# Patient Record
Sex: MALE | Race: WHITE | Employment: FULL TIME | URBAN - METROPOLITAN AREA
[De-identification: names, ages, dates, MRNs, and addresses within clinical notes are randomized per-mention and may not be internally consistent; named-entity substitution may affect disease eponyms.]

---

## 2017-02-08 RX ORDER — HYDROCHLOROTHIAZIDE 25 MG/1
TABLET ORAL
Qty: 30 TAB | Refills: 6 | Status: SHIPPED | OUTPATIENT
Start: 2017-02-08 | End: 2017-08-28 | Stop reason: SDUPTHER

## 2017-03-21 RX ORDER — RAMIPRIL 10 MG/1
CAPSULE ORAL
Qty: 30 CAP | Refills: 3 | Status: SHIPPED | OUTPATIENT
Start: 2017-03-21 | End: 2017-07-21 | Stop reason: SDUPTHER

## 2017-04-07 RX ORDER — METOPROLOL TARTRATE 25 MG/1
TABLET, FILM COATED ORAL
Qty: 60 TAB | Refills: 6 | Status: SHIPPED | OUTPATIENT
Start: 2017-04-07 | End: 2017-04-10 | Stop reason: SDUPTHER

## 2017-04-10 RX ORDER — METOPROLOL TARTRATE 25 MG/1
TABLET, FILM COATED ORAL
Qty: 60 TAB | Refills: 6 | Status: SHIPPED | OUTPATIENT
Start: 2017-04-10 | End: 2018-04-23 | Stop reason: SDUPTHER

## 2017-04-17 ENCOUNTER — OFFICE VISIT (OUTPATIENT)
Dept: CARDIOLOGY CLINIC | Age: 52
End: 2017-04-17

## 2017-04-17 VITALS
DIASTOLIC BLOOD PRESSURE: 88 MMHG | HEART RATE: 91 BPM | HEIGHT: 71 IN | OXYGEN SATURATION: 96 % | SYSTOLIC BLOOD PRESSURE: 138 MMHG | BODY MASS INDEX: 32.62 KG/M2 | WEIGHT: 233 LBS

## 2017-04-17 DIAGNOSIS — I25.10 CORONARY ARTERY DISEASE INVOLVING NATIVE HEART WITHOUT ANGINA PECTORIS, UNSPECIFIED VESSEL OR LESION TYPE: Primary | ICD-10-CM

## 2017-04-17 DIAGNOSIS — I25.5 ISCHEMIC CARDIOMYOPATHY: ICD-10-CM

## 2017-04-17 DIAGNOSIS — E78.00 HYPERCHOLESTEROLEMIA: ICD-10-CM

## 2017-04-17 DIAGNOSIS — Z95.1 S/P CABG X 3: ICD-10-CM

## 2017-04-17 DIAGNOSIS — Z72.0 TOBACCO ABUSE: ICD-10-CM

## 2017-04-17 DIAGNOSIS — I10 ESSENTIAL HYPERTENSION: ICD-10-CM

## 2017-04-17 NOTE — PROGRESS NOTES
1. Have you been to the ER, urgent care clinic since your last visit? Hospitalized since your last visit? No     2. Have you seen or consulted any other health care providers outside of the [address] [place] [address] since your last visit? Include any pap smears or colon screening.  No

## 2017-04-17 NOTE — MR AVS SNAPSHOT
Visit Information Date & Time Provider Department Dept. Phone Encounter #  
 4/17/2017  3:20 PM Chato Gonzalez MD [facility] [address] [id number] Upcoming Health Maintenance Date Due Hepatitis C Screening 1965 Pneumococcal 19-64 Medium Risk (1 of 1 - PPSV23) 6/21/1982 DTaP/Tdap/Td series (1 - Tdap) 6/21/1984 INFLUENZA AGE 9 TO ADULT 8/1/2016 FOBT Q 1 YEAR AGE 50-75 6/3/2017 Allergies as of 4/17/2017  Review Complete On: 10/7/2016 By: Kulwinder Guerra NP No Known Allergies Current Immunizations  Reviewed on 5/31/2016 No immunizations on file. Not reviewed this visit You Were Diagnosed With   
  
 Codes Comments Coronary artery disease involving native heart without angina pectoris, unspecified vessel or lesion type    -  Primary ICD-10-CM: I25.10 ICD-9-CM: 414.01 Essential hypertension     ICD-10-CM: I10 
ICD-9-CM: 401.9 Vitals BP Pulse Height(growth percentile) Weight(growth percentile) SpO2 BMI  
 138/88 91 5' 11\" (1.803 m) 233 lb (105.7 kg) 96% 32.5 kg/m2 Smoking Status Current Every Day Smoker Vitals History BMI and BSA Data Body Mass Index Body Surface Area 32.5 kg/m 2 2.3 m 2 Preferred Pharmacy Pharmacy Name Phone 800 [address] [phone] Your Updated Medication List  
  
   
This list is accurate as of: 4/17/17  4:16 PM.  Always use your most recent med list.  
  
  
  
  
 citalopram 20 mg tablet Commonly known as:  Lindsey Costa Take 0.5 Tabs by mouth daily. hydroCHLOROthiazide 25 mg tablet Commonly known as:  HYDRODIURIL  
take 1 tablet by mouth once daily  
  
 metoprolol tartrate 25 mg tablet Commonly known as:  LOPRESSOR  
take 1 tablet by mouth twice a day  
  
 ramipril 10 mg capsule Commonly known as:  ALTACE  
take 1 capsule by mouth once daily rivaroxaban 20 mg Tab tablet Commonly known as:  Justice Band Take 1 Tab by mouth daily (with dinner). topiramate 25 mg tablet Commonly known as:  TOPAMAX Take 50 mg by mouth nightly. traZODone 150 mg tablet Commonly known as:  Steve Ramp Take 150 mg by mouth nightly. ZOCOR PO Take 40 mg by mouth daily (after dinner). We Performed the Following AMB POC EKG ROUTINE W/ 12 LEADS, INTER & REP [51788 CPT(R)] Introducing [facility]! [facility] introduces [facility] patient portal. Now you can access parts of your medical record, email your doctor's office, and request medication refills online. 1. In your internet browser, go to [email or web address] 2. Click on the First Time User? Click Here link in the Sign In box. You will see the New Member Sign Up page. 3. Enter your [facility] Access Code exactly as it appears below. You will not need to use this code after youve completed the sign-up process. If you do not sign up before the expiration date, you must request a new code. · [facility] Access Code: [email or web address] Expires: 7/16/2017  4:16 PM 
 
4. Enter the last four digits of your Social Security Number (xxxx) and Date of Birth (mm/dd/yyyy) as indicated and click Submit. You will be taken to the next sign-up page. 5. Create a [facility] ID. This will be your [facility] login ID and cannot be changed, so think of one that is secure and easy to remember. 6. Create a [facility] password. You can change your password at any time. 7. Enter your Password Reset Question and Answer. This can be used at a later time if you forget your password. 8. Enter your e-mail address. You will receive e-mail notification when new information is available in [address] 9. Click Sign Up. You can now view and download portions of your medical record. 10. Click the Download Summary menu link to download a portable copy of your medical information. If you have questions, please visit the Frequently Asked Questions section of the [facility] website. Remember, [employer] is NOT to be used for urgent needs. For medical emergencies, dial 911. Now available from your iPhone and Android! Please provide this summary of care documentation to your next provider. Your primary care clinician is listed as LELO RANDOLPH. If you have any questions after today's visit, please call [phone].

## 2017-04-17 NOTE — PROGRESS NOTES
HISTORY OF PRESENT ILLNESS  Augie Parker is a 46 y.o. male. Hypertension   Associated symptoms include shortness of breath. Pertinent negatives include no chest pain, no abdominal pain and no headaches. Patient presents for a new office visit. He has a past medical history significant for known coronary artery disease with a prior myocardial infarction in the past to his lateral wall with stenting of his obtuse marginal branch. He also had a three-vessel bypass CABG in 2009. His long-standing hypertension, dyslipidemia, COPD with active tobacco use. Lastly, he is a history of a GIST tumor which was resected 5 years ago. The patient was hospitalized in June 2016 at [facility] for an acute bilateral pulmonary embolus. He is found to be quite hypoxic at that time. He was started on anticoagulation. He underwent an echocardiogram during his hospital stay which showed a mildly depressed LV systolic function, EF 19-99% with a mildly dilated right ventricle with mildly reduced systolic function, but no obvious pulmonary hypertension. The patient remained on Xarelto for anticoagulation since that time. Patient underwent a pharmacologic nuclear stress test in August 2016 which showed a mild to moderately depressed LV function, EF 40% with a mostly fixed but partially reversible distal posterior lateral perfusion defect likely representing an area of prior infarct with yomi-infarct ischemia. This was not significantly changed compared to his known coronary anatomy, so he has been managed medically. The patient was last seen in the office approximately 8-9 months ago, since that time, he denies any new chest pain or worsening shortness of breath. No orthopnea, PND, or leg swelling. No palpitations, dizziness or syncope. He admits that he is not very active. He is still smoking at least a pack of cigarettes every day. He remains on his oral anticoagulation.   He continues to have chronic shortness of breath which has not changed. Past Medical History:   Diagnosis Date    CAD (coronary artery disease)     Carpal tunnel syndrome     COPD     Depression     H/O echocardiogram June 2016    EF 35-40%, mild RVE, normal PA pressures    Hemorrhoids     History of esophagitis     History of malignant gastrointestinal stromal tumor (GIST)     Hypercholesterolemia     Hypertension     [facility]     Pulmonary embolus ([address] June 2016    Bilateral    S/P CABG x 3 2009    LIMA to LAD, SVG to RPDA, SVG to diagonal    S/P cardiac catheterization November 2011    Patent GALLAGHER to LAD, patent SVG to diet, and occluded SVG to RPDA,  native LAD 85% proximal stenosis, left circumflex 20% diffuse disease, RCA distal 70% stenosis,, EF 45%      Current Outpatient Prescriptions   Medication Sig Dispense Refill    metoprolol tartrate (LOPRESSOR) 25 mg tablet take 1 tablet by mouth twice a day 60 Tab 6    ramipril (ALTACE) 10 mg capsule take 1 capsule by mouth once daily 30 Cap 3    hydroCHLOROthiazide (HYDRODIURIL) 25 mg tablet take 1 tablet by mouth once daily 30 Tab 6    rivaroxaban (XARELTO) 20 mg tab tablet Take 1 Tab by mouth daily (with dinner). 30 Tab 4    citalopram (CELEXA) 20 mg tablet Take 0.5 Tabs by mouth daily. 15 Tab 2    topiramate (TOPAMAX) 25 mg tablet Take 50 mg by mouth nightly.  traZODone (DESYREL) 150 mg tablet Take 150 mg by mouth nightly.  SIMVASTATIN (ZOCOR PO) Take 40 mg by mouth daily (after dinner). No Known Allergies     Social History   Substance Use Topics    Smoking status: Current Every Day Smoker     Packs/day: 2.00    Smokeless tobacco: None    Alcohol use Yes      Comment: \"I haven't drank in 2 months, but I was drinking about 12 pk beer per week\"        History reviewed. No pertinent family history. Review of Systems   Constitutional: Negative for chills, fever and weight loss. HENT: Negative for nosebleeds.     Eyes: Negative for blurred vision and double vision. Respiratory: Positive for shortness of breath. Negative for cough and wheezing. Cardiovascular: Negative for chest pain, palpitations, orthopnea, claudication, leg swelling and PND. Gastrointestinal: Negative for abdominal pain, heartburn, nausea and vomiting. Genitourinary: Negative for dysuria and hematuria. Musculoskeletal: Negative for falls and myalgias. Skin: Negative for rash. Neurological: Negative for dizziness, focal weakness and headaches. Endo/Heme/Allergies: Does not bruise/bleed easily. Psychiatric/Behavioral: Negative for substance abuse. Visit Vitals    /88    Pulse 91    Ht 5' 11\" (1.803 m)    Wt 105.7 kg (233 lb)    SpO2 96%    BMI 32.5 kg/m2      Physical Exam   Constitutional: He is oriented to person, place, and time. He appears well-developed and well-nourished. HENT:   Head: Normocephalic and atraumatic. Eyes: Conjunctivae are normal.   Neck: Neck supple. No JVD present. Carotid bruit is not present. Cardiovascular: Normal rate, regular rhythm, S1 normal, S2 normal and normal pulses. Exam reveals no gallop and no S3. No murmur heard. Pulmonary/Chest: He has decreased breath sounds. He has no wheezes. He has no rales. Abdominal: Soft. Bowel sounds are normal. There is no tenderness. Musculoskeletal: He exhibits no edema or tenderness. Neurological: He is alert and oriented to person, place, and time. Skin: Skin is warm and dry. Psychiatric: He has a normal mood and affect. His behavior is normal.     EKG: Normal sinus rhythm,  Normal axis, inferior Q waves, consistent with prior infarct, normal QTc interval, no ST segment changes concerning for ischemia. No change compared to his previous EKGs. ASSESSMENT and PLAN    Coronary artery disease. Status post three-vessel CABG in 2009. LIMA to LAD, SVG to diagonal SVG to RPDA.   The SVG to PDA is known to be occluded on repeat cardiac catheterization in 2011. His native RCA has a 70% distal stenosis which has been managed medically. Patient underwent a pharmacologic nuclear stress test in August 2016, which showed an ejection fraction of 40% and a partial reversible distal posterior lateral defect consistent with his known coronary anatomy. No new symptoms concerning for angina. I have recommended that he restart his aspirin as soon as he drops his oral anticoagulation. Ischemic cardiomyopathy. EF 35-40% on his most recent echocardiogram in June 2016. This was confirmed on the stress testing several months later his ejection fraction was 40%. He remains on appropriate medical therapy including a beta-blocker, ACE inhibitor, and statin. He appears euvolemic. No need for additional medications at this time. Hypertension. Patient blood pressure is reasonably well-controlled on his current medical regimen, which I would continue. He did notice that when he did not take his HCTZ for several days his blood pressure became elevated and his swelling worsened. I have encouraged him to remain compliant with all his medications. Dyslipidemia. Managed with simvastatin 20 mg daily. His LDL should be less than 70 if possible. Tobacco use disorder. Patient is currently smoking a pack of cigarettes a day which is down from his chronic 2 packs of cigarettes a day. He was encouraged to try to put smoking completely. Recent bilateral pulmonary embolus. Patient remains on Xarelto 20 mg daily. This is being managed by his hematologist/oncologist.  He is now been on anticoagulation for approximately 9 months. Followup in 6 months, sooner if needed.

## 2017-07-23 RX ORDER — RAMIPRIL 10 MG/1
CAPSULE ORAL
Qty: 90 CAP | Refills: 3 | Status: SHIPPED | OUTPATIENT
Start: 2017-07-23 | End: 2018-07-19 | Stop reason: SDUPTHER

## 2017-08-29 RX ORDER — HYDROCHLOROTHIAZIDE 25 MG/1
TABLET ORAL
Qty: 30 TAB | Refills: 11 | Status: SHIPPED | OUTPATIENT
Start: 2017-08-29 | End: 2018-08-06 | Stop reason: SDUPTHER

## 2017-10-02 RX ORDER — RIVAROXABAN 20 MG/1
TABLET, FILM COATED ORAL
Qty: 30 TAB | Refills: 4 | Status: SHIPPED | OUTPATIENT
Start: 2017-10-02 | End: 2017-11-07 | Stop reason: SDUPTHER

## 2017-11-07 ENCOUNTER — OFFICE VISIT (OUTPATIENT)
Dept: CARDIOLOGY CLINIC | Age: 52
End: 2017-11-07

## 2017-11-07 VITALS
SYSTOLIC BLOOD PRESSURE: 110 MMHG | HEIGHT: 71 IN | BODY MASS INDEX: 34.02 KG/M2 | HEART RATE: 84 BPM | OXYGEN SATURATION: 96 % | DIASTOLIC BLOOD PRESSURE: 78 MMHG | WEIGHT: 243 LBS

## 2017-11-07 DIAGNOSIS — I10 ESSENTIAL HYPERTENSION WITH GOAL BLOOD PRESSURE LESS THAN 140/90: ICD-10-CM

## 2017-11-07 DIAGNOSIS — Z95.1 S/P CABG X 3: ICD-10-CM

## 2017-11-07 DIAGNOSIS — I25.10 CORONARY ARTERY DISEASE INVOLVING NATIVE CORONARY ARTERY OF NATIVE HEART WITHOUT ANGINA PECTORIS: Primary | ICD-10-CM

## 2017-11-07 DIAGNOSIS — Z72.0 TOBACCO ABUSE: ICD-10-CM

## 2017-11-07 DIAGNOSIS — R06.02 SOB (SHORTNESS OF BREATH): ICD-10-CM

## 2017-11-07 DIAGNOSIS — I25.5 ISCHEMIC CARDIOMYOPATHY: ICD-10-CM

## 2017-11-07 DIAGNOSIS — E78.5 DYSLIPIDEMIA: ICD-10-CM

## 2017-11-07 RX ORDER — ATORVASTATIN CALCIUM 40 MG/1
40 TABLET, FILM COATED ORAL DAILY
Qty: 30 TAB | Refills: 6 | Status: SHIPPED | OUTPATIENT
Start: 2017-11-07 | End: 2019-07-16

## 2017-11-07 NOTE — PATIENT INSTRUCTIONS
Stop Zocor (simvastatin)    Start Lipitor (atoravastatin) 40 mg take 1 tablet by mouth daily (Prescription sent to [facility])    Have fasting labs done to recheck cholesterol in 3 months (Feb. 2018) lab order will be mailed to you    If you do not receive your testing results within 2 weeks of the test date, please call our office at [phone] (Option 4). Thank you.   Sveta Duenas MD

## 2017-11-07 NOTE — PROGRESS NOTES
1. Have you been to the ER, urgent care clinic since your last visit? Hospitalized since your last visit? no  2. Have you seen or consulted any other health care providers outside of the [address] [place] [address] since your last visit? Include any pap smears or colon screening.   no

## 2017-11-07 NOTE — PROGRESS NOTES
HISTORY OF PRESENT ILLNESS  Brandie Yates is a 45 y.o. male. Hypertension   Associated symptoms include shortness of breath. Pertinent negatives include no chest pain, no abdominal pain and no headaches. Patient presents for a follow-up office visit. He has a past medical history significant for known coronary artery disease with a prior myocardial infarction in the past to his lateral wall with stenting of his obtuse marginal branch. He also had a three-vessel bypass CABG in 2009. His long-standing hypertension, dyslipidemia, COPD with active tobacco use. Lastly, he is a history of a GIST tumor which was resected 5 years ago. The patient was hospitalized in June 2016 at [facility] for an acute bilateral pulmonary embolus. He is found to be quite hypoxic at that time. He was started on anticoagulation. He underwent an echocardiogram during his hospital stay which showed a mildly depressed LV systolic function, EF 31-92% with a mildly dilated right ventricle with mildly reduced systolic function, but no obvious pulmonary hypertension. The patient remained on Xarelto for anticoagulation since that time. Patient underwent a pharmacologic nuclear stress test in August 2016 which showed a mild to moderately depressed LV function, EF 40% with a mostly fixed but partially reversible distal posterior lateral perfusion defect likely representing an area of prior infarct with yomi-infarct ischemia. This was not significantly changed compared to his known coronary anatomy, so he has been managed medically. The patient was last seen in the office approximately 6-7 months ago, since that time, he denies any new chest pain. However, he does feel that his activity tolerance is diminished over the past 6 months. He does complain o dyspnea on exertion which is more of an acute on chronic issue.   He was diagnosed with an episode of acute bronchitis several weeks ago and required a dose of steroids. He states his shortness of breath has improved but has not yet returned to baseline. He denies any leg swelling, orthopnea, PND or claudication. Past Medical History:   Diagnosis Date    CAD (coronary artery disease)     Carpal tunnel syndrome     COPD     Depression     H/O echocardiogram June 2016    EF 35-40%, mild RVE, normal PA pressures    Hemorrhoids     History of esophagitis     History of malignant gastrointestinal stromal tumor (GIST)     Hypercholesterolemia     Hypertension     Myocardial infarction     Pulmonary embolus ([address] June 2016    Bilateral    S/P CABG x 3 2009    LIMA to LAD, SVG to RPDA, SVG to diagonal    S/P cardiac catheterization November 2011    Patent GALLAGHER to LAD, patent SVG to diet, and occluded SVG to RPDA,  native LAD 85% proximal stenosis, left circumflex 20% diffuse disease, RCA distal 70% stenosis,, EF 45%      Current Outpatient Prescriptions   Medication Sig Dispense Refill    atorvastatin (LIPITOR) 40 mg tablet Take 1 Tab by mouth daily. 30 Tab 6    hydroCHLOROthiazide (HYDRODIURIL) 25 mg tablet take 1 tablet by mouth once daily 30 Tab 11    ramipril (ALTACE) 10 mg capsule take 1 capsule by mouth once daily 90 Cap 3    metoprolol tartrate (LOPRESSOR) 25 mg tablet take 1 tablet by mouth twice a day 60 Tab 6    rivaroxaban (XARELTO) 20 mg tab tablet Take 1 Tab by mouth daily (with dinner). 30 Tab 4    citalopram (CELEXA) 20 mg tablet Take 0.5 Tabs by mouth daily. 15 Tab 2    topiramate (TOPAMAX) 25 mg tablet Take 50 mg by mouth nightly.  traZODone (DESYREL) 150 mg tablet Take 150 mg by mouth nightly. No Known Allergies     Social History   Substance Use Topics    Smoking status: Current Every Day Smoker     Packs/day: 2.00    Smokeless tobacco: Never Used    Alcohol use Yes      Comment: \"I haven't drank in 2 months, but I was drinking about 12 pk beer per week\"        History reviewed. No pertinent family history.      Review of Systems   Constitutional: Negative for chills, fever and weight loss. HENT: Negative for nosebleeds. Eyes: Negative for blurred vision and double vision. Respiratory: Positive for shortness of breath. Negative for cough and wheezing. Cardiovascular: Negative for chest pain, palpitations, orthopnea, claudication, leg swelling and PND. Gastrointestinal: Negative for abdominal pain, heartburn, nausea and vomiting. Genitourinary: Negative for dysuria and hematuria. Musculoskeletal: Negative for falls and myalgias. Skin: Negative for rash. Neurological: Negative for dizziness, focal weakness and headaches. Endo/Heme/Allergies: Does not bruise/bleed easily. Psychiatric/Behavioral: Negative for substance abuse. Visit Vitals    /78 (BP 1 Location: Right arm, BP Patient Position: Sitting)    Pulse 84    Ht 5' 11\" (1.803 m)    Wt 110.2 kg (243 lb)    SpO2 96%    BMI 33.89 kg/m2      Physical Exam   Constitutional: He is oriented to person, place, and time. He appears well-developed and well-nourished. HENT:   Head: Normocephalic and atraumatic. Eyes: Conjunctivae are normal.   Neck: Neck supple. No JVD present. Carotid bruit is not present. Cardiovascular: Normal rate, regular rhythm, S1 normal, S2 normal and normal pulses. Exam reveals no gallop and no S3. No murmur heard. Pulmonary/Chest: He has decreased breath sounds. He has no wheezes. He has no rales. Abdominal: Soft. Bowel sounds are normal. There is no tenderness. Musculoskeletal: He exhibits no edema or tenderness. Neurological: He is alert and oriented to person, place, and time. Skin: Skin is warm and dry. Psychiatric: He has a normal mood and affect. His behavior is normal.     EKG: Normal sinus rhythm,  Normal axis, inferior Q waves, consistent with prior infarct, normal QTc interval, no ST segment changes concerning for ischemia. No change compared to his previous EKGs.     ASSESSMENT and PLAN    Coronary artery disease. Status post three-vessel CABG in 2009. LIMA to LAD, SVG to diagonal SVG to RPDA. The SVG to PDA is known to be occluded on repeat cardiac catheterization in 2011. His native RCA has a 70% distal stenosis which has been managed medically. Patient underwent a pharmacologic nuclear stress test in August 2016, which showed an ejection fraction of 40% and a partial reversible distal posterior lateral defect consistent with his known coronary anatomy. No new symptoms concerning for angina. He is no longer taking an aspirin because of his oral anticoagulation. However I would recommend that he restart this once his anticoagulation is stopped. Ischemic cardiomyopathy. EF 35-40% on his most recent echocardiogram in June 2016. This was confirmed on the stress testing several months later his ejection fraction was 40%. He remains on appropriate medical therapy including a beta-blocker, ACE inhibitor, and statin. He appears euvolemic. Since he does complain of decreased activity tolerance and some shortness of breath, I have recommended repeating an echocardiogram to reevaluate his overall LV function. Hypertension. Patient blood pressure is reasonably well-controlled on his current medical regimen. All of which I would continue. Dyslipidemia. Patient is no longer taking any lipid-lowering therapy for unknown reasons. I recommended that he start atorvastatin 40 mg daily. I would like to check a fasting lipid panel and LFTs in several months. Tobacco use disorder. Patient is currently smoking a pack of cigarettes a day which is down from his chronic 2 packs of cigarettes a day. He was encouraged to try to put smoking completely. History of bilateral pulmonary embolus. This occurred in  June 2016. Patient remains on Xarelto 20 mg daily. This is being managed by his hematologist/oncologist.      Followup in 6 months, sooner if needed.

## 2017-11-07 NOTE — MR AVS SNAPSHOT
Visit Information Date & Time Provider Department Dept. Phone Encounter #  
 11/7/2017  2:00 PM Checo Gonzales MD [facility] [id number] Follow-up Instructions Return in about 6 months (around 5/7/2018). Follow-up and Disposition History Upcoming Health Maintenance Date Due Hepatitis C Screening 1965 Pneumococcal 19-64 Medium Risk (1 of 1 - PPSV23) 6/21/1982 DTaP/Tdap/Td series (1 - Tdap) 6/21/1984 FOBT Q 1 YEAR AGE 50-75 6/3/2017 Influenza Age 5 to Adult 8/1/2017 Allergies as of 11/7/2017  Review Complete On: 11/7/2017 By: Checo Gonzales MD  
 No Known Allergies Current Immunizations  Reviewed on 5/31/2016 No immunizations on file. Not reviewed this visit You Were Diagnosed With   
  
 Codes Comments Coronary artery disease involving native coronary artery of native heart without angina pectoris    -  Primary ICD-10-CM: I25.10 ICD-9-CM: 414.01   
 SOB (shortness of breath)     ICD-10-CM: R06.02 
ICD-9-CM: 786.05 Dyslipidemia     ICD-10-CM: E78.5 ICD-9-CM: 272.4 S/P CABG x 3     ICD-10-CM: Z95.1 ICD-9-CM: V45.81 Tobacco abuse     ICD-10-CM: Z72.0 ICD-9-CM: 305.1 Essential hypertension with goal blood pressure less than 140/90     ICD-10-CM: I10 
ICD-9-CM: 401.9 Ischemic cardiomyopathy     ICD-10-CM: I25.5 ICD-9-CM: 414.8 Vitals BP Pulse Height(growth percentile) Weight(growth percentile) SpO2 BMI  
 110/78 (BP 1 Location: Right arm, BP Patient Position: Sitting) 84 5' 11\" (1.803 m) 243 lb (110.2 kg) 96% 33.89 kg/m2 Smoking Status Current Every Day Smoker Vitals History BMI and BSA Data Body Mass Index Body Surface Area  
 33.89 kg/m 2 2.35 m 2 Preferred Pharmacy Pharmacy Name Phone [address], [address] [place] [address] [phone] Your Updated Medication List  
  
   
 This list is accurate as of: 11/7/17  2:40 PM.  Always use your most recent med list.  
  
  
  
  
 atorvastatin 40 mg tablet Commonly known as:  LIPITOR Take 1 Tab by mouth daily. citalopram 20 mg tablet Commonly known as:  Linn Lindau Take 0.5 Tabs by mouth daily. hydroCHLOROthiazide 25 mg tablet Commonly known as:  HYDRODIURIL  
take 1 tablet by mouth once daily  
  
 metoprolol tartrate 25 mg tablet Commonly known as:  LOPRESSOR  
take 1 tablet by mouth twice a day  
  
 ramipril 10 mg capsule Commonly known as:  ALTACE  
take 1 capsule by mouth once daily  
  
 rivaroxaban 20 mg Tab tablet Commonly known as:  Rasheeda Jah Take 1 Tab by mouth daily (with dinner). topiramate 25 mg tablet Commonly known as:  TOPAMAX Take 50 mg by mouth nightly. traZODone 150 mg tablet Commonly known as:  Rogers Bump Take 150 mg by mouth nightly. Prescriptions Sent to Pharmacy Refills  
 atorvastatin (LIPITOR) 40 mg tablet 6 Sig: Take 1 Tab by mouth daily. Class: Normal  
 Pharmacy: [facility], [address] [place] [address] #: [phone] Route: Oral  
  
We Performed the Following AMB POC EKG ROUTINE W/ 12 LEADS, INTER & REP [15145 CPT(R)] Follow-up Instructions Return in about 6 months (around 5/7/2018). To-Do List   
 11/07/2017 ECHO:  2D ECHO COMPLETE ADULT (TTE) W OR WO CONTR   
  
 11/28/2017 11:00 AM  
  Appointment with HBV- [id number] MACHINE (WT ) at [facility] NON-INVASIVE CARD ([phone]) Age Limit for ALL Heart procedures @ all [facility] facilities: 18 yrs and older only. Under the age of 25, refer to [facility] ([phone]). Wt Limit: 350lbs. This study requires patient to bring a written physician's order or MD office may fax the order to Central Scheduling at [phone]. Patient needs to bring a current list of all medications.   No preparation is required for this study. Patients should report 15 minutes prior to their appointment time to the [facility], [address].     
  
 02/07/2018 Lab:  HEPATIC FUNCTION PANEL   
  
 02/07/2018 Lab:  LIPID PANEL Patient Instructions Stop Zocor (simvastatin) Start Lipitor (atoravastatin) 40 mg take 1 tablet by mouth daily (Prescription sent to [facility]) Have fasting labs done to recheck cholesterol in 3 months (Feb. 2018) lab order will be mailed to you If you do not receive your testing results within 2 weeks of the test date, please call our office at [phone] (Option 4). Thank you. Rosa Hyde MD 
  
 
 
 
 Patient Instructions History Introducing hospitals & HEALTH SERVICES! Taran Alcantara introduces [employer] patient portal. Now you can access parts of your medical record, email your doctor's office, and request medication refills online. 1. In your internet browser, go to [email or web address] 2. Click on the First Time User? Click Here link in the Sign In box. You will see the New Member Sign Up page. 3. Enter your [employer] Access Code exactly as it appears below. You will not need to use this code after youve completed the sign-up process. If you do not sign up before the expiration date, you must request a new code. · [employer] Access Code: [email or web address] Expires: 2/5/2018  1:50 PM 
 
4. Enter the last four digits of your Social Security Number (xxxx) and Date of Birth (mm/dd/yyyy) as indicated and click Submit. You will be taken to the next sign-up page. 5. Create a [employer] ID. This will be your [employer] login ID and cannot be changed, so think of one that is secure and easy to remember. 6. Create a [employer] password. You can change your password at any time. 7. Enter your Password Reset Question and Answer. This can be used at a later time if you forget your password. 8. Enter your e-mail address. You will receive e-mail notification when new information is available in [address] 9. Click Sign Up. You can now view and download portions of your medical record. 10. Click the Download Summary menu link to download a portable copy of your medical information. If you have questions, please visit the Frequently Asked Questions section of the "[employer]" website. Remember, "[employer]" is NOT to be used for urgent needs. For medical emergencies, dial 911. Now available from your iPhone and Android! Please provide this summary of care documentation to your next provider. Your primary care clinician is listed as Martín Garvin. If you have any questions after today's visit, please call [phone].

## 2017-11-28 ENCOUNTER — HOSPITAL ENCOUNTER (OUTPATIENT)
Dept: NON INVASIVE DIAGNOSTICS | Age: 52
Discharge: HOME | End: 2017-11-28
Attending: INTERNAL MEDICINE
Payer: COMMERCIAL

## 2017-11-28 DIAGNOSIS — R06.02 SOB (SHORTNESS OF BREATH): ICD-10-CM

## 2017-11-28 PROCEDURE — 74011250636 HC RX REV CODE- 250/636: Performed by: INTERNAL MEDICINE

## 2017-11-28 PROCEDURE — C8929 TTE W OR WO FOL WCON,DOPPLER: HCPCS

## 2017-11-28 RX ADMIN — PERFLUTREN 2 ML: 6.52 INJECTION, SUSPENSION INTRAVENOUS at 11:34

## 2017-11-28 NOTE — PROGRESS NOTES
Complete 2D echocardiogram study was performed on patient. Report to follow. Definity was used to acquire echocardiogram pictures. Report to follow. Patient armband was removed before discharging.

## 2017-11-30 ENCOUNTER — TELEPHONE (OUTPATIENT)
Dept: CARDIOLOGY CLINIC | Age: 52
End: 2017-11-30

## 2017-11-30 NOTE — PROGRESS NOTES
Ischemic cardiomyopathy. EF 35-40% on his most recent echocardiogram in June 2016. This was confirmed on the stress testing several months later his ejection fraction was 40%. He remains on appropriate medical therapy including a beta-blocker, ACE inhibitor, and statin. He appears euvolemic. Since he does complain of decreased activity tolerance and some shortness of breath, I have recommended repeating an echocardiogram to reevaluate his overall LV function.

## 2017-11-30 NOTE — TELEPHONE ENCOUNTER
----- Message from Enmanuel Sylvester MD sent at 11/30/2017  3:35 PM EST -----  Please let the patient know that his heart function remains moderately depressed and was essentially unchanged compared to last year.  ----- Message -----     From: Laurina Goldmann     Sent: 11/30/2017   3:29 PM       To: Enmanuel Sylvester MD    Ischemic cardiomyopathy. EF 35-40% on his most recent echocardiogram in June 2016. This was confirmed on the stress testing several months later his ejection fraction was 40%. He remains on appropriate medical therapy including a beta-blocker, ACE inhibitor, and statin. He appears euvolemic. Since he does complain of decreased activity tolerance and some shortness of breath, I have recommended repeating an echocardiogram to reevaluate his overall LV function.

## 2017-11-30 NOTE — LETTER
12/1/2017 8:02 AM 
 
Mr. Young Reece [address] [id number] Dear Quentin Denise Briseno, We have been unable to reach you by phone to notify you of your test results. Please call our office at [phone] and ask to speak with my nurse in order to explain these results to you and advise you of any recommendations. Sincerely, Antonio Luis MD

## 2017-12-30 ENCOUNTER — HOSPITAL ENCOUNTER (EMERGENCY)
Age: 52
Discharge: HOME | End: 2017-12-30
Attending: EMERGENCY MEDICINE
Payer: COMMERCIAL

## 2017-12-30 ENCOUNTER — APPOINTMENT (OUTPATIENT)
Dept: CT IMAGING | Age: 52
End: 2017-12-30
Attending: PHYSICIAN ASSISTANT
Payer: COMMERCIAL

## 2017-12-30 VITALS
BODY MASS INDEX: 32.9 KG/M2 | HEIGHT: 71 IN | TEMPERATURE: 98 F | RESPIRATION RATE: 18 BRPM | SYSTOLIC BLOOD PRESSURE: 110 MMHG | HEART RATE: 73 BPM | WEIGHT: 235 LBS | OXYGEN SATURATION: 99 % | DIASTOLIC BLOOD PRESSURE: 80 MMHG

## 2017-12-30 DIAGNOSIS — G44.029 CHRONIC CLUSTER HEADACHE, NOT INTRACTABLE: ICD-10-CM

## 2017-12-30 DIAGNOSIS — E87.6 HYPOKALEMIA: ICD-10-CM

## 2017-12-30 DIAGNOSIS — R19.7 DIARRHEA, UNSPECIFIED TYPE: ICD-10-CM

## 2017-12-30 DIAGNOSIS — E86.0 DEHYDRATION: ICD-10-CM

## 2017-12-30 DIAGNOSIS — R42 DIZZINESS: Primary | ICD-10-CM

## 2017-12-30 LAB
ALBUMIN SERPL-MCNC: 3.4 G/DL (ref 3.4–5)
ALBUMIN/GLOB SERPL: 0.8 {RATIO} (ref 0.8–1.7)
ALP SERPL-CCNC: 45 U/L (ref 45–117)
ALT SERPL-CCNC: 22 U/L (ref 16–61)
ANION GAP SERPL CALC-SCNC: 8 MMOL/L (ref 3–18)
AST SERPL-CCNC: 16 U/L (ref 15–37)
BASOPHILS # BLD: 0.1 K/UL (ref 0–0.06)
BASOPHILS NFR BLD: 2 % (ref 0–2)
BILIRUB DIRECT SERPL-MCNC: <0.1 MG/DL (ref 0–0.2)
BILIRUB SERPL-MCNC: 0.4 MG/DL (ref 0.2–1)
BUN SERPL-MCNC: 25 MG/DL (ref 7–18)
BUN/CREAT SERPL: 18 (ref 12–20)
CALCIUM SERPL-MCNC: 10.3 MG/DL (ref 8.5–10.1)
CHLORIDE SERPL-SCNC: 93 MMOL/L (ref 100–108)
CK MB CFR SERPL CALC: 1.5 % (ref 0–4)
CK MB SERPL-MCNC: 1.6 NG/ML (ref 5–25)
CK SERPL-CCNC: 106 U/L (ref 39–308)
CO2 SERPL-SCNC: 31 MMOL/L (ref 21–32)
CREAT SERPL-MCNC: 1.38 MG/DL (ref 0.6–1.3)
DIFFERENTIAL METHOD BLD: ABNORMAL
EOSINOPHIL # BLD: 0.5 K/UL (ref 0–0.4)
EOSINOPHIL NFR BLD: 6 % (ref 0–5)
ERYTHROCYTE [DISTWIDTH] IN BLOOD BY AUTOMATED COUNT: 15.6 % (ref 11.6–14.5)
GLOBULIN SER CALC-MCNC: 4.1 G/DL (ref 2–4)
GLUCOSE SERPL-MCNC: 91 MG/DL (ref 74–99)
HCT VFR BLD AUTO: 37.9 % (ref 36–48)
HGB BLD-MCNC: 12.5 G/DL (ref 13–16)
LIPASE SERPL-CCNC: 78 U/L (ref 73–393)
LYMPHOCYTES # BLD: 2 K/UL (ref 0.9–3.6)
LYMPHOCYTES NFR BLD: 27 % (ref 21–52)
MCH RBC QN AUTO: 27.5 PG (ref 24–34)
MCHC RBC AUTO-ENTMCNC: 33 G/DL (ref 31–37)
MCV RBC AUTO: 83.5 FL (ref 74–97)
MONOCYTES # BLD: 0.6 K/UL (ref 0.05–1.2)
MONOCYTES NFR BLD: 8 % (ref 3–10)
NEUTS SEG # BLD: 4.3 K/UL (ref 1.8–8)
NEUTS SEG NFR BLD: 57 % (ref 40–73)
PLATELET # BLD AUTO: 223 K/UL (ref 135–420)
PMV BLD AUTO: 10.4 FL (ref 9.2–11.8)
POTASSIUM SERPL-SCNC: 3 MMOL/L (ref 3.5–5.5)
PROT SERPL-MCNC: 7.5 G/DL (ref 6.4–8.2)
RBC # BLD AUTO: 4.54 M/UL (ref 4.7–5.5)
SODIUM SERPL-SCNC: 132 MMOL/L (ref 136–145)
TROPONIN I SERPL-MCNC: 0.02 NG/ML (ref 0–0.04)
WBC # BLD AUTO: 7.4 K/UL (ref 4.6–13.2)

## 2017-12-30 PROCEDURE — 74011250636 HC RX REV CODE- 250/636: Performed by: EMERGENCY MEDICINE

## 2017-12-30 PROCEDURE — 93005 ELECTROCARDIOGRAM TRACING: CPT

## 2017-12-30 PROCEDURE — 99283 EMERGENCY DEPT VISIT LOW MDM: CPT

## 2017-12-30 PROCEDURE — 83690 ASSAY OF LIPASE: CPT | Performed by: EMERGENCY MEDICINE

## 2017-12-30 PROCEDURE — 74011250636 HC RX REV CODE- 250/636: Performed by: PHYSICIAN ASSISTANT

## 2017-12-30 PROCEDURE — 82550 ASSAY OF CK (CPK): CPT | Performed by: EMERGENCY MEDICINE

## 2017-12-30 PROCEDURE — 80076 HEPATIC FUNCTION PANEL: CPT | Performed by: EMERGENCY MEDICINE

## 2017-12-30 PROCEDURE — 80048 BASIC METABOLIC PNL TOTAL CA: CPT | Performed by: EMERGENCY MEDICINE

## 2017-12-30 PROCEDURE — 96374 THER/PROPH/DIAG INJ IV PUSH: CPT

## 2017-12-30 PROCEDURE — 96361 HYDRATE IV INFUSION ADD-ON: CPT

## 2017-12-30 PROCEDURE — 85025 COMPLETE CBC W/AUTO DIFF WBC: CPT | Performed by: PHYSICIAN ASSISTANT

## 2017-12-30 PROCEDURE — 70450 CT HEAD/BRAIN W/O DYE: CPT

## 2017-12-30 PROCEDURE — 96375 TX/PRO/DX INJ NEW DRUG ADDON: CPT

## 2017-12-30 PROCEDURE — 74011250637 HC RX REV CODE- 250/637: Performed by: PHYSICIAN ASSISTANT

## 2017-12-30 RX ORDER — POTASSIUM CHLORIDE 20 MEQ/1
20 TABLET, EXTENDED RELEASE ORAL 3 TIMES DAILY
Qty: 9 TAB | Refills: 0 | Status: SHIPPED | OUTPATIENT
Start: 2017-12-30 | End: 2018-01-02

## 2017-12-30 RX ORDER — ONDANSETRON 4 MG/1
TABLET, ORALLY DISINTEGRATING ORAL
Qty: 10 TAB | Refills: 0 | Status: SHIPPED | OUTPATIENT
Start: 2017-12-30 | End: 2018-05-09 | Stop reason: SDUPTHER

## 2017-12-30 RX ORDER — DIPHENHYDRAMINE HYDROCHLORIDE 50 MG/ML
50 INJECTION, SOLUTION INTRAMUSCULAR; INTRAVENOUS ONCE
Status: COMPLETED | OUTPATIENT
Start: 2017-12-30 | End: 2017-12-30

## 2017-12-30 RX ORDER — PREDNISONE 10 MG/1
TABLET ORAL
Qty: 21 TAB | Refills: 0 | Status: SHIPPED | OUTPATIENT
Start: 2017-12-30 | End: 2019-06-20

## 2017-12-30 RX ORDER — POTASSIUM CHLORIDE 20 MEQ/1
40 TABLET, EXTENDED RELEASE ORAL
Status: COMPLETED | OUTPATIENT
Start: 2017-12-30 | End: 2017-12-30

## 2017-12-30 RX ORDER — METOCLOPRAMIDE HYDROCHLORIDE 5 MG/ML
10 INJECTION INTRAMUSCULAR; INTRAVENOUS
Status: COMPLETED | OUTPATIENT
Start: 2017-12-30 | End: 2017-12-30

## 2017-12-30 RX ADMIN — DIPHENHYDRAMINE HYDROCHLORIDE 50 MG: 50 INJECTION INTRAMUSCULAR; INTRAVENOUS at 17:22

## 2017-12-30 RX ADMIN — SODIUM CHLORIDE 250 ML: 900 INJECTION, SOLUTION INTRAVENOUS at 19:01

## 2017-12-30 RX ADMIN — POTASSIUM CHLORIDE 40 MEQ: 20 TABLET, EXTENDED RELEASE ORAL at 19:01

## 2017-12-30 RX ADMIN — METOCLOPRAMIDE 10 MG: 5 INJECTION, SOLUTION INTRAMUSCULAR; INTRAVENOUS at 17:52

## 2017-12-30 RX ADMIN — SODIUM CHLORIDE 250 ML: 900 INJECTION, SOLUTION INTRAVENOUS at 17:54

## 2017-12-30 NOTE — ED NOTES
I performed a brief evaluation, including history and physical, of the patient here in triage and I have determined that pt will need further treatment and evaluation from the main side ER physician. I have placed initial orders to help in expediting patients care.      December 30, 2017 at 5:21 PM - RADHA Ferguson        Visit Vitals    /80 (BP 1 Location: Left arm, BP Patient Position: At rest)    Pulse 73    Temp 98 °F (36.7 °C)    Resp 18    Ht 5' 11\" (1.803 m)    Wt 106.6 kg (235 lb)    SpO2 99%    BMI 32.78 kg/m2

## 2017-12-30 NOTE — ED TRIAGE NOTES
Triage: pt seen at [facility] last week and was prescribed Topamax. Pt states that ever since he started taking the medication he has had dizziness, nausea, diarrhea. Pt went to follow-up appt today and was noted to have BP of 90/62. Triage /80. Pt sent to ED for further tx. Pt still complains of right sided headache and neck tension.

## 2017-12-30 NOTE — ED PROVIDER NOTES
EMERGENCY DEPARTMENT HISTORY AND PHYSICAL EXAM    6:27 PM      Date: 12/30/2017  Patient Name: Virginie Bautista    History of Presenting Illness     Chief Complaint   Patient presents with    Headache       History Provided By: Patient    Chief Complaint: headache, dizziness, nausea, diarrhea  Duration: 10 Days  Timing:  Progressive  Location: multiple body systems  Quality: Aching  Severity: Moderate  Modifying Factors: started after Topamax for chronic cluster headaches  Associated Symptoms: denies any other associated signs or symptoms      Additional History (Context): Virginie Bautista is a 45 y.o. male smoker with a pertinent history of COPD, CHF, CAD with MI s/p CABG x 3 and stent x 1, HTN, HLD, and PE (AC with xarelto) who presents to the emergency department for evaluation of intermittent dizziness, nausea without vomiting, and diarrhea x 10 days since starting Topamax was prescribed by urgent care for chronic cluster headaches. He reports the topamax works for his headaches, but is making him sick. He is having approximately 3 episodes of loose stools daily. He relates decreased oral intake 2/2 to nausea. Cluster headaches are accompanied by rhinorrhea and cough - this is normal for him. No change in vision/photophobia. No recent injury/trauma. He had a follow-up appointment at Urgent care today and was noted to have hypotensive BP at 90/66mmHg, so they sent him here for evaluation. Pt denies increased SOB from baseline. No CP. With prior MI, had \"cramping in left armpit,\" but denies anything similar today. Pt was also restarted on statin (Lipitor) over a month ago, but denies any issues with this medication. He reports compliance with all prescribed medications and does not miss any doses. Pt denies any recent fevers or chills, abd pain, leg swelling, back pain, melena/hematochezia, dysuria, hematuria, frequency, focal weakness/numbness/tingling, or rash.   Patient has no other complaints at this time. PCP:  RADHA Carmona      Current Outpatient Prescriptions   Medication Sig Dispense Refill    potassium chloride (K-DUR, KLOR-CON) 20 mEq tablet Take 1 Tab by mouth three (3) times daily for 3 days. 9 Tab 0    ondansetron (ZOFRAN ODT) 4 mg disintegrating tablet Take 1-2 tablets every 6-8 hours as needed for nausea and vomiting. 10 Tab 0    atorvastatin (LIPITOR) 40 mg tablet Take 1 Tab by mouth daily. 30 Tab 6    hydroCHLOROthiazide (HYDRODIURIL) 25 mg tablet take 1 tablet by mouth once daily 30 Tab 11    ramipril (ALTACE) 10 mg capsule take 1 capsule by mouth once daily 90 Cap 3    metoprolol tartrate (LOPRESSOR) 25 mg tablet take 1 tablet by mouth twice a day 60 Tab 6    rivaroxaban (XARELTO) 20 mg tab tablet Take 1 Tab by mouth daily (with dinner). 30 Tab 4    citalopram (CELEXA) 20 mg tablet Take 0.5 Tabs by mouth daily. 15 Tab 2    topiramate (TOPAMAX) 25 mg tablet Take 50 mg by mouth nightly.  traZODone (DESYREL) 150 mg tablet Take 150 mg by mouth nightly.          Past History     Past Medical History:  Past Medical History:   Diagnosis Date    CAD (coronary artery disease)     Carpal tunnel syndrome     COPD     Depression     H/O echocardiogram June 2016    EF 35-40%, mild RVE, normal PA pressures    Hemorrhoids     History of esophagitis     History of malignant gastrointestinal stromal tumor (GIST)     Hypercholesterolemia     Hypertension     Myocardial infarction     Pulmonary embolus ([address] June 2016    Bilateral    S/P CABG x 3 2009    LIMA to LAD, SVG to RPDA, SVG to diagonal    S/P cardiac catheterization November 2011    Patent GALLAGHER to LAD, patent SVG to diet, and occluded SVG to RPDA,  native LAD 85% proximal stenosis, left circumflex 20% diffuse disease, RCA distal 70% stenosis,, EF 45%       Past Surgical History:  Past Surgical History:   Procedure Laterality Date    HX CORONARY STENT PLACEMENT      HX CYST REMOVAL      HX HEMORRHOIDECTOMY      HX ORTHOPAEDIC      knee    HX OTHER SURGICAL      resection of mesenteric mass       Family History:  History reviewed. No pertinent family history. Social History:  Social History   Substance Use Topics    Smoking status: Current Every Day Smoker     Packs/day: 1.00    Smokeless tobacco: Never Used    Alcohol use Yes      Comment: \"I haven't drank in 2 months, but I was drinking about 12 pk beer per week\"        Allergies:  No Known Allergies      Review of Systems     Review of Systems   Constitutional: Positive for appetite change. Negative for chills and fever. HENT: Positive for congestion and rhinorrhea. Negative for sore throat. Respiratory: Positive for cough. Negative for shortness of breath. Cardiovascular: Negative for chest pain and leg swelling. Gastrointestinal: Positive for diarrhea and nausea. Negative for abdominal pain, blood in stool, constipation and vomiting. Genitourinary: Negative for dysuria, frequency and hematuria. Musculoskeletal: Negative for back pain and myalgias. Skin: Negative for rash and wound. Neurological: Positive for dizziness and headaches. Physical Exam     Visit Vitals    /80 (BP 1 Location: Left arm, BP Patient Position: At rest)    Pulse 73    Temp 98 °F (36.7 °C)    Resp 18    Ht 5' 11\" (1.803 m)    Wt 106.6 kg (235 lb)    SpO2 99%    BMI 32.78 kg/m2       Physical Exam   Constitutional: He is oriented to person, place, and time. He appears well-developed and well-nourished. No distress. Smells strongly of cigarettes   HENT:   Head: Normocephalic and atraumatic. Nose: Mucosal edema and rhinorrhea present. Eyes: Conjunctivae and EOM are normal. Pupils are equal, round, and reactive to light. Right eye exhibits no discharge. Left eye exhibits no discharge. Neck: Normal range of motion. Neck supple. No thyromegaly present. Cardiovascular: Normal rate and regular rhythm.     Pulmonary/Chest: Effort normal and breath sounds normal. No respiratory distress. He has no wheezes. He has no rales. He exhibits no tenderness. Lungs CTAB   Abdominal: Soft. Bowel sounds are normal. He exhibits no distension. There is no tenderness. There is no rebound and no guarding. Musculoskeletal: Normal range of motion. He exhibits no edema, tenderness or deformity. Lymphadenopathy:     He has no cervical adenopathy. Neurological: He is alert and oriented to person, place, and time. No cranial nerve deficit. Coordination normal.   Pt is awake, alert, oriented x 3.  CNs 2-12 intact and normal.  No facial droop. Normal speech. Pt is answering questions and following commands appropriately. Pt ambulatory with even, steady gait, moving BUE and BLE with equal strength and intact distal neurovascular status. Skin: Skin is warm and dry. No rash noted. He is not diaphoretic. Psychiatric: He has a normal mood and affect. Nursing note and vitals reviewed.         Diagnostic Study Results     Labs -  Recent Results (from the past 12 hour(s))   METABOLIC PANEL, BASIC    Collection Time: 12/30/17  5:42 PM   Result Value Ref Range    Sodium 132 (L) 136 - 145 mmol/L    Potassium 3.0 (L) 3.5 - 5.5 mmol/L    Chloride 93 (L) 100 - 108 mmol/L    CO2 31 21 - 32 mmol/L    Anion gap 8 3.0 - 18 mmol/L    Glucose 91 74 - 99 mg/dL    BUN 25 (H) 7.0 - 18 MG/DL    Creatinine 1.38 (H) 0.6 - 1.3 MG/DL    BUN/Creatinine ratio 18 12 - 20      GFR est AA >60 >60 ml/min/1.73m2    GFR est non-AA 54 (L) >60 ml/min/1.73m2    Calcium 10.3 (H) 8.5 - 10.1 MG/DL   CARDIAC PANEL,(CK, CKMB & TROPONIN)    Collection Time: 12/30/17  5:42 PM   Result Value Ref Range     39 - 308 U/L    CK - MB 1.6 <3.6 ng/ml    CK-MB Index 1.5 0.0 - 4.0 %    Troponin-I, Qt. 0.02 0.0 - 0.045 NG/ML   CBC WITH AUTOMATED DIFF    Collection Time: 12/30/17  5:42 PM   Result Value Ref Range    WBC 7.4 4.6 - 13.2 K/uL    RBC 4.54 (L) 4.70 - 5.50 M/uL    HGB 12.5 (L) 13.0 - 16.0 g/dL    HCT 37.9 36.0 - 48.0 %    MCV 83.5 74.0 - 97.0 FL    MCH 27.5 24.0 - 34.0 PG    MCHC 33.0 31.0 - 37.0 g/dL    RDW 15.6 (H) 11.6 - 14.5 %    PLATELET 545 457 - 919 K/uL    MPV 10.4 9.2 - 11.8 FL    NEUTROPHILS 57 40 - 73 %    LYMPHOCYTES 27 21 - 52 %    MONOCYTES 8 3 - 10 %    EOSINOPHILS 6 (H) 0 - 5 %    BASOPHILS 2 0 - 2 %    ABS. NEUTROPHILS 4.3 1.8 - 8.0 K/UL    ABS. LYMPHOCYTES 2.0 0.9 - 3.6 K/UL    ABS. MONOCYTES 0.6 0.05 - 1.2 K/UL    ABS. EOSINOPHILS 0.5 (H) 0.0 - 0.4 K/UL    ABS. BASOPHILS 0.1 (H) 0.0 - 0.06 K/UL    DF AUTOMATED     HEPATIC FUNCTION PANEL    Collection Time: 12/30/17  5:42 PM   Result Value Ref Range    Protein, total 7.5 6.4 - 8.2 g/dL    Albumin 3.4 3.4 - 5.0 g/dL    Globulin 4.1 (H) 2.0 - 4.0 g/dL    A-G Ratio 0.8 0.8 - 1.7      Bilirubin, total 0.4 0.2 - 1.0 MG/DL    Bilirubin, direct <0.1 0.0 - 0.2 MG/DL    Alk. phosphatase 45 45 - 117 U/L    AST (SGOT) 16 15 - 37 U/L    ALT (SGPT) 22 16 - 61 U/L   LIPASE    Collection Time: 12/30/17  5:42 PM   Result Value Ref Range    Lipase 78 73 - 393 U/L       Radiologic Studies -   Ct Head Wo Cont    Result Date: 12/30/2017  CT HEAD WO CONT History: Dizziness, nausea after starting new medication. Comparison: None. TECHNIQUE: 5 mm helical scan obtained of the head were obtained from the skull vertex through the base of the skull without intravenous contrast.  All CT scans at this facility are performed using dose optimization technique as appropriate to a performed exam, to include automated exposure control, adjustment of the mA and/or kV according to patient size (including appropriate matching first site-specific examinations), or use of iterative reconstruction technique. BRAIN RESULT:  Acute change:   No evidence of an acute infarct or other acute parenchymal process. Hemorrhage:    No evidence of acute intracranial hemorrhage. Mass Effect / Mass Lesion:    There is no evidence of an intracranial mass or extraaxial fluid collection.   No significant mass effect. Chronic change:    None apparent. Parenchyma:  Mild generalized volume loss. The brain parenchyma is otherwise within normal limits for age. Ventricles:     Proportionate to prominence of sulci. Other:  The visualized paranasal sinuses are grossly clear. The skull and visualized extracranial soft tissues are grossly normal.      IMPRESSION:  No acute intracranial process. Mild generalized volume loss. Medical Decision Making   I am the first provider for this patient. I reviewed the vital signs, available nursing notes, past medical history, past surgical history, family history and social history. Vital Signs-Reviewed the patient's vital signs. Pulse Oximetry Analysis -  99% on room air (Interpretation)    EKG: Interpreted by the EP. Time Interpreted: 80   Rate: 68 bpm   Rhythm: Normal Sinus Rhythm with prior inferior infarct   Interpretation:   Comparison:     Records Reviewed: Nursing Notes, Old Medical Records, Previous electrocardiograms, Previous Radiology Studies and Previous Laboratory Studies (Time of Review: 6:27 PM)    ED Course: Progress Notes, Reevaluation, and Consults:      Provider Notes (Medical Decision Making):  Differential Diagnosis:  Vertigo, dysequilibrium (parkinson's, peripheral neuropathy), OM, anxiety, panic, depression, presyncope (orthostatic hypotension), medication adverse effect/withdrawal, arrhythmia, AMI, CVA, acoustic neuroma, brain tumor, MS, motion sickness, hypoglycemia, hypotension, hypoxemia, anemia     Plan:  Pt presents ambulatory in NAD, vitals wnl. Negative orthostatics. Labs c/w dehydration, hypokalemia. Otherwise unremarkable. Treated here with 500cc bolus, reglan, and benadryl. Feeling better. CT head negative. EKG shows NSR with prior infarct. Troponin wnl. Normal neuro exam.  Dizziness likely secondary to dehydration and hypokalemia. Advised to discontinue Topamax and follow-up for re-evaluation.       At this time, patient is stable and appropriate for discharge home. Patient demonstrates understanding of current diagnoses and is in agreement with the treatment plan. They are advised that while the likelihood of serious underlying condition is low at this point given the evaluation performed today, we cannot fully rule it out. They are advised to immediately return with any new symptoms or worsening of current condition. All questions have been answered. Patient is given educational material regarding their diagnoses, including danger symptoms and when to return to the ED. Follow-up with PCP        Diagnosis     Clinical Impression:   1. Dizziness    2. Chronic cluster headache, not intractable    3. Diarrhea, unspecified type    4. Dehydration    5. Hypokalemia        Disposition: [place] [facility]    Follow-up Information     Follow up With Details Comments RADHA Mckenna Call in 2 days For follow-up [address] [facility]  [phone]      [facility] EMERGENCY DEPT Go to As needed, If symptoms worsen [address] [facility] [address]  [phone]           Patient's Medications   Start Taking    ONDANSETRON (ZOFRAN ODT) 4 MG DISINTEGRATING TABLET    Take 1-2 tablets every 6-8 hours as needed for nausea and vomiting. POTASSIUM CHLORIDE (K-DUR, KLOR-CON) 20 MEQ TABLET    Take 1 Tab by mouth three (3) times daily for 3 days. Continue Taking    ATORVASTATIN (LIPITOR) 40 MG TABLET    Take 1 Tab by mouth daily. CITALOPRAM (CELEXA) 20 MG TABLET    Take 0.5 Tabs by mouth daily. HYDROCHLOROTHIAZIDE (HYDRODIURIL) 25 MG TABLET    take 1 tablet by mouth once daily    METOPROLOL TARTRATE (LOPRESSOR) 25 MG TABLET    take 1 tablet by mouth twice a day    RAMIPRIL (ALTACE) 10 MG CAPSULE    take 1 capsule by mouth once daily    RIVAROXABAN (XARELTO) 20 MG TAB TABLET    Take 1 Tab by mouth daily (with dinner). TOPIRAMATE (TOPAMAX) 25 MG TABLET    Take 50 mg by mouth nightly.     TRAZODONE (DESYREL) 150 MG TABLET    Take 150 mg by mouth nightly.    These Medications have changed    No medications on file   Stop Taking    No medications on file     _______________________________

## 2017-12-31 LAB
ATRIAL RATE: 68 BPM
CALCULATED P AXIS, ECG09: 64 DEGREES
CALCULATED R AXIS, ECG10: 52 DEGREES
CALCULATED T AXIS, ECG11: 60 DEGREES
DIAGNOSIS, 93000: NORMAL
P-R INTERVAL, ECG05: 152 MS
Q-T INTERVAL, ECG07: 390 MS
QRS DURATION, ECG06: 100 MS
QTC CALCULATION (BEZET), ECG08: 414 MS
VENTRICULAR RATE, ECG03: 68 BPM

## 2017-12-31 NOTE — DISCHARGE INSTRUCTIONS
Cluster Headache: Care Instructions  Your Care Instructions  Cluster headaches are very painful. They happen on one side of the head and often start at night. They can last for 30 minutes to several hours. They usually occur in groups, or clusters, over weeks or months. You may have a stuffy nose and watery eyes during the headaches. The cause of cluster headaches is not known. Medicine may help prevent cluster headaches. You also can try to avoid things that trigger your headaches. Follow-up care is a key part of your treatment and safety. Be sure to make and go to all appointments, and call your doctor if you are having problems. It's also a good idea to know your test results and keep a list of the medicines you take. How can you care for yourself at home? · Watch for new symptoms with a headache. These include fever, weakness or numbness, vision changes, or confusion. They may be signs of a more serious problem. · Be safe with medicines. Take your medicines exactly as prescribed. Call your doctor if you think you are having a problem with your medicine. You will get more details on the specific medicines your doctor prescribes. · If your doctor recommends it, take an over-the-counter pain medicine, such as acetaminophen (Tylenol), ibuprofen (Advil, Motrin), or naproxen (Aleve). Read and follow all instructions on the label. · Do not take two or more pain medicines at the same time unless the doctor told you to. Many pain medicines have acetaminophen, which is Tylenol. Too much acetaminophen (Tylenol) can be harmful. · Carry medicine with you to quickly treat a headache. · Put ice or a cold pack on the area for 10 to 20 minutes at a time. Put a thin cloth between the ice and your skin. · If your doctor prescribed at-home oxygen therapy to stop a cluster headache, follow the directions for using it. To prevent cluster headaches  · Keep a headache diary.  Avoiding triggers may help you prevent headaches. Write down when a headache begins, how long it lasts, and what might have triggered it. This could include stress, alcohol, or certain foods. · Exercise daily to lower stress. · Limit caffeine by not drinking too much coffee, tea, or soda. But do not quit caffeine suddenly. This can also give you headaches. · Do not smoke or allow others to smoke around you. If you need help quitting, talk to your doctor about stop-smoking programs and medicines. These can increase your chances of quitting for good. · Tell your doctor if your headaches get worse and medicines do not help. You may need to try a different medicine. When should you call for help? Call 911 anytime you think you may need emergency care. For example, call if:  ? · You have symptoms of a stroke. These may include:  ¨ Sudden numbness, tingling, weakness, or loss of movement in your face, arm, or leg, especially on only one side of your body. ¨ Sudden vision changes. ¨ Sudden trouble speaking. ¨ Sudden confusion or trouble understanding simple statements. ¨ Sudden problems with walking or balance. ¨ A sudden, severe headache that is different from past headaches. ?Call your doctor now or seek immediate medical care if:  ? · You have a fever with a stiff neck or a severe headache. ? · You are sensitive to light or feel very sleepy or confused. ? · You have new nausea and vomiting and you cannot keep down food or liquids. ? Watch closely for changes in your health, and be sure to contact your doctor if:  ? · You have a headache that does not get better within 1 or 2 days. ? · Your headaches get worse or happen more often. Where can you learn more? Go to http://tahmina-guy.info/. Enter Q889 in the search box to learn more about \"Cluster Headache: Care Instructions. \"  Current as of: October 14, 2016  Content Version: 11.4  © 3467-8095 Health[employer].  Care instructions adapted under license by [employer] (which disclaims liability or warranty for this information). If you have questions about a medical condition or this instruction, always ask your healthcare professional. [place] [address] any warranty or liability for your use of this information. Dehydration: Care Instructions  Your Care Instructions  Dehydration happens when your body loses too much fluid. This might happen when you do not drink enough water or you lose large amounts of fluids from your body because of diarrhea, vomiting, or sweating. Severe dehydration can be life-threatening. Water and minerals called electrolytes help put your body fluids back in balance. Learn the early signs of fluid loss, and drink more fluids to prevent dehydration. Follow-up care is a key part of your treatment and safety. Be sure to make and go to all appointments, and call your doctor if you are having problems. It's also a good idea to know your test results and keep a list of the medicines you take. How can you care for yourself at home? · To prevent dehydration, drink plenty of fluids, enough so that your urine is light yellow or clear like water. Choose water and other caffeine-free clear liquids until you feel better. If you have kidney, heart, or liver disease and have to limit fluids, talk with your doctor before you increase the amount of fluids you drink. · If you do not feel like eating or drinking, try taking small sips of water, sports drinks, or other rehydration drinks. · Get plenty of rest.  To prevent dehydration  · Add more fluids to your diet and daily routine, unless your doctor has told you not to. · During hot weather, drink more fluids. Drink even more fluids if you exercise a lot. Stay away from drinks with alcohol or caffeine. · Watch for the symptoms of dehydration. These include:  ¨ A dry, sticky mouth. ¨ Dark yellow urine, and not much of it. ¨ Dry and sunken eyes.   ¨ Feeling very tired.  · Learn what problems can lead to dehydration. These include:  ¨ Diarrhea, fever, and vomiting. ¨ Any illness with a fever, such as pneumonia or the flu. ¨ Activities that cause heavy sweating, such as endurance races and heavy outdoor work in hot or humid weather. ¨ Alcohol or drug abuse or withdrawal.  ¨ Certain medicines, such as cold and allergy pills (antihistamines), diet pills (diuretics), and laxatives. ¨ Certain diseases, such as diabetes, cancer, and heart or kidney disease. When should you call for help? Call 911 anytime you think you may need emergency care. For example, call if:  ? · You passed out (lost consciousness). ?Call your doctor now or seek immediate medical care if:  ? · You are confused and cannot think clearly. ? · You are dizzy or lightheaded, or you feel like you may faint. ? · You have signs of needing more fluids. You have sunken eyes and a dry mouth, and you pass only a little dark urine. ? · You cannot keep fluids down. ? Watch closely for changes in your health, and be sure to contact your doctor if:  ? · You are not making tears. ? · Your skin is very dry and sags slowly back into place after you pinch it. ? · Your mouth and eyes are very dry. Where can you learn more? Go to http://tahmina-guy.info/. Enter J125 in the search box to learn more about \"Dehydration: Care Instructions. \"  Current as of: March 20, 2017  Content Version: 11.4  © 6159-6210 [employer]. Care instructions adapted under license by [employer] (which disclaims liability or warranty for this information). If you have questions about a medical condition or this instruction, always ask your healthcare professional. [place] [address] any warranty or liability for your use of this information. Dizziness: Care Instructions  Your Care Instructions  Dizziness is the feeling of unsteadiness or fuzziness in your head.  It is different than having vertigo, which is a feeling that the room is spinning or that you are moving or falling. It is also different from lightheadedness, which is the feeling that you are about to faint. It can be hard to know what causes dizziness. Some people feel dizzy when they have migraine headaches. Sometimes bouts of flu can make you feel dizzy. Some medical conditions, such as heart problems or high blood pressure, can make you feel dizzy. Many medicines can cause dizziness, including medicines for high blood pressure, pain, or anxiety. If a medicine causes your symptoms, your doctor may recommend that you stop or change the medicine. If it is a problem with your heart, you may need medicine to help your heart work better. If there is no clear reason for your symptoms, your doctor may suggest watching and waiting for a while to see if the dizziness goes away on its own. Follow-up care is a key part of your treatment and safety. Be sure to make and go to all appointments, and call your doctor if you are having problems. It's also a good idea to know your test results and keep a list of the medicines you take. How can you care for yourself at home? · If your doctor recommends or prescribes medicine, take it exactly as directed. Call your doctor if you think you are having a problem with your medicine. · Do not drive while you feel dizzy. · Try to prevent falls. Steps you can take include:  ¨ Using nonskid mats, adding grab bars near the tub, and using night-lights. ¨ Clearing your home so that walkways are free of anything you might trip on. ¨ Letting family and friends know that you have been feeling dizzy. This will help them know how to help you. When should you call for help? Call 911 anytime you think you may need emergency care. For example, call if:  ? · You passed out (lost consciousness). ? · You have dizziness along with symptoms of a heart attack.  These may include:  ¨ Chest pain or pressure, or a strange feeling in the chest.  ¨ Sweating. ¨ Shortness of breath. ¨ Nausea or vomiting. ¨ Pain, pressure, or a strange feeling in the back, neck, jaw, or upper belly or in one or both shoulders or arms. ¨ Lightheadedness or sudden weakness. ¨ A fast or irregular heartbeat. ? · You have symptoms of a stroke. These may include:  ¨ Sudden numbness, tingling, weakness, or loss of movement in your face, arm, or leg, especially on only one side of your body. ¨ Sudden vision changes. ¨ Sudden trouble speaking. ¨ Sudden confusion or trouble understanding simple statements. ¨ Sudden problems with walking or balance. ¨ A sudden, severe headache that is different from past headaches. ?Call your doctor now or seek immediate medical care if:  ? · You feel dizzy and have a fever, headache, or ringing in your ears. ? · You have new or increased nausea and vomiting. ? · Your dizziness does not go away or comes back. ? Watch closely for changes in your health, and be sure to contact your doctor if:  ? · You do not get better as expected. Where can you learn more? Go to http://tahminaOffertiguy.info/. Enter W450 in the search box to learn more about \"Dizziness: Care Instructions. \"  Current as of: March 20, 2017  Content Version: 11.4  © 2414-1130 [employer]. Care instructions adapted under license by [employer] (which disclaims liability or warranty for this information). If you have questions about a medical condition or this instruction, always ask your healthcare professional. [place] [address] any warranty or liability for your use of this information. Diarrhea: Care Instructions  Your Care Instructions    Diarrhea is loose, watery stools (bowel movements). The exact cause is often hard to find. Sometimes diarrhea is your body's way of getting rid of what caused an upset stomach.  Viruses, food poisoning, and many medicines can cause diarrhea. Some people get diarrhea in response to emotional stress, anxiety, or certain foods. Almost everyone has diarrhea now and then. It usually isn't serious, and your stools will return to normal soon. The important thing to do is replace the fluids you have lost, so you can prevent dehydration. The doctor has checked you carefully, but problems can develop later. If you notice any problems or new symptoms, get medical treatment right away. Follow-up care is a key part of your treatment and safety. Be sure to make and go to all appointments, and call your doctor if you are having problems. It's also a good idea to know your test results and keep a list of the medicines you take. How can you care for yourself at home? · Watch for signs of dehydration, which means your body has lost too much water. Dehydration is a serious condition and should be treated right away. Signs of dehydration are:  ¨ Increasing thirst and dry eyes and mouth. ¨ Feeling faint or lightheaded. ¨ Darker urine, and a smaller amount of urine than normal.  · To prevent dehydration, drink plenty of fluids, enough so that your urine is light yellow or clear like water. Choose water and other caffeine-free clear liquids until you feel better. If you have kidney, heart, or liver disease and have to limit fluids, talk with your doctor before you increase the amount of fluids you drink. · Begin eating small amounts of mild foods the next day, if you feel like it. ¨ Try yogurt that has live cultures of Lactobacillus. (Check the label.)  ¨ Avoid spicy foods, fruits, alcohol, and caffeine until 48 hours after all symptoms are gone. ¨ Avoid chewing gum that contains sorbitol. ¨ Avoid dairy products (except for yogurt with Lactobacillus) while you have diarrhea and for 3 days after symptoms are gone.   · The doctor may recommend that you take over-the-counter medicine, such as loperamide (Imodium), if you still have diarrhea after 6 hours. Read and follow all instructions on the label. Do not use this medicine if you have bloody diarrhea, a high fever, or other signs of serious illness. Call your doctor if you think you are having a problem with your medicine. When should you call for help? Call 911 anytime you think you may need emergency care. For example, call if:  ? · You passed out (lost consciousness). ? · Your stools are maroon or very bloody. ?Call your doctor now or seek immediate medical care if:  ? · You are dizzy or lightheaded, or you feel like you may faint. ? · Your stools are black and look like tar, or they have streaks of blood. ? · You have new or worse belly pain. ? · You have symptoms of dehydration, such as:  ¨ Dry eyes and a dry mouth. ¨ Passing only a little dark urine. ¨ Feeling thirstier than usual.   ? · You have a new or higher fever. ? Watch closely for changes in your health, and be sure to contact your doctor if:  ? · Your diarrhea is getting worse. ? · You see pus in the diarrhea. ? · You are not getting better after 2 days (48 hours). Where can you learn more? Go to http://tahmina-guy.info/. Enter Z656 in the search box to learn more about \"Diarrhea: Care Instructions. \"  Current as of: March 20, 2017  Content Version: 11.4  © 4149-5999 [employer]. Care instructions adapted under license by [employer] [employer] (which disclaims liability or warranty for this information). If you have questions about a medical condition or this instruction, always ask your healthcare professional. [place] [address] any warranty or liability for your use of this information. Hypokalemia: Care Instructions  Your Care Instructions    Hypokalemia (say \"el-gw-fqq-LALITA-narda-uh\") is a low level of potassium. The heart, muscles, kidneys, and nervous system all need potassium to work well. This problem has many different causes.  Kidney problems, diet, and medicines like diuretics and laxatives can cause it. So can vomiting or diarrhea. In some cases, cancer is the cause. Your doctor may do tests to find the cause of your low potassium levels. You may need medicines to bring your potassium levels back to normal. You may also need regular blood tests to check your potassium. If you have very low potassium, you may need intravenous (IV) medicines. You also may need tests to check the electrical activity of your heart. Heart problems caused by low potassium levels can be very serious. Follow-up care is a key part of your treatment and safety. Be sure to make and go to all appointments, and call your doctor if you are having problems. It's also a good idea to know your test results and keep a list of the medicines you take. How can you care for yourself at home? · If your doctor recommends it, eat foods that have a lot of potassium. These include fresh fruits, juices, and vegetables. They also include nuts, beans, and milk. · Be safe with medicines. If your doctor prescribes medicines or potassium supplements, take them exactly as directed. Call your doctor if you have any problems with your medicines. · Get your potassium levels tested as often as your doctor tells you. When should you call for help? Call 911 anytime you think you may need emergency care. For example, call if:  ? · You feel like your heart is missing beats. Heart problems caused by low potassium can cause death. ? · You passed out (lost consciousness). ? · You have a seizure. ?Call your doctor now or seek immediate medical care if:  ? · You feel weak or unusually tired. ? · You have severe arm or leg cramps. ? · You have tingling or numbness. ? · You feel sick to your stomach, or you vomit. ? · You have belly cramps. ? · You feel bloated or constipated. ? · You have to urinate a lot. ? · You feel very thirsty most of the time.    ? · You are dizzy or lightheaded, or you feel like you may faint. ? · You feel depressed, or you lose touch with reality. ? Watch closely for changes in your health, and be sure to contact your doctor if:  ? · You do not get better as expected. Where can you learn more? Go to http://tahmina-guy.info/. Enter G358 in the search box to learn more about \"Hypokalemia: Care Instructions. \"  Current as of: May 12, 2017  Content Version: 11.4  © 2867-1234 [employer]wise, [employer]. Care instructions adapted under license by [employer] (which disclaims liability or warranty for this information). If you have questions about a medical condition or this instruction, always ask your healthcare professional. [address] any warranty or liability for your use of this information.

## 2018-03-05 RX ORDER — RIVAROXABAN 20 MG/1
TABLET, FILM COATED ORAL
Qty: 30 TAB | Refills: 4 | Status: SHIPPED | OUTPATIENT
Start: 2018-03-05 | End: 2018-07-29 | Stop reason: SDUPTHER

## 2018-04-23 RX ORDER — METOPROLOL TARTRATE 25 MG/1
TABLET, FILM COATED ORAL
Qty: 60 TAB | Refills: 6 | Status: SHIPPED | OUTPATIENT
Start: 2018-04-23 | End: 2018-12-05 | Stop reason: SDUPTHER

## 2018-05-11 RX ORDER — ONDANSETRON 4 MG/1
TABLET, ORALLY DISINTEGRATING ORAL
Qty: 10 TAB | Refills: 0 | Status: SHIPPED | OUTPATIENT
Start: 2018-05-11 | End: 2020-01-27 | Stop reason: ALTCHOICE

## 2018-07-19 RX ORDER — RAMIPRIL 10 MG/1
CAPSULE ORAL
Qty: 90 CAP | Refills: 3 | Status: SHIPPED | OUTPATIENT
Start: 2018-07-19 | End: 2019-07-15 | Stop reason: SDUPTHER

## 2018-07-31 RX ORDER — RIVAROXABAN 20 MG/1
TABLET, FILM COATED ORAL
Qty: 30 TAB | Refills: 4 | Status: SHIPPED | OUTPATIENT
Start: 2018-07-31 | End: 2018-12-24 | Stop reason: SDUPTHER

## 2018-08-06 RX ORDER — HYDROCHLOROTHIAZIDE 25 MG/1
TABLET ORAL
Qty: 30 TAB | Refills: 6 | Status: SHIPPED | OUTPATIENT
Start: 2018-08-06 | End: 2019-02-22 | Stop reason: SDUPTHER

## 2018-12-06 RX ORDER — METOPROLOL TARTRATE 25 MG/1
TABLET, FILM COATED ORAL
Qty: 180 TAB | Refills: 3 | Status: SHIPPED | OUTPATIENT
Start: 2018-12-06 | End: 2020-02-18

## 2018-12-27 RX ORDER — RIVAROXABAN 20 MG/1
TABLET, FILM COATED ORAL
Qty: 30 TAB | Refills: 4 | Status: SHIPPED | OUTPATIENT
Start: 2018-12-27 | End: 2019-05-28 | Stop reason: SDUPTHER

## 2019-02-22 RX ORDER — HYDROCHLOROTHIAZIDE 25 MG/1
TABLET ORAL
Qty: 30 TAB | Refills: 6 | Status: SHIPPED | OUTPATIENT
Start: 2019-02-22 | End: 2019-09-06 | Stop reason: SDUPTHER

## 2019-05-28 ENCOUNTER — TELEPHONE (OUTPATIENT)
Dept: ONCOLOGY | Age: 54
End: 2019-05-28

## 2019-05-28 RX ORDER — RIVAROXABAN 20 MG/1
TABLET, FILM COATED ORAL
Qty: 30 TAB | Refills: 4 | Status: SHIPPED | OUTPATIENT
Start: 2019-05-28 | End: 2019-07-16

## 2019-05-28 NOTE — TELEPHONE ENCOUNTER
Patient left voice mail unable to have Xarelto refilled must have new prescription. Can contact [address] [place] [address] [phone]. Patient hasn't been seen since 12/23/2016 per Bustrista Mccauley patient needs to be seen first before refilled.  Patient is scheduled for 05/31/2019 and requested 10:00am.

## 2019-05-31 ENCOUNTER — HOSPITAL ENCOUNTER (OUTPATIENT)
Dept: ONCOLOGY | Age: 54
Discharge: HOME | End: 2019-05-31

## 2019-05-31 ENCOUNTER — OFFICE VISIT (OUTPATIENT)
Dept: ONCOLOGY | Age: 54
End: 2019-05-31

## 2019-05-31 VITALS
TEMPERATURE: 97.2 F | DIASTOLIC BLOOD PRESSURE: 72 MMHG | SYSTOLIC BLOOD PRESSURE: 101 MMHG | OXYGEN SATURATION: 100 % | RESPIRATION RATE: 16 BRPM | BODY MASS INDEX: 32.42 KG/M2 | HEIGHT: 71 IN | WEIGHT: 231.6 LBS | HEART RATE: 67 BPM

## 2019-05-31 DIAGNOSIS — C49.A0 GASTROINTESTINAL STROMAL TUMOR (HCC): ICD-10-CM

## 2019-05-31 DIAGNOSIS — I26.99 PULMONARY EMBOLISM WITHOUT ACUTE COR PULMONALE, UNSPECIFIED CHRONICITY, UNSPECIFIED PULMONARY EMBOLISM TYPE (HCC): Primary | ICD-10-CM

## 2019-05-31 DIAGNOSIS — D75.1 ERYTHROCYTOSIS: ICD-10-CM

## 2019-05-31 LAB
BASO+EOS+MONOS # BLD AUTO: 0.9 K/UL (ref 0–2.3)
BASO+EOS+MONOS NFR BLD AUTO: 11 % (ref 0.1–17)
DIFFERENTIAL METHOD BLD: NORMAL
ERYTHROCYTE [DISTWIDTH] IN BLOOD BY AUTOMATED COUNT: 14.1 % (ref 11.5–14.5)
HCT VFR BLD AUTO: 47.7 % (ref 36–48)
HGB BLD-MCNC: 15.8 G/DL (ref 12–16)
LYMPHOCYTES # BLD: 1.6 K/UL (ref 1.1–5.9)
LYMPHOCYTES NFR BLD: 21 % (ref 14–44)
MCH RBC QN AUTO: 31 PG (ref 25–35)
MCHC RBC AUTO-ENTMCNC: 33.1 G/DL (ref 31–37)
MCV RBC AUTO: 93.7 FL (ref 78–102)
NEUTS SEG # BLD: 5.2 K/UL (ref 1.8–9.5)
NEUTS SEG NFR BLD: 68 % (ref 40–70)
PLATELET # BLD AUTO: 255 K/UL (ref 140–440)
RBC # BLD AUTO: 5.09 M/UL (ref 4.1–5.1)
WBC # BLD AUTO: 7.7 K/UL (ref 4.5–13)

## 2019-05-31 NOTE — PROGRESS NOTES
Hematology/Oncology  Progress Note    Name: Anitha Silva  Date: 2019  : 1965    RADHA Hubbard     Mr. Cornelia Arriaza is a 52y.o. year old male who was seen for Pulmonary emboli in the setting of acquired thrombophilia    Current therapy: Xarelto 20mg PO daily. Subjective:     History of present illness    Mr. Cornelia Arriaza is a 57-year-old man who was diagnosed with a pulmonary embolus in May of 2016. He states that he is taking Xarelto 20mg PO daily. He admits to not showing up on his follow up appointment due to personal reasons. He also admits smoking 1 1/2 ppd and states he is willing to cut back. He denies any hospitalizations since he was seen in the clinic. He denies fatigue, shortness of breath, and dizziness. He also denies pain or any discomfort. He states he will be more compliant to his follow up appointments. He does not have any other concerns or complaints to report at this time. Past medical history, family history, and social history: these were reviewed and remains unchanged.     Past Medical History:   Diagnosis Date    CAD (coronary artery disease)     Carpal tunnel syndrome     COPD     Depression     H/O echocardiogram 2016    EF 35-40%, mild RVE, normal PA pressures    Hemorrhoids     History of esophagitis     History of malignant gastrointestinal stromal tumor (GIST)     Hypercholesterolemia     Hypertension     Myocardial infarction [facility])     Pulmonary embolus ([facility] [address] 2016    Bilateral    S/P CABG x 3     LIMA to LAD, SVG to RPDA, SVG to diagonal    S/P cardiac catheterization 2011    Patent GALLAGHER to LAD, patent SVG to diet, and occluded SVG to RPDA,  native LAD 85% proximal stenosis, left circumflex 20% diffuse disease, RCA distal 70% stenosis,, EF 45%     Past Surgical History:   Procedure Laterality Date    HX CORONARY STENT PLACEMENT      HX CYST REMOVAL      HX HEMORRHOIDECTOMY      HX ORTHOPAEDIC      knee    HX OTHER SURGICAL      resection of mesenteric mass     Social History     Socioeconomic History    Marital status: SINGLE     Spouse name: Not on file    Number of children: Not on file    Years of education: Not on file    Highest education level: Not on file   Occupational History    Not on file   Social Needs    Financial resource strain: Not on file    Food insecurity:     Worry: Not on file     Inability: Not on file    Transportation needs:     Medical: Not on file     Non-medical: Not on file   Tobacco Use    Smoking status: Current Every Day Smoker     Packs/day: 1.00    Smokeless tobacco: Never Used   Substance and Sexual Activity    Alcohol use: Yes     Comment: \"I haven't drank in 2 months, but I was drinking about 12 pk beer per week\"     Drug use: No    Sexual activity: Not on file   Lifestyle    Physical activity:     Days per week: Not on file     Minutes per session: Not on file    Stress: Not on file   Relationships    Social connections:     Talks on phone: Not on file     Gets together: Not on file     Attends [employer] service: Not on file     Active member of club or organization: Not on file     Attends meetings of clubs or organizations: Not on file     Relationship status: Not on file    Intimate partner violence:     Fear of current or ex partner: Not on file     Emotionally abused: Not on file     Physically abused: Not on file     Forced sexual activity: Not on file   Other Topics Concern    Not on file   Social History Narrative    Not on file     No family history on file.   Current Outpatient Medications   Medication Sig Dispense Refill    XARELTO 20 mg tab tablet take 1 tablet by mouth once daily WITH DINNER 30 Tab 4    hydroCHLOROthiazide (HYDRODIURIL) 25 mg tablet take 1 tablet by mouth once daily 30 Tab 6    metoprolol tartrate (LOPRESSOR) 25 mg tablet take 1 tablet by mouth twice a day 180 Tab 3    ramipril (ALTACE) 10 mg capsule take 1 capsule by mouth once daily 90 Cap 3    ondansetron (ZOFRAN ODT) 4 mg disintegrating tablet dissolve 1 to 2 tablets ON TONGUE every 6 to 8 hours if needed for nausea and vomiting 10 Tab 0    rivaroxaban (XARELTO) 20 mg tab tablet Take 1 Tab by mouth daily (with dinner). 30 Tab 4    predniSONE (DELTASONE) 10 mg tablet Take six pills on Day 1, five pills on Day 2, four pills on Day 3, three pills on Day 4, two pills on Day 5, and one pill on Day 6. 21 Tab 0    atorvastatin (LIPITOR) 40 mg tablet Take 1 Tab by mouth daily. 30 Tab 6    citalopram (CELEXA) 20 mg tablet Take 0.5 Tabs by mouth daily. 15 Tab 2    topiramate (TOPAMAX) 25 mg tablet Take 50 mg by mouth nightly.  traZODone (DESYREL) 150 mg tablet Take 150 mg by mouth nightly. Review of Systems  Constitutional: The patient has no acute distress or discomfort. HEENT: The patient denies recent head trauma, eye pain, blurred vision,  hearing deficit, oropharyngeal mucosal pain or lesions, and the patient denies throat pain or discomfort. Lymphatics: The patient denies palpable peripheral lymphadenopathy. Hematologic: The patient denies having bruising, bleeding, or progressive fatigue. Respiratory: Patient denies having shortness of breath, cough, sputum production, fever, or dyspnea on exertion. Cardiovascular: The patient denies having leg pain, leg swelling, heart palpitations, chest permit, chest pain, or lightheadedness. The patient denies having dyspnea on exertion. Gastrointestinal: The patient denies having nausea, emesis, or diarrhea. The patient denies having any hematemesis or blood in the stool. Genitourinary: Patient denies having urinary urgency, frequency, or dysuria. The patient denies having blood in the urine. Psychological: The patient denies having symptoms of nervousness, anxiety, depression, or thoughts of harming himself some of this.   Skin: Patient denies having skin rashes, skin, ulcerations, or unexplained itching or pruritus. Musculoskeletal: The patient denies having pain in the joints or bones. Objective:     Visit Vitals  /72   Pulse 67   Temp 97.2 °F (36.2 °C) (Oral)   Resp 16   Ht 5' 11\" (1.803 m)   Wt 105.1 kg (231 lb 9.6 oz)   SpO2 100%   BMI 32.30 kg/m²     ECOG PS0, pain 0/10  Physical Exam:   Gen. Appearance: The patient is in no acute distress. Skin: There is no bruise or rash. HEENT: The exam is unremarkable. Neck: Supple without lymphadenopathy or thyromegaly. Lungs: Clear to auscultation and percussion; there are no wheezes or rhonchi. Heart: Regular rate and rhythm; there are no murmurs, gallops, or rubs. Abdomen: Bowel sounds are present and normal.  There is no guarding, tenderness, or hepatosplenomegaly. Extremities: There is no clubbing, cyanosis, or edema. Neurologic: There are no focal neurologic deficits. Lymphatics: There is no palpable peripheral lymphadenopathy. Musculoskeletal: The patient has full range of motion at all joints. There is no evidence of joint deformity or effusions. There is no focal joint tenderness. Psychological/psychiatric: There is no clinical evidence of anxiety, depression, or melancholy. Lab data:      Results for orders placed or performed during the hospital encounter of 05/31/19   CBC WITH 3 PART DIFF     Status: None   Result Value Ref Range Status    WBC 7.7 4.5 - 13.0 K/uL Final    RBC 5.09 4. 10 - 5.10 M/uL Final    HGB 15.8 12.0 - 16.0 g/dL Final    HCT 47.7 36 - 48 % Final    MCV 93.7 78 - 102 FL Final    MCH 31.0 25.0 - 35.0 PG Final    MCHC 33.1 31 - 37 g/dL Final    RDW 14.1 11.5 - 14.5 % Final    PLATELET 281 597 - 516 K/uL Final    NEUTROPHILS 68 40 - 70 % Final    MIXED CELLS 11 0.1 - 17 % Final    LYMPHOCYTES 21 14 - 44 % Final    ABS. NEUTROPHILS 5.2 1.8 - 9.5 K/UL Final    ABS. MIXED CELLS 0.9 0.0 - 2.3 K/uL Final    ABS.  LYMPHOCYTES 1.6 1.1 - 5.9 K/UL Final     Comment: Test performed at [facility] or Outpatient Infusion Center Location. Reviewed by Medical Director. DF AUTOMATED   Final           Assessment:     1. Pulmonary embolism without acute cor pulmonale, unspecified chronicity, unspecified pulmonary embolism type ([address]    2. Erythrocytosis        Plan:   1. PE: Patient will continue to take the [facility] as ordered. Rx will be sent to his pharmacy at this time. Patient education was given on smoking cessation. CMP will be obtained today. 2. Erythrocytosis/Elevated H&H: I have informed the patient that his hemoglobin today is 15.8g/dl and hematocrit is 47.7%. He denies any blurred vision or headaches. He states he smokes 11/2 pack of cigarettes a day. I have counseled him on smoking cessation. I have encouraged him to drink plenty of water and start exercising. Therapeutic Phlebotomy will be started. This will be done everytime  his hematocrit is >45%. I will see him back in the office for reassessment in 4 weeks or sooner if indicated. Orders Placed This Encounter    COMPLETE CBC & AUTO DIFF WBC    InHouse CBC ([facility])     Standing Status:   Future     Number of Occurrences:   1     Standing Expiration Date:   6/7/2019    IRON PROFILE     Standing Status:   Future     Standing Expiration Date:   5/31/2020    FERRITIN     Standing Status:   Future     Standing Expiration Date:   4/78/0587    METABOLIC PANEL, COMPREHENSIVE     Standing Status:   Future     Standing Expiration Date:   5/31/2020       Nan Bun, NP  5/31/2019     I have assessed the patient independently and  agree with the full assessment as outlined. Wendi Chakraborty MD, FACP      Please note: This document has been produced using voice recognition software. Unrecognized errors in transcription may be present.

## 2019-06-01 LAB
A-G RATIO,AGRAT: 1.4 RATIO (ref 1.1–2.6)
ALBUMIN SERPL-MCNC: 4.3 G/DL (ref 3.5–5)
ALP SERPL-CCNC: 50 U/L (ref 25–115)
ALT SERPL-CCNC: 13 U/L (ref 5–40)
ANION GAP SERPL CALC-SCNC: 14 MMOL/L
AST SERPL W P-5'-P-CCNC: 17 U/L (ref 10–37)
BILIRUB SERPL-MCNC: 0.3 MG/DL (ref 0.2–1.2)
BUN SERPL-MCNC: 14 MG/DL (ref 6–22)
CALCIUM SERPL-MCNC: 9.7 MG/DL (ref 8.4–10.4)
CHLORIDE SERPL-SCNC: 99 MMOL/L (ref 98–110)
CO2 SERPL-SCNC: 24 MMOL/L (ref 20–32)
CREAT SERPL-MCNC: 0.9 MG/DL (ref 0.5–1.2)
FE % SATURATION,PSAT: 22 % (ref 20–50)
FERRITIN SERPL-MCNC: 35 NG/ML (ref 22–322)
GFRAA, 66117: >60
GFRNA, 66118: >60
GLOBULIN,GLOB: 3.1 G/DL (ref 2–4)
GLUCOSE SERPL-MCNC: 112 MG/DL (ref 70–99)
IRON,IRN: 64 MCG/DL (ref 45–160)
POTASSIUM SERPL-SCNC: 4.6 MMOL/L (ref 3.5–5.5)
PROT SERPL-MCNC: 7.4 G/DL (ref 6.4–8.3)
SODIUM SERPL-SCNC: 137 MMOL/L (ref 133–145)
TIBC,TIBC: 297 MCG/DL (ref 228–428)
UIBC SERPL-MCNC: 233 MCG/DL (ref 110–370)

## 2019-06-17 NOTE — PATIENT INSTRUCTIONS
Rivaroxaban (Xarelto, Xarelto Starter Pack) - (By mouth)   Why this medicine is used:   Treats and prevents blood clots. Contact a nurse or doctor right away if you have:  · Sudden or severe headache  · Back pain, numbness, tingling, weakness in your legs or feet  · Loss of bladder or bowel control  · Bloody vomit or vomit that looks like coffee grounds; bloody or black, tarry stools  · Bleeding that does not stop or bruises that do not heal     Common side effects:  · Minor bleeding or bruising  © 2017 [address] [employer] Information is for End User's use only and may not be sold, redistributed or otherwise used for commercial purposes.
Yes

## 2019-06-20 ENCOUNTER — HOSPITAL ENCOUNTER (OUTPATIENT)
Dept: INFUSION THERAPY | Age: 54
Discharge: HOME | End: 2019-06-20
Payer: COMMERCIAL

## 2019-06-20 ENCOUNTER — CLINICAL SUPPORT (OUTPATIENT)
Dept: ONCOLOGY | Age: 54
End: 2019-06-20

## 2019-06-20 ENCOUNTER — HOSPITAL ENCOUNTER (OUTPATIENT)
Dept: ONCOLOGY | Age: 54
Discharge: HOME | End: 2019-06-20

## 2019-06-20 VITALS
SYSTOLIC BLOOD PRESSURE: 120 MMHG | TEMPERATURE: 97.9 F | HEART RATE: 84 BPM | DIASTOLIC BLOOD PRESSURE: 83 MMHG | RESPIRATION RATE: 18 BRPM | OXYGEN SATURATION: 93 %

## 2019-06-20 DIAGNOSIS — D75.1 ERYTHROCYTOSIS: ICD-10-CM

## 2019-06-20 DIAGNOSIS — D75.1 ERYTHROCYTOSIS: Primary | ICD-10-CM

## 2019-06-20 LAB
BASO+EOS+MONOS # BLD AUTO: 0.8 K/UL (ref 0–2.3)
BASO+EOS+MONOS NFR BLD AUTO: 11 % (ref 0.1–17)
DIFFERENTIAL METHOD BLD: ABNORMAL
ERYTHROCYTE [DISTWIDTH] IN BLOOD BY AUTOMATED COUNT: 14.9 % (ref 11.5–14.5)
HCT VFR BLD AUTO: 44.2 % (ref 36–48)
HGB BLD-MCNC: 15.1 G/DL (ref 12–16)
LYMPHOCYTES # BLD: 1.7 K/UL (ref 1.1–5.9)
LYMPHOCYTES NFR BLD: 22 % (ref 14–44)
MCH RBC QN AUTO: 32.3 PG (ref 25–35)
MCHC RBC AUTO-ENTMCNC: 34.2 G/DL (ref 31–37)
MCV RBC AUTO: 94.4 FL (ref 78–102)
NEUTS SEG # BLD: 5.3 K/UL (ref 1.8–9.5)
NEUTS SEG NFR BLD: 67 % (ref 40–70)
PLATELET # BLD AUTO: 235 K/UL (ref 140–440)
RBC # BLD AUTO: 4.68 M/UL (ref 4.1–5.1)
WBC # BLD AUTO: 7.8 K/UL (ref 4.5–13)

## 2019-06-20 PROCEDURE — 36415 COLL VENOUS BLD VENIPUNCTURE: CPT

## 2019-06-20 NOTE — PROGRESS NOTES
[facility] Progress Note    Date: 2019    Name: Carl Lind    MRN: [id number]         : 1965      Mr. Deon Lyons arrived to [facility] at 200. Mr. Deon Lyons was assessed and education was provided. Mr. Cruz vitals were reviewed. Visit Vitals  /83 (BP 1 Location: Left arm, BP Patient Position: At rest;Sitting)   Pulse 84   Temp 97.9 °F (36.6 °C)   Resp 18   SpO2 93%       Blood drawn for labs via Right antecubital venipuncture x1 attempt. Gauze and bandaid applied. Lab results were obtained and reviewed. Recent Results (from the past 12 hour(s))   CBC WITH 3 PART DIFF    Collection Time: 19 11:29 AM   Result Value Ref Range    WBC 7.8 4.5 - 13.0 K/uL    RBC 4.68 4.10 - 5.10 M/uL    HGB 15.1 12.0 - 16.0 g/dL    HCT 44.2 36 - 48 %    MCV 94.4 78 - 102 FL    MCH 32.3 25.0 - 35.0 PG    MCHC 34.2 31 - 37 g/dL    RDW 14.9 (H) 11.5 - 14.5 %    PLATELET 356 710 - 309 K/uL    NEUTROPHILS 67 40 - 70 %    MIXED CELLS 11 0.1 - 17 %    LYMPHOCYTES 22 14 - 44 %    ABS. NEUTROPHILS 5.3 1.8 - 9.5 K/UL    ABS. MIXED CELLS 0.8 0.0 - 2.3 K/uL    ABS. LYMPHOCYTES 1.7 1.1 - 5.9 K/UL    DF AUTOMATED         Therapeutic Phlebotomy held per order parameter. Mr. Deon Lyons tolerated well without complaints. Mr. Deon Lyons was discharged from [place] [address] in stable condition at 1135. He is to return on 19 at 1300 for his next appointment.     Siri Ramos RN  2019  1152

## 2019-06-27 ENCOUNTER — HOSPITAL ENCOUNTER (OUTPATIENT)
Dept: INFUSION THERAPY | Age: 54
End: 2019-06-27
Payer: COMMERCIAL

## 2019-06-28 ENCOUNTER — OFFICE VISIT (OUTPATIENT)
Dept: ONCOLOGY | Age: 54
End: 2019-06-28

## 2019-06-28 ENCOUNTER — HOSPITAL ENCOUNTER (OUTPATIENT)
Dept: ONCOLOGY | Age: 54
Discharge: HOME | End: 2019-06-28

## 2019-06-28 VITALS
DIASTOLIC BLOOD PRESSURE: 78 MMHG | HEART RATE: 71 BPM | BODY MASS INDEX: 32.34 KG/M2 | OXYGEN SATURATION: 98 % | SYSTOLIC BLOOD PRESSURE: 110 MMHG | TEMPERATURE: 97.6 F | RESPIRATION RATE: 16 BRPM | WEIGHT: 231 LBS | HEIGHT: 71 IN

## 2019-06-28 DIAGNOSIS — I26.99 PULMONARY EMBOLISM WITHOUT ACUTE COR PULMONALE, UNSPECIFIED CHRONICITY, UNSPECIFIED PULMONARY EMBOLISM TYPE (HCC): ICD-10-CM

## 2019-06-28 DIAGNOSIS — D75.1 ERYTHROCYTOSIS: Primary | ICD-10-CM

## 2019-06-28 LAB
BASO+EOS+MONOS # BLD AUTO: 1 K/UL (ref 0–2.3)
BASO+EOS+MONOS NFR BLD AUTO: 12 % (ref 0.1–17)
DIFFERENTIAL METHOD BLD: ABNORMAL
ERYTHROCYTE [DISTWIDTH] IN BLOOD BY AUTOMATED COUNT: 14.9 % (ref 11.5–14.5)
HCT VFR BLD AUTO: 47.2 % (ref 36–48)
HGB BLD-MCNC: 15.8 G/DL (ref 12–16)
LYMPHOCYTES # BLD: 1.8 K/UL (ref 1.1–5.9)
LYMPHOCYTES NFR BLD: 23 % (ref 14–44)
MCH RBC QN AUTO: 32 PG (ref 25–35)
MCHC RBC AUTO-ENTMCNC: 33.5 G/DL (ref 31–37)
MCV RBC AUTO: 95.5 FL (ref 78–102)
NEUTS SEG # BLD: 5.3 K/UL (ref 1.8–9.5)
NEUTS SEG NFR BLD: 65 % (ref 40–70)
PLATELET # BLD AUTO: 278 K/UL (ref 140–440)
RBC # BLD AUTO: 4.94 M/UL (ref 4.1–5.1)
WBC # BLD AUTO: 8.1 K/UL (ref 4.5–13)

## 2019-06-28 NOTE — PATIENT INSTRUCTIONS
Rivaroxaban (Xarelto, Xarelto Starter Pack) - (By mouth) Why this medicine is used:  
Treats and prevents blood clots. Contact a nurse or doctor right away if you have: 
· Sudden or severe headache · Back pain, numbness, tingling, weakness in your legs or feet · Loss of bladder or bowel control · Bloody vomit or vomit that looks like coffee grounds; bloody or black, tarry stools · Bleeding that does not stop or bruises that do not heal  
 
Common side effects: · Minor bleeding or bruising © 2017 [facility] Information is for End User's use only and may not be sold, redistributed or otherwise used for commercial purposes.

## 2019-06-28 NOTE — PROGRESS NOTES
Hematology/Oncology  Progress Note    Name: Sandip Aguayo  Date: 2019  : 1965    RADHA Garcia     Mr. Sly Laura is a 62y.o. year old male who was seen for Pulmonary emboli in the setting of acquired thrombophilia    Current therapy: Xarelto 20mg PO daily. Subjective:     History of present illness    Mr. Syl Laura is a 57-year-old man who was diagnosed with a pulmonary embolus in May of 2016. He states that he is taking Xarelto 20mg PO daily. He admits smoking 1 1/2 ppd and states he is willing to cut back. He denies any hospitalizations since he was seen in the clinic. He denies fatigue, shortness of breath, and dizziness. He also denies pain or any discomfort. He states he will be more compliant to his follow up appointments. He does not have any other concerns or complaints to report at this time. Past medical history, family history, and social history: these were reviewed and remains unchanged.     Past Medical History:   Diagnosis Date    CAD (coronary artery disease)     Carpal tunnel syndrome     COPD     Depression     H/O echocardiogram 2016    EF 35-40%, mild RVE, normal PA pressures    Hemorrhoids     History of esophagitis     History of malignant gastrointestinal stromal tumor (GIST)     Hypercholesterolemia     Hypertension     [facility])     Pulmonary embolus ([facility] [address] 2016    Bilateral    S/P CABG x 3     LIMA to LAD, SVG to RPDA, SVG to diagonal    S/P cardiac catheterization 2011    Patent GALLAGHER to LAD, patent SVG to diet, and occluded SVG to RPDA,  native LAD 85% proximal stenosis, left circumflex 20% diffuse disease, RCA distal 70% stenosis,, EF 45%     Past Surgical History:   Procedure Laterality Date    HX CORONARY STENT PLACEMENT      HX CYST REMOVAL      HX HEMORRHOIDECTOMY      HX ORTHOPAEDIC      knee    HX OTHER SURGICAL      resection of mesenteric mass     Social History     Socioeconomic History  Marital status: SINGLE     Spouse name: Not on file    Number of children: Not on file    Years of education: Not on file    Highest education level: Not on file   Occupational History    Not on file   Social Needs    Financial resource strain: Not on file    Food insecurity:     Worry: Not on file     Inability: Not on file    Transportation needs:     Medical: Not on file     Non-medical: Not on file   Tobacco Use    Smoking status: Current Every Day Smoker     Packs/day: 1.00    Smokeless tobacco: Never Used   Substance and Sexual Activity    Alcohol use: Yes     Comment: \"I haven't drank in 2 months, but I was drinking about 12 pk beer per week\"     Drug use: No    Sexual activity: Not on file   Lifestyle    Physical activity:     Days per week: Not on file     Minutes per session: Not on file    Stress: Not on file   Relationships    Social connections:     Talks on phone: Not on file     Gets together: Not on file     Attends Worship service: Not on file     Active member of club or organization: Not on file     Attends meetings of clubs or organizations: Not on file     Relationship status: Not on file    Intimate partner violence:     Fear of current or ex partner: Not on file     Emotionally abused: Not on file     Physically abused: Not on file     Forced sexual activity: Not on file   Other Topics Concern    Not on file   Social History Narrative    Not on file     No family history on file. Current Outpatient Medications   Medication Sig Dispense Refill    rivaroxaban (XARELTO) 20 mg tab tablet Take 1 Tab by mouth daily (with dinner).  90 Tab 3    XARELTO 20 mg tab tablet take 1 tablet by mouth once daily WITH DINNER 30 Tab 4    hydroCHLOROthiazide (HYDRODIURIL) 25 mg tablet take 1 tablet by mouth once daily 30 Tab 6    metoprolol tartrate (LOPRESSOR) 25 mg tablet take 1 tablet by mouth twice a day 180 Tab 3    ramipril (ALTACE) 10 mg capsule take 1 capsule by mouth once daily 90 Cap 3    ondansetron (ZOFRAN ODT) 4 mg disintegrating tablet dissolve 1 to 2 tablets ON TONGUE every 6 to 8 hours if needed for nausea and vomiting 10 Tab 0    atorvastatin (LIPITOR) 40 mg tablet Take 1 Tab by mouth daily. 30 Tab 6    citalopram (CELEXA) 20 mg tablet Take 0.5 Tabs by mouth daily. 15 Tab 2    topiramate (TOPAMAX) 25 mg tablet Take 50 mg by mouth nightly.  traZODone (DESYREL) 150 mg tablet Take 150 mg by mouth nightly. Review of Systems  Constitutional: The patient has no acute distress or discomfort. HEENT: The patient denies recent head trauma, eye pain, blurred vision,  hearing deficit, oropharyngeal mucosal pain or lesions, and the patient denies throat pain or discomfort. Lymphatics: The patient denies palpable peripheral lymphadenopathy. Hematologic: The patient denies having bruising, bleeding, or progressive fatigue. Respiratory: Patient denies having shortness of breath, cough, sputum production, fever, or dyspnea on exertion. Cardiovascular: The patient denies having leg pain, leg swelling, heart palpitations, chest permit, chest pain, or lightheadedness. The patient denies having dyspnea on exertion. Gastrointestinal: The patient denies having nausea, emesis, or diarrhea. The patient denies having any hematemesis or blood in the stool. Genitourinary: Patient denies having urinary urgency, frequency, or dysuria. The patient denies having blood in the urine. Psychological: The patient denies having symptoms of nervousness, anxiety, depression, or thoughts of harming himself some of this. Skin: Patient denies having skin rashes, skin, ulcerations, or unexplained itching or pruritus. Musculoskeletal: The patient denies having pain in the joints or bones.       Objective:     Visit Vitals  /78   Pulse 71   Temp 97.6 °F (36.4 °C) (Oral)   Resp 16   Ht 5' 11\" (1.803 m)   Wt 104.8 kg (231 lb)   SpO2 98%   BMI 32.22 kg/m²     ECOG PS0, pain 0/10  Physical Exam:   Gen. Appearance: The patient is in no acute distress. Skin: There is no bruise or rash. HEENT: The exam is unremarkable. Neck: Supple without lymphadenopathy or thyromegaly. Lungs: Clear to auscultation and percussion; there are no wheezes or rhonchi. Heart: Regular rate and rhythm; there are no murmurs, gallops, or rubs. Abdomen: Bowel sounds are present and normal.  There is no guarding, tenderness, or hepatosplenomegaly. Extremities: There is no clubbing, cyanosis, or edema. Neurologic: There are no focal neurologic deficits. Lymphatics: There is no palpable peripheral lymphadenopathy. Musculoskeletal: The patient has full range of motion at all joints. There is no evidence of joint deformity or effusions. There is no focal joint tenderness. Psychological/psychiatric: There is no clinical evidence of anxiety, depression, or melancholy. Lab data:      Results for orders placed or performed during the hospital encounter of 06/28/19   CBC WITH 3 PART DIFF     Status: Abnormal   Result Value Ref Range Status    WBC 8.1 4.5 - 13.0 K/uL Final    RBC 4.94 4.10 - 5.10 M/uL Final    HGB 15.8 12.0 - 16.0 g/dL Final    HCT 47.2 36 - 48 % Final    MCV 95.5 78 - 102 FL Final    MCH 32.0 25.0 - 35.0 PG Final    MCHC 33.5 31 - 37 g/dL Final    RDW 14.9 (H) 11.5 - 14.5 % Final    PLATELET 034 499 - 030 K/uL Final    NEUTROPHILS 65 40 - 70 % Final    MIXED CELLS 12 0.1 - 17 % Final    LYMPHOCYTES 23 14 - 44 % Final    ABS. NEUTROPHILS 5.3 1.8 - 9.5 K/UL Final    ABS. MIXED CELLS 1.0 0.0 - 2.3 K/uL Final    ABS. LYMPHOCYTES 1.8 1.1 - 5.9 K/UL Final     Comment: Test performed at [address] [place] [address] or Outpatient Infusion Center Location. Reviewed by Medical Director. DF AUTOMATED   Final           Assessment:     1. Erythrocytosis    2. Pulmonary embolism without acute cor pulmonale, unspecified chronicity, unspecified pulmonary embolism type ([facility] [address]        Plan:   1.  PE: Patient will continue to take the Xarelto as ordered. CMP will be obtained today. 2. Erythrocytosis/Elevated H&H: I have informed the patient that his hemoglobin today is 15.8g/dl and hematocrit is 47.2%. He denies any blurred vision or headaches. He states he smokes 11/2 pack of cigarettes a day. I have counseled him on smoking cessation. I have encouraged him to drink plenty of water and start exercising. Therapeutic Phlebotomy will be initiated whenever his hematocrit is >45%. He was also reminded of his OPIC appointments and be compliant with it. He verbalized understanding. Iron Profile and Ferritin level will be obtained at this time. I will see him back in the office for reassessment in 3 months or sooner if indicated. Orders Placed This Encounter    COMPLETE CBC & AUTO DIFF WBC    InHouse CBC ([employer])     Standing Status:   Future     Number of Occurrences:   1     Standing Expiration Date:   7/5/2019    IRON PROFILE     Standing Status:   Future     Number of Occurrences:   1     Standing Expiration Date:   6/28/2020    FERRITIN     Standing Status:   Future     Number of Occurrences:   1     Standing Expiration Date:   9/11/8138    METABOLIC PANEL, COMPREHENSIVE     Standing Status:   Future     Number of Occurrences:   1     Standing Expiration Date:   6/28/2020         Ayah Kevin NP  6/28/2019     I have assessed the patient independently and  agree with the full assessment as outlined.   Dianne Villatoro MD, Kushal Rosenberg

## 2019-06-29 LAB
A-G RATIO,AGRAT: 1.3 RATIO (ref 1.1–2.6)
ALBUMIN SERPL-MCNC: 4 G/DL (ref 3.5–5)
ALP SERPL-CCNC: 47 U/L (ref 25–115)
ALT SERPL-CCNC: 13 U/L (ref 5–40)
ANION GAP SERPL CALC-SCNC: 16 MMOL/L
AST SERPL W P-5'-P-CCNC: 16 U/L (ref 10–37)
BILIRUB SERPL-MCNC: 0.2 MG/DL (ref 0.2–1.2)
BUN SERPL-MCNC: 12 MG/DL (ref 6–22)
CALCIUM SERPL-MCNC: 9.4 MG/DL (ref 8.4–10.4)
CHLORIDE SERPL-SCNC: 99 MMOL/L (ref 98–110)
CO2 SERPL-SCNC: 21 MMOL/L (ref 20–32)
CREAT SERPL-MCNC: 0.6 MG/DL (ref 0.5–1.2)
FE % SATURATION,PSAT: 58 % (ref 20–50)
FERRITIN SERPL-MCNC: 30 NG/ML (ref 22–322)
GFRAA, 66117: >60
GFRNA, 66118: >60
GLOBULIN,GLOB: 3 G/DL (ref 2–4)
GLUCOSE SERPL-MCNC: 97 MG/DL (ref 70–99)
IRON,IRN: 214 MCG/DL (ref 45–160)
POTASSIUM SERPL-SCNC: 4.6 MMOL/L (ref 3.5–5.5)
PROT SERPL-MCNC: 7 G/DL (ref 6.4–8.3)
SODIUM SERPL-SCNC: 136 MMOL/L (ref 133–145)
TIBC,TIBC: 368 MCG/DL (ref 228–428)
UIBC SERPL-MCNC: 154 MCG/DL (ref 110–370)

## 2019-07-15 RX ORDER — RAMIPRIL 10 MG/1
CAPSULE ORAL
Qty: 30 CAP | Refills: 0 | Status: SHIPPED | OUTPATIENT
Start: 2019-07-15 | End: 2019-07-26 | Stop reason: SDUPTHER

## 2019-07-16 ENCOUNTER — HOSPITAL ENCOUNTER (OUTPATIENT)
Dept: ONCOLOGY | Age: 54
Discharge: HOME | End: 2019-07-16

## 2019-07-16 ENCOUNTER — CLINICAL SUPPORT (OUTPATIENT)
Dept: ONCOLOGY | Age: 54
End: 2019-07-16

## 2019-07-16 ENCOUNTER — HOSPITAL ENCOUNTER (OUTPATIENT)
Dept: INFUSION THERAPY | Age: 54
Discharge: HOME | End: 2019-07-16
Payer: COMMERCIAL

## 2019-07-16 VITALS
DIASTOLIC BLOOD PRESSURE: 88 MMHG | HEART RATE: 77 BPM | SYSTOLIC BLOOD PRESSURE: 130 MMHG | RESPIRATION RATE: 18 BRPM | TEMPERATURE: 97.4 F | OXYGEN SATURATION: 95 %

## 2019-07-16 DIAGNOSIS — D75.1 ERYTHROCYTOSIS: ICD-10-CM

## 2019-07-16 DIAGNOSIS — D75.1 ERYTHROCYTOSIS: Primary | ICD-10-CM

## 2019-07-16 LAB
BASO+EOS+MONOS # BLD AUTO: 1.2 K/UL (ref 0–2.3)
BASO+EOS+MONOS NFR BLD AUTO: 14 % (ref 0.1–17)
DIFFERENTIAL METHOD BLD: ABNORMAL
ERYTHROCYTE [DISTWIDTH] IN BLOOD BY AUTOMATED COUNT: 14.6 % (ref 11.5–14.5)
HCT VFR BLD AUTO: 44 % (ref 36–48)
HGB BLD-MCNC: 14.8 G/DL (ref 12–16)
LYMPHOCYTES # BLD: 1.7 K/UL (ref 1.1–5.9)
LYMPHOCYTES NFR BLD: 21 % (ref 14–44)
MCH RBC QN AUTO: 32.5 PG (ref 25–35)
MCHC RBC AUTO-ENTMCNC: 33.6 G/DL (ref 31–37)
MCV RBC AUTO: 96.7 FL (ref 78–102)
NEUTS SEG # BLD: 5.4 K/UL (ref 1.8–9.5)
NEUTS SEG NFR BLD: 65 % (ref 40–70)
PLATELET # BLD AUTO: 257 K/UL (ref 140–440)
RBC # BLD AUTO: 4.55 M/UL (ref 4.1–5.1)
WBC # BLD AUTO: 8.3 K/UL (ref 4.5–13)

## 2019-07-16 PROCEDURE — 36415 COLL VENOUS BLD VENIPUNCTURE: CPT

## 2019-07-16 NOTE — PROGRESS NOTES
[facility] Progress Note    Date: 2019    Name: Carl Lind    MRN: [id number]         : 1965     Monthly CBC for possible Therapeutic Phlebotomy      Mr. Deon Lyons was assessed and education was provided. Mr. Rizo's vitals were reviewed and patient was observed for 5 minutes prior to treatment. Visit Vitals  /88 (BP 1 Location: Right arm, BP Patient Position: Sitting)   Pulse 77   Temp 97.4 °F (36.3 °C)   Resp 18   SpO2 95%     CBC drawn from RAC w/23 gauge butterfly needle w/o difficulty. No irritation or drainage noted at site, gauze and band aid applied. Lab results were obtained and reviewed. Recent Results (from the past 12 hour(s))   CBC WITH 3 PART DIFF    Collection Time: 19  1:30 PM   Result Value Ref Range    WBC 8.3 4.5 - 13.0 K/uL    RBC 4.55 4.10 - 5.10 M/uL    HGB 14.8 12.0 - 16.0 g/dL    HCT 44.0 36 - 48 %    MCV 96.7 78 - 102 FL    MCH 32.5 25.0 - 35.0 PG    MCHC 33.6 31 - 37 g/dL    RDW 14.6 (H) 11.5 - 14.5 %    PLATELET 314 109 - 563 K/uL    NEUTROPHILS 65 40 - 70 %    MIXED CELLS 14 0.1 - 17 %    LYMPHOCYTES 21 14 - 44 %    ABS. NEUTROPHILS 5.4 1.8 - 9.5 K/UL    ABS. MIXED CELLS 1.2 0.0 - 2.3 K/uL    ABS. LYMPHOCYTES 1.7 1.1 - 5.9 K/UL    DF AUTOMATED       Therapeutic Phlebotomy HELD today for Hct 44%. Mr. Deon Lyons tolerated the infusion, and had no complaints. Patient armband removed and shredded. Mr. Deon Lyons was discharged from [place] [address] in stable condition at [phone]. He is to return on 2019 at 1300 for his next appointment for CBC and possible Therapeutic Phlebotomy.     Rios Velásquez RN  2019  2:33 PM

## 2019-07-26 ENCOUNTER — OFFICE VISIT (OUTPATIENT)
Dept: CARDIOLOGY CLINIC | Age: 54
End: 2019-07-26

## 2019-07-26 VITALS
BODY MASS INDEX: 32.9 KG/M2 | DIASTOLIC BLOOD PRESSURE: 80 MMHG | HEART RATE: 77 BPM | SYSTOLIC BLOOD PRESSURE: 140 MMHG | HEIGHT: 71 IN | WEIGHT: 235 LBS | OXYGEN SATURATION: 98 %

## 2019-07-26 DIAGNOSIS — E78.5 DYSLIPIDEMIA: ICD-10-CM

## 2019-07-26 DIAGNOSIS — I25.5 ISCHEMIC CARDIOMYOPATHY: ICD-10-CM

## 2019-07-26 DIAGNOSIS — I10 ESSENTIAL HYPERTENSION WITH GOAL BLOOD PRESSURE LESS THAN 140/90: ICD-10-CM

## 2019-07-26 DIAGNOSIS — I25.10 CORONARY ARTERY DISEASE INVOLVING NATIVE CORONARY ARTERY OF NATIVE HEART WITHOUT ANGINA PECTORIS: Primary | ICD-10-CM

## 2019-07-26 RX ORDER — RAMIPRIL 10 MG/1
CAPSULE ORAL
Qty: 90 CAP | Refills: 3 | Status: SHIPPED | OUTPATIENT
Start: 2019-07-26 | End: 2020-07-16

## 2019-07-26 RX ORDER — ROSUVASTATIN CALCIUM 10 MG/1
10 TABLET, COATED ORAL
Qty: 30 TAB | Refills: 5 | Status: SHIPPED | OUTPATIENT
Start: 2019-07-26 | End: 2020-07-31

## 2019-07-26 NOTE — PROGRESS NOTES
Marcel Sin presents today for   Chief Complaint   Patient presents with    Coronary Artery Disease     follow up overdue - no cardiac complaints     Cardiomyopathy       Marcel Sin preferred language for health care discussion is english/other. Is someone accompanying this pt? No     Is the patient using any DME equipment during OV? no    Depression Screening:  3 most recent PHQ Screens 6/28/2019   Little interest or pleasure in doing things Not at all   Feeling down, depressed, irritable, or hopeless Not at all   Total Score PHQ 2 0       Learning Assessment:  Learning Assessment 7/19/2016   PRIMARY LEARNER Patient   PRIMARY LANGUAGE ENGLISH   LEARNER PREFERENCE PRIMARY DEMONSTRATION   ANSWERED BY Patient   RELATIONSHIP SELF       Abuse Screening:  No flowsheet data found. Fall Risk  No flowsheet data found. Pt currently taking Anticoagulant therapy? Xarelto     Coordination of Care:  1. Have you been to the ER, urgent care clinic since your last visit? Hospitalized since your last visit? no    2. Have you seen or consulted any other health care providers outside of the [address] [place] [address] since your last visit? Include any pap smears or colon screening.  no

## 2019-07-26 NOTE — PROGRESS NOTES
HISTORY OF PRESENT ILLNESS  Rafaela Crook is a 62 y.o. male. Hypertension   Associated symptoms include shortness of breath. Pertinent negatives include no chest pain, no abdominal pain and no headaches. Cardiomyopathy   Associated symptoms include shortness of breath. Pertinent negatives include no chest pain, no abdominal pain and no headaches. Patient presents for an overdue follow-up office visit. He has a past medical history significant for known coronary artery disease with a prior myocardial infarction in the past to his lateral wall with stenting of his obtuse marginal branch. He also had a three-vessel bypass CABG in 2009. His long-standing hypertension, dyslipidemia, COPD with active tobacco use. Lastly, he is a history of a GIST tumor which was resected 5 years ago. The patient was hospitalized in June 2016 at [facility] for an acute bilateral pulmonary embolus. He is found to be quite hypoxic at that time. He was started on anticoagulation. He underwent an echocardiogram during his hospital stay which showed a mildly depressed LV systolic function, EF 83-01% with a mildly dilated right ventricle with mildly reduced systolic function, but no obvious pulmonary hypertension. The patient remained on Xarelto for anticoagulation since that time. Patient underwent a pharmacologic nuclear stress test in August 2016 which showed a mild to moderately depressed LV function, EF 40% with a mostly fixed but partially reversible distal posterior lateral perfusion defect likely representing an area of prior infarct with yomi-infarct ischemia. This was not significantly changed compared to his known coronary anatomy, so he has been managed medically. Patient underwent a repeat echocardiogram in November 2017 which was unchanged compared to his prior study. His ejection fraction remained in 35 to 40% range. He was last seen in our office nearly 2 years ago.   Since last visit, he continues to smoke about a pack of cigarettes a day. He has not noted any major change in his shortness of breath. No new leg swelling or claudication. No chest pain at rest or with exertion. No orthopnea PND. No major weight gain. Past Medical History:   Diagnosis Date    CAD (coronary artery disease)     Carpal tunnel syndrome     COPD     Depression     H/O echocardiogram 11/2017    EF 35-40%, mild LVH, biatrial enlargement, mild TR, RVSP 41    Hemorrhoids     History of esophagitis     History of malignant gastrointestinal stromal tumor (GIST)     Hypercholesterolemia     Hypertension     [facility]ocardial infarction [facility])     Pulmonary embolus ([facility] [address] June 2016    Bilateral    S/P CABG x 3 2009    LIMA to LAD, SVG to RPDA, SVG to diagonal    S/P cardiac catheterization November 2011    Patent GALLAGHER to LAD, patent SVG to diet, and occluded SVG to RPDA,  native LAD 85% proximal stenosis, left circumflex 20% diffuse disease, RCA distal 70% stenosis,, EF 45%    Secondary polycythemia       Current Outpatient Medications   Medication Sig Dispense Refill    ramipril (ALTACE) 10 mg capsule take 1 capsule by mouth once daily 90 Cap 3    rosuvastatin (CRESTOR) 10 mg tablet Take 1 Tab by mouth nightly. 30 Tab 5    rivaroxaban (XARELTO) 20 mg tab tablet Take 1 Tab by mouth daily (with dinner). 90 Tab 3    hydroCHLOROthiazide (HYDRODIURIL) 25 mg tablet take 1 tablet by mouth once daily 30 Tab 6    metoprolol tartrate (LOPRESSOR) 25 mg tablet take 1 tablet by mouth twice a day 180 Tab 3    ondansetron (ZOFRAN ODT) 4 mg disintegrating tablet dissolve 1 to 2 tablets ON TONGUE every 6 to 8 hours if needed for nausea and vomiting 10 Tab 0    traZODone (DESYREL) 150 mg tablet Take 150 mg by mouth nightly.          No Known Allergies     Social History     Tobacco Use    Smoking status: Current Every Day Smoker     Packs/day: 1.00    Smokeless tobacco: Never Used   Substance Use Topics    Alcohol use: Yes     Comment: \"I haven't drank in 2 months, but I was drinking about 12 pk beer per week\"     Drug use: No       No family history on file. Review of Systems   Constitutional: Negative for chills, fever and weight loss. HENT: Negative for nosebleeds. Eyes: Negative for blurred vision and double vision. Respiratory: Positive for shortness of breath. Negative for cough and wheezing. Cardiovascular: Negative for chest pain, palpitations, orthopnea, claudication, leg swelling and PND. Gastrointestinal: Negative for abdominal pain, heartburn, nausea and vomiting. Genitourinary: Negative for dysuria and hematuria. Musculoskeletal: Negative for falls and myalgias. Skin: Negative for rash. Neurological: Negative for dizziness, focal weakness and headaches. Endo/Heme/Allergies: Does not bruise/bleed easily. Psychiatric/Behavioral: Negative for substance abuse. Visit Vitals  /80   Pulse 77   Ht 5' 11\" (1.803 m)   Wt 106.6 kg (235 lb)   SpO2 98%   BMI 32.78 kg/m²      Physical Exam   Constitutional: He is oriented to person, place, and time. He appears well-developed and well-nourished. HENT:   Head: Normocephalic and atraumatic. Eyes: Conjunctivae are normal.   Neck: Neck supple. No JVD present. Carotid bruit is not present. Cardiovascular: Normal rate, regular rhythm, S1 normal, S2 normal and normal pulses. Exam reveals no gallop and no S3. No murmur heard. Pulmonary/Chest: He has decreased breath sounds. He has no wheezes. He has no rales. Abdominal: Soft. Bowel sounds are normal. There is no tenderness. Musculoskeletal: He exhibits no edema or tenderness. Neurological: He is alert and oriented to person, place, and time. Skin: Skin is warm and dry. Psychiatric: He has a normal mood and affect.  His behavior is normal.     EKG: Normal sinus rhythm,  Normal axis, inferior Q waves, consistent with prior infarct, normal QTc interval, no ST segment changes concerning for ischemia. Frequent atrial premature contractions. Compared to the previous EKG, the APCs are new. ASSESSMENT and PLAN    Coronary artery disease. Status post three-vessel CABG in 2009. LIMA to LAD, SVG to diagonal SVG to RPDA. The SVG to PDA is known to be occluded on repeat cardiac catheterization in 2011. His native RCA has a 70% distal stenosis which has been managed medically. Patient underwent a pharmacologic nuclear stress test in August 2016, which showed an ejection fraction of 40% and a partial reversible distal posterior lateral defect consistent with his known coronary anatomy. No new symptoms concerning for angina. He is no longer taking an aspirin because of his oral anticoagulation. He also stopped taking his simvastatin sometime ago due to myalgias. I recommended trying Crestor instead. Ischemic cardiomyopathy. EF 35-40% on his most recent echocardiogram in November 2017. He remains on appropriate medical therapy including a beta-blocker and ACE inhibitor. He has never required scheduled loop diuretics. Hypertension. Patient blood pressure is reasonably well-controlled on his current medical regimen. All of which I would continue. Dyslipidemia. Patient reports that he did not tolerate low-dose simvastatin due to severe myalgias. I have recommended trying rosuvastatin 10 mg daily. If he is able to tolerate this, would like to repeat his lipids and 3 months. Tobacco use disorder. Patient is currently smoking a pack of cigarettes a day which is down from his chronic 2 packs of cigarettes a day. He was encouraged to try to put smoking completely. History of bilateral pulmonary embolus. This occurred in  June 2016. Patient remains on Xarelto 20 mg daily. This is being managed by his hematologist/oncologist.      Followup in 6 months, sooner if needed.

## 2019-08-20 ENCOUNTER — HOSPITAL ENCOUNTER (OUTPATIENT)
Dept: ONCOLOGY | Age: 54
Discharge: HOME | End: 2019-08-20

## 2019-08-20 ENCOUNTER — HOSPITAL ENCOUNTER (OUTPATIENT)
Dept: INFUSION THERAPY | Age: 54
Discharge: HOME | End: 2019-08-20
Payer: COMMERCIAL

## 2019-08-20 ENCOUNTER — CLINICAL SUPPORT (OUTPATIENT)
Dept: ONCOLOGY | Age: 54
End: 2019-08-20

## 2019-08-20 VITALS
RESPIRATION RATE: 18 BRPM | OXYGEN SATURATION: 95 % | DIASTOLIC BLOOD PRESSURE: 88 MMHG | HEART RATE: 75 BPM | TEMPERATURE: 97.6 F | SYSTOLIC BLOOD PRESSURE: 132 MMHG

## 2019-08-20 DIAGNOSIS — D75.1 ERYTHROCYTOSIS: Primary | ICD-10-CM

## 2019-08-20 DIAGNOSIS — D75.1 ERYTHROCYTOSIS: ICD-10-CM

## 2019-08-20 LAB
BASO+EOS+MONOS # BLD AUTO: 1 K/UL (ref 0–2.3)
BASO+EOS+MONOS NFR BLD AUTO: 12 % (ref 0.1–17)
DIFFERENTIAL METHOD BLD: NORMAL
ERYTHROCYTE [DISTWIDTH] IN BLOOD BY AUTOMATED COUNT: 13.7 % (ref 11.5–14.5)
HCT VFR BLD AUTO: 43.7 % (ref 36–48)
HGB BLD-MCNC: 14.7 G/DL (ref 12–16)
LYMPHOCYTES # BLD: 1.8 K/UL (ref 1.1–5.9)
LYMPHOCYTES NFR BLD: 21 % (ref 14–44)
MCH RBC QN AUTO: 32 PG (ref 25–35)
MCHC RBC AUTO-ENTMCNC: 33.6 G/DL (ref 31–37)
MCV RBC AUTO: 95 FL (ref 78–102)
NEUTS SEG # BLD: 5.9 K/UL (ref 1.8–9.5)
NEUTS SEG NFR BLD: 67 % (ref 40–70)
PLATELET # BLD AUTO: 242 K/UL (ref 140–440)
RBC # BLD AUTO: 4.6 M/UL (ref 4.1–5.1)
WBC # BLD AUTO: 8.7 K/UL (ref 4.5–13)

## 2019-08-20 PROCEDURE — 36415 COLL VENOUS BLD VENIPUNCTURE: CPT

## 2019-08-20 NOTE — PROGRESS NOTES
[facility] Progress Note    Date: 2019    Name: Ryanne Mcmillan    MRN: [id number]         : 1965     Monthly CBC for possible Therapeutic Phlebotomy      Arrived to [place] [address] ambulatory at 1320. No complaints or concerns voiced    Mr. Severiano Pour was assessed and education was provided. Mr. Rizo's vitals were reviewed and patient was observed for 5 minutes prior to treatment. Visit Vitals  /88 (BP 1 Location: Left arm, BP Patient Position: Sitting)   Pulse 75   Temp 97.6 °F (36.4 °C)   Resp 18   SpO2 95%     Blood for CBC drawn from RAC w/23 gauge butterfly needle w/o difficulty. No irritation or drainage noted at site, gauze and band aid applied. Lab results were obtained and reviewed. Recent Results (from the past 12 hour(s))   CBC WITH 3 PART DIFF    Collection Time: 19  1:25 PM   Result Value Ref Range    WBC 8.7 4.5 - 13.0 K/uL    RBC 4.60 4.10 - 5.10 M/uL    HGB 14.7 12.0 - 16.0 g/dL    HCT 43.7 36 - 48 %    MCV 95.0 78 - 102 FL    MCH 32.0 25.0 - 35.0 PG    MCHC 33.6 31 - 37 g/dL    RDW 13.7 11.5 - 14.5 %    PLATELET 755 266 - 457 K/uL    NEUTROPHILS 67 40 - 70 %    MIXED CELLS 12 0.1 - 17 %    LYMPHOCYTES 21 14 - 44 %    ABS. NEUTROPHILS 5.9 1.8 - 9.5 K/UL    ABS. MIXED CELLS 1.0 0.0 - 2.3 K/uL    ABS. LYMPHOCYTES 1.8 1.1 - 5.9 K/UL    DF AUTOMATED       Therapeutic Phlebotomy HELD today for Hct 43.7. Mr. Severiano Pour tolerated the mayra-puncture, and had no complaints. Patient armband removed and shredded. Mr. Severiano Pour was discharged from [place] [address] in stable condition at 1335. He is to return on 2019 at 1300 for his next appointment of CBC and possible Therapeutic Phlebotomy.     Jacki Knight RN  2019  2:33 PM

## 2019-09-06 RX ORDER — HYDROCHLOROTHIAZIDE 25 MG/1
TABLET ORAL
Qty: 30 TAB | Refills: 6 | Status: SHIPPED | OUTPATIENT
Start: 2019-09-06 | End: 2020-09-23

## 2019-09-09 RX ORDER — HYDROCHLOROTHIAZIDE 25 MG/1
TABLET ORAL
Qty: 30 TAB | Refills: 6 | Status: SHIPPED | OUTPATIENT
Start: 2019-09-09 | End: 2020-01-27 | Stop reason: ALTCHOICE

## 2019-09-17 ENCOUNTER — HOSPITAL ENCOUNTER (OUTPATIENT)
Dept: ONCOLOGY | Age: 54
Discharge: HOME | End: 2019-09-17

## 2019-09-17 ENCOUNTER — CLINICAL SUPPORT (OUTPATIENT)
Dept: ONCOLOGY | Age: 54
End: 2019-09-17

## 2019-09-17 ENCOUNTER — HOSPITAL ENCOUNTER (OUTPATIENT)
Dept: INFUSION THERAPY | Age: 54
Discharge: HOME | End: 2019-09-17
Payer: COMMERCIAL

## 2019-09-17 VITALS
SYSTOLIC BLOOD PRESSURE: 127 MMHG | DIASTOLIC BLOOD PRESSURE: 81 MMHG | OXYGEN SATURATION: 95 % | TEMPERATURE: 97.5 F | RESPIRATION RATE: 20 BRPM | HEART RATE: 68 BPM

## 2019-09-17 DIAGNOSIS — D75.1 ERYTHROCYTOSIS: Primary | ICD-10-CM

## 2019-09-17 DIAGNOSIS — D75.1 ERYTHROCYTOSIS: ICD-10-CM

## 2019-09-17 LAB
BASO+EOS+MONOS # BLD AUTO: 0.7 K/UL (ref 0–2.3)
BASO+EOS+MONOS NFR BLD AUTO: 8 % (ref 0.1–17)
DIFFERENTIAL METHOD BLD: ABNORMAL
ERYTHROCYTE [DISTWIDTH] IN BLOOD BY AUTOMATED COUNT: 13.8 % (ref 11.5–14.5)
HCT VFR BLD AUTO: 45.2 % (ref 36–48)
HGB BLD-MCNC: 15.1 G/DL (ref 12–16)
LYMPHOCYTES # BLD: 1.8 K/UL (ref 1.1–5.9)
LYMPHOCYTES NFR BLD: 21 % (ref 14–44)
MCH RBC QN AUTO: 31.3 PG (ref 25–35)
MCHC RBC AUTO-ENTMCNC: 33.4 G/DL (ref 31–37)
MCV RBC AUTO: 93.6 FL (ref 78–102)
NEUTS SEG # BLD: 6.3 K/UL (ref 1.8–9.5)
NEUTS SEG NFR BLD: 71 % (ref 40–70)
PLATELET # BLD AUTO: 257 K/UL (ref 140–440)
RBC # BLD AUTO: 4.83 M/UL (ref 4.1–5.1)
WBC # BLD AUTO: 8.8 K/UL (ref 4.5–13)

## 2019-09-17 PROCEDURE — 36415 COLL VENOUS BLD VENIPUNCTURE: CPT

## 2019-09-17 NOTE — PROGRESS NOTES
[facility] Progress Note    Date: 2019    Name: Rishabh Birch    MRN: [id number]         : 1965      Mr. Rochelle Osgood arrived in the [facility] today at [phone], in stable condition, here for CBC/Phlebotomy (Monthly Status). He was assessed and education was provided. Mr. Rizo's vitals were reviewed. Visit Vitals  /81 (BP 1 Location: Left arm, BP Patient Position: Sitting)   Pulse 68   Temp 97.5 °F (36.4 °C)   Resp 20   SpO2 95%           Blood for a CBC was drawn from his left AC at 1335, without incident. Lab results were obtained and reviewed. Recent Results (from the past 12 hour(s))   CBC WITH 3 PART DIFF    Collection Time: 19  1:35 PM   Result Value Ref Range    WBC 8.8 4.5 - 13.0 K/uL    RBC 4.83 4.10 - 5.10 M/uL    HGB 15.1 12.0 - 16.0 g/dL    HCT 45.2 36 - 48 %    MCV 93.6 78 - 102 FL    MCH 31.3 25.0 - 35.0 PG    MCHC 33.4 31 - 37 g/dL    RDW 13.8 11.5 - 14.5 %    PLATELET 392 462 - 011 K/uL    NEUTROPHILS 71 (H) 40 - 70 %    MIXED CELLS 8 0.1 - 17 %    LYMPHOCYTES 21 14 - 44 %    ABS. NEUTROPHILS 6.3 1.8 - 9.5 K/UL    ABS. MIXED CELLS 0.7 0.0 - 2.3 K/uL    ABS. LYMPHOCYTES 1.8 1.1 - 5.9 K/UL    DF AUTOMATED             Phlebotomy was indicated today, per order, for HCT > 45.0, however, Mr. Rochelle Osgood refused the phlebotomy, and stated that he wanted to wait until his next office visit appointment with Dr. Joycelyn Kurtz, and discuss the need for a phlebotomy with him. Mr. Rochelle Osgood tolerated well, and had no complaints. Mr. Rochelle Osgood was discharged from [place] [address] in stable condition at [phone]. [facility] He is to return in 3 weeks, on Tuesday, 10-8-19, at 1100,  for his next appointment, to see Dr. Joycelyn Kurtz. And then, his [facility] appointment is scheduled at 1300 (following his office visit), for Phlebotomy, if needed.      Shaila Snell RN  2019  1:29 PM

## 2019-10-08 ENCOUNTER — OFFICE VISIT (OUTPATIENT)
Dept: ONCOLOGY | Age: 54
End: 2019-10-08

## 2019-10-08 ENCOUNTER — HOSPITAL ENCOUNTER (OUTPATIENT)
Dept: ONCOLOGY | Age: 54
Discharge: HOME | End: 2019-10-08

## 2019-10-08 VITALS
WEIGHT: 240 LBS | TEMPERATURE: 97.4 F | BODY MASS INDEX: 33.6 KG/M2 | SYSTOLIC BLOOD PRESSURE: 114 MMHG | OXYGEN SATURATION: 97 % | RESPIRATION RATE: 18 BRPM | HEIGHT: 71 IN | DIASTOLIC BLOOD PRESSURE: 76 MMHG | HEART RATE: 62 BPM

## 2019-10-08 DIAGNOSIS — I26.99 PULMONARY EMBOLISM WITHOUT ACUTE COR PULMONALE, UNSPECIFIED CHRONICITY, UNSPECIFIED PULMONARY EMBOLISM TYPE (HCC): ICD-10-CM

## 2019-10-08 DIAGNOSIS — D75.1 ERYTHROCYTOSIS: ICD-10-CM

## 2019-10-08 DIAGNOSIS — D75.1 ERYTHROCYTOSIS: Primary | ICD-10-CM

## 2019-10-08 LAB
BASO+EOS+MONOS # BLD AUTO: 1.3 K/UL (ref 0–2.3)
BASO+EOS+MONOS NFR BLD AUTO: 17 % (ref 0.1–17)
DIFFERENTIAL METHOD BLD: ABNORMAL
ERYTHROCYTE [DISTWIDTH] IN BLOOD BY AUTOMATED COUNT: 14.7 % (ref 11.5–14.5)
HCT VFR BLD AUTO: 44.5 % (ref 36–48)
HGB BLD-MCNC: 14.8 G/DL (ref 12–16)
LYMPHOCYTES # BLD: 1.3 K/UL (ref 1.1–5.9)
LYMPHOCYTES NFR BLD: 17 % (ref 14–44)
MCH RBC QN AUTO: 31.3 PG (ref 25–35)
MCHC RBC AUTO-ENTMCNC: 33.3 G/DL (ref 31–37)
MCV RBC AUTO: 94.1 FL (ref 78–102)
NEUTS SEG # BLD: 5 K/UL (ref 1.8–9.5)
NEUTS SEG NFR BLD: 66 % (ref 40–70)
PLATELET # BLD AUTO: 247 K/UL (ref 140–440)
RBC # BLD AUTO: 4.73 M/UL (ref 4.1–5.1)
WBC # BLD AUTO: 7.6 K/UL (ref 4.5–13)

## 2019-10-08 NOTE — PATIENT INSTRUCTIONS
Rivaroxaban (Xarelto, Xarelto Starter Pack) - (By mouth) Why this medicine is used:  
Treats and prevents blood clots. Contact a nurse or doctor right away if you have: 
· Sudden or severe headache · Back pain, numbness, tingling, weakness in your legs or feet · Loss of bladder or bowel control · Bloody vomit or vomit that looks like coffee grounds; bloody or black, tarry stools · Bleeding that does not stop or bruises that do not heal  
 
Common side effects: · Minor bleeding or bruising © 2017 [facility] Information is for End User's use only and may not be sold, redistributed or otherwise used for commercial purposes.

## 2019-10-08 NOTE — PROGRESS NOTES
Hematology/Oncology  Progress Note    Name: Iftikhar Palacio  Date: 10/8/2019  : 1965    RADHA Romero     Mr. Leitha Jeans is a 62y.o. year old male who was seen for Pulmonary emboli in the setting of acquired thrombophilia    Current therapy: Xarelto 20mg PO daily. Subjective:     Mr. Leitha Jeans is a 68-year-old man who was diagnosed with a pulmonary embolus in May of 2016. He states that he is taking Xarelto 20mg PO daily. He admits smoking 1 1/2 ppd and states he is willing to cut back. He denies any hospitalizations since he was seen in the clinic. He denies fatigue, shortness of breath, and dizziness. He also denies pain or any discomfort. He states he is more compliant to his follow up appointments. He does not have any other concerns or complaints to report at this time. Past medical history, family history, and social history: these were reviewed and remains unchanged.     Past Medical History:   Diagnosis Date    CAD (coronary artery disease)     Carpal tunnel syndrome     COPD     Depression     H/O echocardiogram 2017    EF 35-40%, mild LVH, biatrial enlargement, mild TR, RVSP 41    Hemorrhoids     History of esophagitis     History of malignant gastrointestinal stromal tumor (GIST)     Hypercholesterolemia     Hypertension     Myocardial infarction [facility])     Pulmonary embolus ([address] 2016    Bilateral    S/P CABG x 3     LIMA to LAD, SVG to RPDA, SVG to diagonal    S/P cardiac catheterization 2011    Patent GALLAGHER to LAD, patent SVG to diet, and occluded SVG to RPDA,  native LAD 85% proximal stenosis, left circumflex 20% diffuse disease, RCA distal 70% stenosis,, EF 45%    Secondary polycythemia      Past Surgical History:   Procedure Laterality Date    HX CORONARY STENT PLACEMENT      HX CYST REMOVAL      HX HEMORRHOIDECTOMY      HX ORTHOPAEDIC      knee    HX OTHER SURGICAL      resection of mesenteric mass     Social History Socioeconomic History    Marital status: SINGLE     Spouse name: Not on file    Number of children: Not on file    Years of education: Not on file    Highest education level: Not on file   Occupational History    Not on file   Social Needs    Financial resource strain: Not on file    Food insecurity:     Worry: Not on file     Inability: Not on file    Transportation needs:     Medical: Not on file     Non-medical: Not on file   Tobacco Use    Smoking status: Current Every Day Smoker     Packs/day: 1.00    Smokeless tobacco: Never Used   Substance and Sexual Activity    Alcohol use: Yes     Comment: \"I haven't drank in 2 months, but I was drinking about 12 pk beer per week\"     Drug use: No    Sexual activity: Not on file   Lifestyle    Physical activity:     Days per week: Not on file     Minutes per session: Not on file    Stress: Not on file   Relationships    Social connections:     Talks on phone: Not on file     Gets together: Not on file     Attends [employer] service: Not on file     Active member of club or organization: Not on file     Attends meetings of clubs or organizations: Not on file     Relationship status: Not on file    Intimate partner violence:     Fear of current or ex partner: Not on file     Emotionally abused: Not on file     Physically abused: Not on file     Forced sexual activity: Not on file   Other Topics Concern    Not on file   Social History Narrative    Not on file     No family history on file. Current Outpatient Medications   Medication Sig Dispense Refill    hydroCHLOROthiazide (HYDRODIURIL) 25 mg tablet take 1 tablet by mouth once daily 30 Tab 6    hydroCHLOROthiazide (HYDRODIURIL) 25 mg tablet take 1 tablet by mouth once daily 30 Tab 6    ramipril (ALTACE) 10 mg capsule take 1 capsule by mouth once daily 90 Cap 3    rosuvastatin (CRESTOR) 10 mg tablet Take 1 Tab by mouth nightly.  30 Tab 5    rivaroxaban (XARELTO) 20 mg tab tablet Take 1 Tab by mouth daily (with dinner). 90 Tab 3    metoprolol tartrate (LOPRESSOR) 25 mg tablet take 1 tablet by mouth twice a day 180 Tab 3    ondansetron (ZOFRAN ODT) 4 mg disintegrating tablet dissolve 1 to 2 tablets ON TONGUE every 6 to 8 hours if needed for nausea and vomiting 10 Tab 0    traZODone (DESYREL) 150 mg tablet Take 150 mg by mouth nightly. Review of Systems  Constitutional: The patient has no acute distress or discomfort. HEENT: The patient denies recent head trauma, eye pain, blurred vision,  hearing deficit, oropharyngeal mucosal pain or lesions, and the patient denies throat pain or discomfort. Lymphatics: The patient denies palpable peripheral lymphadenopathy. Hematologic: The patient denies having bruising, bleeding, or progressive fatigue. Respiratory: Patient denies having shortness of breath, cough, sputum production, fever, or dyspnea on exertion. Cardiovascular: The patient denies having leg pain, leg swelling, heart palpitations, chest permit, chest pain, or lightheadedness. The patient denies having dyspnea on exertion. Gastrointestinal: The patient denies having nausea, emesis, or diarrhea. The patient denies having any hematemesis or blood in the stool. Genitourinary: Patient denies having urinary urgency, frequency, or dysuria. The patient denies having blood in the urine. Psychological: The patient denies having symptoms of nervousness, anxiety, depression, or thoughts of harming himself some of this. Skin: Patient denies having skin rashes, skin, ulcerations, or unexplained itching or pruritus. Musculoskeletal: The patient denies having pain in the joints or bones. Objective:     Visit Vitals  /76   Pulse 62   Temp 97.4 °F (36.3 °C) (Oral)   Resp 18   Ht 5' 11\" (1.803 m)   Wt 108.9 kg (240 lb)   SpO2 97%   BMI 33.47 kg/m²     ECOG PS0, pain 0/10  Physical Exam:   Gen. Appearance: The patient is in no acute distress. Skin: There is no bruise or rash.   HEENT: The exam is unremarkable. Neck: Supple without lymphadenopathy or thyromegaly. Lungs: Clear to auscultation and percussion; there are no wheezes or rhonchi. Heart: Regular rate and rhythm; there are no murmurs, gallops, or rubs. Abdomen: Bowel sounds are present and normal.  There is no guarding, tenderness, or hepatosplenomegaly. Extremities: There is no clubbing, cyanosis, or edema. Neurologic: There are no focal neurologic deficits. Lymphatics: There is no palpable peripheral lymphadenopathy. Musculoskeletal: The patient has full range of motion at all joints. There is no evidence of joint deformity or effusions. There is no focal joint tenderness. Psychological/psychiatric: There is no clinical evidence of anxiety, depression, or melancholy. Lab data:      Results for orders placed or performed during the hospital encounter of 09/17/19   CBC WITH 3 PART DIFF     Status: Abnormal   Result Value Ref Range Status    WBC 8.8 4.5 - 13.0 K/uL Final    RBC 4.83 4.10 - 5.10 M/uL Final    HGB 15.1 12.0 - 16.0 g/dL Final    HCT 45.2 36 - 48 % Final    MCV 93.6 78 - 102 FL Final    MCH 31.3 25.0 - 35.0 PG Final    MCHC 33.4 31 - 37 g/dL Final    RDW 13.8 11.5 - 14.5 % Final    PLATELET 131 243 - 241 K/uL Final    NEUTROPHILS 71 (H) 40 - 70 % Final    MIXED CELLS 8 0.1 - 17 % Final    LYMPHOCYTES 21 14 - 44 % Final    ABS. NEUTROPHILS 6.3 1.8 - 9.5 K/UL Final    ABS. MIXED CELLS 0.7 0.0 - 2.3 K/uL Final    ABS. LYMPHOCYTES 1.8 1.1 - 5.9 K/UL Final     Comment: Test performed at [address] [place] [address] or Outpatient Infusion Center Location. Reviewed by Medical Director. DF AUTOMATED   Final           Assessment:     1. Erythrocytosis    2. Pulmonary embolism without acute cor pulmonale, unspecified chronicity, unspecified pulmonary embolism type ([facility] [address]        Plan:     Erythrocytosis/Elevated H&H: I have informed the patient that his hemoglobin today is 14.8g/dl and hematocrit is 44.5%.  He denies any blurred vision or headaches. He states he smokes 11/2 pack of cigarettes a day. I have counseled him on smoking cessation. I have encouraged him to drink plenty of water and start exercising. Therapeutic Phlebotomy will be initiated whenever his hematocrit is >45%. He was also reminded of his [facility] appointments and be compliant with it. He verbalized understanding. Iron Profile and Ferritin level will be obtained at this time. PE: Patient will continue to take the Xarelto as ordered. CMP will be obtained. I will see him back in the office for reassessment in 3 months or sooner if indicated. Orders Placed This Encounter    COMPLETE CBC & AUTO DIFF WBC    InHouse CBC ([email or web address])     Standing Status:   Future     Number of Occurrences:   1     Standing Expiration Date:   10/15/2019    IRON PROFILE     Standing Status:   Future     Standing Expiration Date:   90/1/2825    METABOLIC PANEL, COMPREHENSIVE     Standing Status:   Future     Standing Expiration Date:   10/8/2020    FERRITIN     Standing Status:   Future     Standing Expiration Date:   10/8/2020         Anna Palma NP  10/8/2019     I have assessed the patient independently and  agree with the full assessment as outlined.   Gilda Archer MD, [facility]

## 2019-10-09 LAB
A-G RATIO,AGRAT: 1.5 RATIO (ref 1.1–2.6)
ALBUMIN SERPL-MCNC: 3.9 G/DL (ref 3.5–5)
ALP SERPL-CCNC: 50 U/L (ref 25–115)
ALT SERPL-CCNC: 11 U/L (ref 5–40)
ANION GAP SERPL CALC-SCNC: 16 MMOL/L
AST SERPL W P-5'-P-CCNC: 16 U/L (ref 10–37)
BILIRUB SERPL-MCNC: 0.3 MG/DL (ref 0.2–1.2)
BUN SERPL-MCNC: 13 MG/DL (ref 6–22)
CALCIUM SERPL-MCNC: 8.9 MG/DL (ref 8.4–10.4)
CHLORIDE SERPL-SCNC: 95 MMOL/L (ref 98–110)
CO2 SERPL-SCNC: 23 MMOL/L (ref 20–32)
CREAT SERPL-MCNC: 0.6 MG/DL (ref 0.5–1.2)
FE % SATURATION,PSAT: 15 % (ref 20–50)
FERRITIN SERPL-MCNC: 29 NG/ML (ref 22–322)
GFRAA, 66117: >60
GFRNA, 66118: >60
GLOBULIN,GLOB: 2.6 G/DL (ref 2–4)
GLUCOSE SERPL-MCNC: 105 MG/DL (ref 70–99)
IRON,IRN: 51 MCG/DL (ref 45–160)
POTASSIUM SERPL-SCNC: 4.4 MMOL/L (ref 3.5–5.5)
PROT SERPL-MCNC: 6.5 G/DL (ref 6.4–8.3)
SODIUM SERPL-SCNC: 134 MMOL/L (ref 133–145)
TIBC,TIBC: 337 MCG/DL (ref 228–428)
UIBC SERPL-MCNC: 286 MCG/DL (ref 110–370)

## 2019-11-12 ENCOUNTER — APPOINTMENT (OUTPATIENT)
Dept: INFUSION THERAPY | Age: 54
End: 2019-11-12
Payer: COMMERCIAL

## 2019-11-21 ENCOUNTER — CLINICAL SUPPORT (OUTPATIENT)
Dept: ONCOLOGY | Age: 54
End: 2019-11-21

## 2019-11-21 ENCOUNTER — HOSPITAL ENCOUNTER (OUTPATIENT)
Dept: INFUSION THERAPY | Age: 54
Discharge: HOME | End: 2019-11-21
Payer: COMMERCIAL

## 2019-11-21 ENCOUNTER — HOSPITAL ENCOUNTER (OUTPATIENT)
Dept: ONCOLOGY | Age: 54
Discharge: HOME | End: 2019-11-21

## 2019-11-21 VITALS
SYSTOLIC BLOOD PRESSURE: 134 MMHG | HEART RATE: 70 BPM | OXYGEN SATURATION: 95 % | DIASTOLIC BLOOD PRESSURE: 83 MMHG | RESPIRATION RATE: 20 BRPM | TEMPERATURE: 97.1 F

## 2019-11-21 DIAGNOSIS — D75.1 ERYTHROCYTOSIS: Primary | ICD-10-CM

## 2019-11-21 DIAGNOSIS — D75.1 ERYTHROCYTOSIS: ICD-10-CM

## 2019-11-21 LAB
BASO+EOS+MONOS # BLD AUTO: 0.7 K/UL (ref 0–2.3)
BASO+EOS+MONOS NFR BLD AUTO: 9 % (ref 0.1–17)
DIFFERENTIAL METHOD BLD: ABNORMAL
ERYTHROCYTE [DISTWIDTH] IN BLOOD BY AUTOMATED COUNT: 14.6 % (ref 11.5–14.5)
HCT VFR BLD AUTO: 42.7 % (ref 36–48)
HGB BLD-MCNC: 14.2 G/DL (ref 12–16)
LYMPHOCYTES # BLD: 1.9 K/UL (ref 1.1–5.9)
LYMPHOCYTES NFR BLD: 24 % (ref 14–44)
MCH RBC QN AUTO: 31.2 PG (ref 25–35)
MCHC RBC AUTO-ENTMCNC: 33.3 G/DL (ref 31–37)
MCV RBC AUTO: 93.8 FL (ref 78–102)
NEUTS SEG # BLD: 5 K/UL (ref 1.8–9.5)
NEUTS SEG NFR BLD: 66 % (ref 40–70)
PLATELET # BLD AUTO: 287 K/UL (ref 140–440)
RBC # BLD AUTO: 4.55 M/UL (ref 4.1–5.1)
WBC # BLD AUTO: 7.6 K/UL (ref 4.5–13)

## 2019-11-21 PROCEDURE — 36415 COLL VENOUS BLD VENIPUNCTURE: CPT

## 2019-11-21 NOTE — PROGRESS NOTES
[facility] Progress Note    Date: 2019    Name: Brandon Roach    MRN: [id number]         : 1965      Mr. Mitchell Jeffries arrived in the [facility] today at [phone], in stable condition, here for CBC/Phlebotomy (Monthly Status). He was assessed and education was provided. Mr. Rizo's vitals were reviewed. Visit Vitals  /83 (BP 1 Location: Right arm, BP Patient Position: Sitting)   Pulse 70   Temp 97.1 °F (36.2 °C)   Resp 20   SpO2 95%           Blood for a CBC was drawn from his right AC at 1514, without incident. Lab results were obtained and reviewed. Recent Results (from the past 12 hour(s))   CBC WITH 3 PART DIFF    Collection Time: 19  3:14 PM   Result Value Ref Range    WBC 7.6 4.5 - 13.0 K/uL    RBC 4.55 4.10 - 5.10 M/uL    HGB 14.2 12.0 - 16.0 g/dL    HCT 42.7 36 - 48 %    MCV 93.8 78 - 102 FL    MCH 31.2 25.0 - 35.0 PG    MCHC 33.3 31 - 37 g/dL    RDW 14.6 (H) 11.5 - 14.5 %    PLATELET 641 662 - 748 K/uL    NEUTROPHILS 66 40 - 70 %    MIXED CELLS 9 0.1 - 17 %    LYMPHOCYTES 24 14 - 44 %    ABS. NEUTROPHILS 5.0 1.8 - 9.5 K/UL    ABS. MIXED CELLS 0.7 0.0 - 2.3 K/uL    ABS. LYMPHOCYTES 1.9 1.1 - 5.9 K/UL    DF AUTOMATED             Phlebotomy was HELD today per order, for HCT < 45.0. Mr. Mitchell Jeffries tolerated well, and had no complaints. Mr. Mitchell Jeffries was discharged from [place] [address] in stable condition at [phone]. .. He is to return in 1 month, on Thursday, 19, at 1600,  for his next appointment, for CBC/Phlebotomy (monthly status).     Etta Ramos RN  2019  1:29 PM

## 2019-12-19 ENCOUNTER — CLINICAL SUPPORT (OUTPATIENT)
Dept: ONCOLOGY | Age: 54
End: 2019-12-19

## 2019-12-19 ENCOUNTER — HOSPITAL ENCOUNTER (OUTPATIENT)
Dept: INFUSION THERAPY | Age: 54
Discharge: HOME | End: 2019-12-19
Payer: COMMERCIAL

## 2019-12-19 ENCOUNTER — HOSPITAL ENCOUNTER (OUTPATIENT)
Dept: ONCOLOGY | Age: 54
Discharge: HOME | End: 2019-12-19

## 2019-12-19 VITALS
DIASTOLIC BLOOD PRESSURE: 85 MMHG | SYSTOLIC BLOOD PRESSURE: 134 MMHG | RESPIRATION RATE: 20 BRPM | OXYGEN SATURATION: 95 % | TEMPERATURE: 97 F | HEART RATE: 69 BPM

## 2019-12-19 DIAGNOSIS — D75.1 ERYTHROCYTOSIS: ICD-10-CM

## 2019-12-19 DIAGNOSIS — D75.1 ERYTHROCYTOSIS: Primary | ICD-10-CM

## 2019-12-19 LAB
BASOPHILS # BLD: 0.1 K/UL (ref 0–0.1)
BASOPHILS NFR BLD: 1 % (ref 0–2)
DIFFERENTIAL METHOD BLD: ABNORMAL
EOSINOPHIL # BLD: 0.6 K/UL (ref 0–0.4)
EOSINOPHIL NFR BLD: 7 % (ref 0–5)
ERYTHROCYTE [DISTWIDTH] IN BLOOD BY AUTOMATED COUNT: 13.9 % (ref 11.6–14.5)
HCT VFR BLD AUTO: 41.7 % (ref 36–48)
HGB BLD-MCNC: 14.2 G/DL (ref 13–16)
LYMPHOCYTES # BLD: 2.1 K/UL (ref 0.9–3.6)
LYMPHOCYTES NFR BLD: 23 % (ref 21–52)
MCH RBC QN AUTO: 30.7 PG (ref 24–34)
MCHC RBC AUTO-ENTMCNC: 34.1 G/DL (ref 31–37)
MCV RBC AUTO: 90.3 FL (ref 74–97)
MONOCYTES # BLD: 0.9 K/UL (ref 0.05–1.2)
MONOCYTES NFR BLD: 9 % (ref 3–10)
NEUTS SEG # BLD: 5.4 K/UL (ref 1.8–8)
NEUTS SEG NFR BLD: 60 % (ref 40–73)
PLATELET # BLD AUTO: 288 K/UL (ref 135–420)
PMV BLD AUTO: 10.7 FL (ref 9.2–11.8)
RBC # BLD AUTO: 4.62 M/UL (ref 4.7–5.5)
WBC # BLD AUTO: 9.1 K/UL (ref 4.6–13.2)

## 2019-12-19 PROCEDURE — 85025 COMPLETE CBC W/AUTO DIFF WBC: CPT

## 2019-12-19 PROCEDURE — 36415 COLL VENOUS BLD VENIPUNCTURE: CPT

## 2019-12-19 NOTE — PROGRESS NOTES
[facility] OPI Progress Note    Date: 2019    Name: Jeancarlos Garcia    MRN: [id number]         : 1965      Mr. Jessica Sellers arrived in the [facility] today at 1600, in stable condition, here for CBC/Phlebotomy (Monthly Status). He was assessed and education was provided. Mr. Rizo's vitals were reviewed. Visit Vitals  /85 (BP 1 Location: Left arm, BP Patient Position: Sitting)   Pulse 69   Temp 97 °F (36.1 °C)   Resp 20   SpO2 95%           Blood for a CBC was drawn from his left AC at 1615, without incident. Lab results were obtained and reviewed, and the preliminary CBC results from today were as follows:    WBC 8.3  ANC 5.1  HGB 14.0  HCT 43.1                Phlebotomy was HELD today per order, for HCT < 45.0. Mr. Jessica Sellers tolerated well, and had no complaints. Mr. Jessica Sellers was discharged from [place] [address] in stable condition at [id number]. .. He is to return in 1 month, on Thursday, 20, at 1400,  for his next appointment, for CBC/Phlebotomy (monthly status).     Hiram Ludwig RN  2019  1:29 PM

## 2020-01-14 ENCOUNTER — OFFICE VISIT (OUTPATIENT)
Dept: ONCOLOGY | Age: 55
End: 2020-01-14

## 2020-01-14 ENCOUNTER — HOSPITAL ENCOUNTER (OUTPATIENT)
Dept: ONCOLOGY | Age: 55
Discharge: HOME | End: 2020-01-14

## 2020-01-14 VITALS
HEIGHT: 71 IN | HEART RATE: 68 BPM | RESPIRATION RATE: 16 BRPM | BODY MASS INDEX: 33.38 KG/M2 | OXYGEN SATURATION: 99 % | DIASTOLIC BLOOD PRESSURE: 72 MMHG | TEMPERATURE: 97.7 F | SYSTOLIC BLOOD PRESSURE: 97 MMHG | WEIGHT: 238.4 LBS

## 2020-01-14 DIAGNOSIS — I26.99 PULMONARY EMBOLISM, OTHER, UNSPECIFIED CHRONICITY, UNSPECIFIED WHETHER ACUTE COR PULMONALE PRESENT (HCC): ICD-10-CM

## 2020-01-14 DIAGNOSIS — Z71.6 ENCOUNTER FOR SMOKING CESSATION COUNSELING: ICD-10-CM

## 2020-01-14 DIAGNOSIS — D75.1 ERYTHROCYTOSIS: Primary | ICD-10-CM

## 2020-01-14 DIAGNOSIS — D75.1 ERYTHROCYTOSIS: ICD-10-CM

## 2020-01-14 DIAGNOSIS — C49.A0 GASTROINTESTINAL STROMAL TUMOR (HCC): ICD-10-CM

## 2020-01-14 DIAGNOSIS — Z72.0 TOBACCO USE: ICD-10-CM

## 2020-01-14 LAB
BASO+EOS+MONOS # BLD AUTO: 1.2 K/UL (ref 0–2.3)
BASO+EOS+MONOS NFR BLD AUTO: 15 % (ref 0.1–17)
DIFFERENTIAL METHOD BLD: NORMAL
ERYTHROCYTE [DISTWIDTH] IN BLOOD BY AUTOMATED COUNT: 14.1 % (ref 11.5–14.5)
HCT VFR BLD AUTO: 43.6 % (ref 36–48)
HGB BLD-MCNC: 14.2 G/DL (ref 12–16)
LYMPHOCYTES # BLD: 1.7 K/UL (ref 1.1–5.9)
LYMPHOCYTES NFR BLD: 21 % (ref 14–44)
MCH RBC QN AUTO: 30 PG (ref 25–35)
MCHC RBC AUTO-ENTMCNC: 32.6 G/DL (ref 31–37)
MCV RBC AUTO: 92.2 FL (ref 78–102)
NEUTS SEG # BLD: 5.2 K/UL (ref 1.8–9.5)
NEUTS SEG NFR BLD: 64 % (ref 40–70)
PLATELET # BLD AUTO: 257 K/UL (ref 140–440)
RBC # BLD AUTO: 4.73 M/UL (ref 4.1–5.1)
WBC # BLD AUTO: 8.1 K/UL (ref 4.5–13)

## 2020-01-14 NOTE — PATIENT INSTRUCTIONS
Rivaroxaban (Xarelto, Xarelto Starter Pack) - (By mouth) Why this medicine is used:  
Treats and prevents blood clots. Contact a nurse or doctor right away if you have: 
· Sudden or severe headache · Back pain, numbness, tingling, weakness in your legs or feet · Loss of bladder or bowel control · Bloody vomit or vomit that looks like coffee grounds; bloody or black, tarry stools · Bleeding that does not stop or bruises that do not heal  
 
Common side effects: · Minor bleeding or bruising © 2017 [facility] Information is for End User's use only and may not be sold, redistributed or otherwise used for commercial purposes. Learning About Benefits From Quitting Smoking How does quitting smoking make you healthier? If you're thinking about quitting smoking, you may have a few reasons to be smoke-free. Your health may be one of them. · When you quit smoking, you lower your risks for cancer, lung disease, heart attack, stroke, blood vessel disease, and blindness from macular degeneration. · When you're smoke-free, you get sick less often, and you heal faster. You are less likely to get colds, flu, bronchitis, and pneumonia. · As a nonsmoker, you may find that your mood is better and you are less stressed. When and how will you feel healthier? Quitting has real health benefits that start from day 1 of being smoke-free. And the longer you stay smoke-free, the healthier you get and the better you feel. The first hours · After just 20 minutes, your blood pressure and heart rate go down. That means there's less stress on your heart and blood vessels. · Within 12 hours, the level of carbon monoxide in your blood drops back to normal. That makes room for more oxygen. With more oxygen in your body, you may notice that you have more energy than when you smoked. After 2 weeks · Your lungs start to work better. · Your risk of heart attack starts to drop. After 1 month · When your lungs are clear, you cough less and breathe deeper, so it's easier to be active. · Your sense of taste and smell return. That means you can enjoy food more than you have since you started smoking. Over the years · After 1 year, your risk of heart disease is half what it would be if you kept smoking. · After 5 years, your risk of stroke starts to shrink. Within a few years after that, it's about the same as if you'd never smoked. · After 10 years, your risk of dying from lung cancer is cut by about half. And your risk for many other types of cancer is lower too. How would quitting help others in your life? When you quit smoking, you improve the health of everyone who now breathes in your smoke. · Their heart, lung, and cancer risks drop, much like yours. · They are sick less. For babies and small children, living smoke-free means they're less likely to have ear infections, pneumonia, and bronchitis. · If you're a woman who is or will be pregnant someday, quitting smoking means a healthier . · Children who are close to you are less likely to become adult smokers. Where can you learn more? Go to http://tahmina-guy.info/. Enter [id number] [id number] [id number] 11 in the search box to learn more about \"Learning About Benefits From Quitting Smoking. \" Current as of: 2018 Content Version: 12.2 © 8147-8203 [employer]. Care instructions adapted under license by [employer] (which disclaims liability or warranty for this information). If you have questions about a medical condition or this instruction, always ask your healthcare professional. [place] [address] any warranty or liability for your use of this information.

## 2020-01-14 NOTE — PROGRESS NOTES
Hematology/Oncology  Progress Note    Name: Rhianna Sloan  Date: 2020  : 1965    RADHA Saravia     Mr. Leonela Reid is a 62y.o. year old male who was seen for Pulmonary emboli in the setting of acquired thrombophilia    Current therapy: Xarelto 20mg PO daily. Subjective:     Mr. Leonela Reid is a 47-year-old man who was diagnosed with a pulmonary embolus in May of 2016. He states that he is taking Xarelto 20mg PO daily. He admits smoking 1 ppd from 1 1/2ppd and states he is willing to cut back some more until he totally quits. He denies any hospitalizations since he was seen in the clinic. He denies fatigue, shortness of breath, and dizziness. He also denies pain or any discomfort. He states he is more compliant to his follow up appointments. He does not have any other concerns or complaints to report at this time. Past medical history, family history, and social history: these were reviewed and remains unchanged.     Past Medical History:   Diagnosis Date    CAD (coronary artery disease)     Carpal tunnel syndrome     COPD     Depression     H/O echocardiogram 2017    EF 35-40%, mild LVH, biatrial enlargement, mild TR, RVSP 41    Hemorrhoids     History of esophagitis     History of malignant gastrointestinal stromal tumor (GIST)     Hypercholesterolemia     Hypertension     Myocardial infarction [facility])     Pulmonary embolus ([address] 2016    Bilateral    S/P CABG x 3     LIMA to LAD, SVG to RPDA, SVG to diagonal    S/P cardiac catheterization 2011    Patent GALLAGHER to LAD, patent SVG to diet, and occluded SVG to RPDA,  native LAD 85% proximal stenosis, left circumflex 20% diffuse disease, RCA distal 70% stenosis,, EF 45%    Secondary polycythemia      Past Surgical History:   Procedure Laterality Date    HX CORONARY STENT PLACEMENT      HX CYST REMOVAL      HX HEMORRHOIDECTOMY      HX ORTHOPAEDIC      knee    HX OTHER SURGICAL      resection of mesenteric mass     Social History     Socioeconomic History    Marital status: SINGLE     Spouse name: Not on file    Number of children: Not on file    Years of education: Not on file    Highest education level: Not on file   Occupational History    Not on file   Social Needs    Financial resource strain: Not on file    Food insecurity:     Worry: Not on file     Inability: Not on file    Transportation needs:     Medical: Not on file     Non-medical: Not on file   Tobacco Use    Smoking status: Current Every Day Smoker     Packs/day: 1.00    Smokeless tobacco: Never Used   Substance and Sexual Activity    Alcohol use: Yes     Comment: \"I haven't drank in 2 months, but I was drinking about 12 pk beer per week\"     Drug use: No    Sexual activity: Not on file   Lifestyle    Physical activity:     Days per week: Not on file     Minutes per session: Not on file    Stress: Not on file   Relationships    Social connections:     Talks on phone: Not on file     Gets together: Not on file     Attends Druze service: Not on file     Active member of club or organization: Not on file     Attends meetings of clubs or organizations: Not on file     Relationship status: Not on file    Intimate partner violence:     Fear of current or ex partner: Not on file     Emotionally abused: Not on file     Physically abused: Not on file     Forced sexual activity: Not on file   Other Topics Concern    Not on file   Social History Narrative    Not on file     No family history on file. Current Outpatient Medications   Medication Sig Dispense Refill    hydroCHLOROthiazide (HYDRODIURIL) 25 mg tablet take 1 tablet by mouth once daily 30 Tab 6    hydroCHLOROthiazide (HYDRODIURIL) 25 mg tablet take 1 tablet by mouth once daily 30 Tab 6    ramipril (ALTACE) 10 mg capsule take 1 capsule by mouth once daily 90 Cap 3    rosuvastatin (CRESTOR) 10 mg tablet Take 1 Tab by mouth nightly.  30 Tab 5    rivaroxaban (Elmira Mushtaq) 20 mg tab tablet Take 1 Tab by mouth daily (with dinner). 90 Tab 3    metoprolol tartrate (LOPRESSOR) 25 mg tablet take 1 tablet by mouth twice a day 180 Tab 3    ondansetron (ZOFRAN ODT) 4 mg disintegrating tablet dissolve 1 to 2 tablets ON TONGUE every 6 to 8 hours if needed for nausea and vomiting 10 Tab 0    traZODone (DESYREL) 150 mg tablet Take 150 mg by mouth nightly. Review of Systems  Constitutional: The patient has no acute distress or discomfort. HEENT: The patient denies recent head trauma, eye pain, blurred vision,  hearing deficit, oropharyngeal mucosal pain or lesions, and the patient denies throat pain or discomfort. Lymphatics: The patient denies palpable peripheral lymphadenopathy. Hematologic: The patient denies having bruising, bleeding, or progressive fatigue. Respiratory: Patient denies having shortness of breath, cough, sputum production, fever, or dyspnea on exertion. Cardiovascular: The patient denies having leg pain, leg swelling, heart palpitations, chest permit, chest pain, or lightheadedness. The patient denies having dyspnea on exertion. Gastrointestinal: The patient denies having nausea, emesis, or diarrhea. The patient denies having any hematemesis or blood in the stool. Genitourinary: Patient denies having urinary urgency, frequency, or dysuria. The patient denies having blood in the urine. Psychological: The patient denies having symptoms of nervousness, anxiety, depression, or thoughts of harming himself some of this. Skin: Patient denies having skin rashes, skin, ulcerations, or unexplained itching or pruritus. Musculoskeletal: The patient denies having pain in the joints or bones. Objective:     Visit Vitals  BP 97/72   Pulse 68   Temp 97.7 °F (36.5 °C) (Oral)   Resp 16   Ht 5' 11\" (1.803 m)   Wt 108.1 kg (238 lb 6.4 oz)   SpO2 99%   BMI 33.25 kg/m²     ECOG PS0, pain 0/10  Physical Exam:   Gen. Appearance: The patient is in no acute distress.   Skin: There is no bruise or rash. HEENT: The exam is unremarkable. Neck: Supple without lymphadenopathy or thyromegaly. Lungs: Clear to auscultation and percussion; there are no wheezes or rhonchi. Heart: Regular rate and rhythm; there are no murmurs, gallops, or rubs. Abdomen: Bowel sounds are present and normal.  There is no guarding, tenderness, or hepatosplenomegaly. Extremities: There is no clubbing, cyanosis, or edema. Neurologic: There are no focal neurologic deficits. Lymphatics: There is no palpable peripheral lymphadenopathy. Musculoskeletal: The patient has full range of motion at all joints. There is no evidence of joint deformity or effusions. There is no focal joint tenderness. Psychological/psychiatric: There is no clinical evidence of anxiety, depression, or melancholy. Lab data:      Results for orders placed or performed during the hospital encounter of 01/14/20   CBC WITH 3 PART DIFF     Status: None   Result Value Ref Range Status    WBC 8.1 4.5 - 13.0 K/uL Final    RBC 4.73 4.10 - 5.10 M/uL Final    HGB 14.2 12.0 - 16.0 g/dL Final    HCT 43.6 36 - 48 % Final    MCV 92.2 78 - 102 FL Final    MCH 30.0 25.0 - 35.0 PG Final    MCHC 32.6 31 - 37 g/dL Final    RDW 14.1 11.5 - 14.5 % Final    PLATELET 125 283 - 092 K/uL Final    NEUTROPHILS 64 40 - 70 % Final    MIXED CELLS 15 0.1 - 17 % Final    LYMPHOCYTES 21 14 - 44 % Final    ABS. NEUTROPHILS 5.2 1.8 - 9.5 K/UL Final    ABS. MIXED CELLS 1.2 0.0 - 2.3 K/uL Final    ABS. LYMPHOCYTES 1.7 1.1 - 5.9 K/UL Final     Comment: Test performed at [address] [place] [address] or Outpatient Infusion Center Location. Reviewed by Medical Director. DF AUTOMATED   Final           Assessment:     1. Erythrocytosis    2. Pulmonary embolism, other, unspecified chronicity, unspecified whether acute cor pulmonale present ([facility] [address]    3. Tobacco use    4.  Encounter for smoking cessation counseling        Plan:     Erythrocytosis/Elevated H&H: I have informed the patient that his hemoglobin today is 14.2g/dl and hematocrit is 43.6%. He denies any blurred vision or headaches. He states he smokes but has reduced it to 1 pack of cigarettes a day. I have counseled him on smoking cessation. I have encouraged him to drink plenty of water and start exercising. Therapeutic Phlebotomy will be initiated whenever his hematocrit is >45%. He was also reminded of his [facility] appointments and be compliant with it. He verbalized understanding. Iron Profile and Ferritin level will be obtained at this time. PE: Patient will continue to take the Xarelto as ordered. CMP will be obtained. Tobacco Use/Encounter for smoking cessation counseling: Patient admits smoking 1 1/2ppd. I have counseled him on smoking cessation. I gave him an education booklet on the benefits from quitting smoking. I will see him back in the office for reassessment in 3 months or sooner if indicated. Orders Placed This Encounter    COMPLETE CBC & AUTO DIFF WBC    InHouse CBC ([facility] [facility])     Standing Status:   Future     Number of Occurrences:   1     Standing Expiration Date:   6/70/7531    METABOLIC PANEL, COMPREHENSIVE     Standing Status:   Future     Number of Occurrences:   1     Standing Expiration Date:   1/14/2021    IRON PROFILE     Standing Status:   Future     Standing Expiration Date:   1/14/2021    FERRITIN     Standing Status:   Future     Standing Expiration Date:   1/14/2021         Jen Porras NP  1/14/2020       I have assessed the patient independently and  agree with the full assessment as outlined.   Alma Pacheco MD, Iona Arndt

## 2020-01-14 NOTE — PROGRESS NOTES
[facility] OPIC Progress Note    Date: 2020    Name: Sylvia Campo    MRN: [id number]         : 1965      Mr. Javed Santos had an office visit today, Tuesday, 20, with Dr. Italo Marte, and a CBC was drawn during that visit. And, the CBC results from today were as follows:      Lab results were obtained and reviewed. Recent Results (from the past 12 hour(s))   CBC WITH 3 PART DIFF    Collection Time: 20  1:56 PM   Result Value Ref Range    WBC 8.1 4.5 - 13.0 K/uL    RBC 4.73 4.10 - 5.10 M/uL    HGB 14.2 12.0 - 16.0 g/dL    HCT 43.6 36 - 48 %    MCV 92.2 78 - 102 FL    MCH 30.0 25.0 - 35.0 PG    MCHC 32.6 31 - 37 g/dL    RDW 14.1 11.5 - 14.5 %    PLATELET 476 008 - 937 K/uL    NEUTROPHILS 64 40 - 70 %    MIXED CELLS 15 0.1 - 17 %    LYMPHOCYTES 21 14 - 44 %    ABS. NEUTROPHILS 5.2 1.8 - 9.5 K/UL    ABS. MIXED CELLS 1.2 0.0 - 2.3 K/uL    ABS. LYMPHOCYTES 1.7 1.1 - 5.9 K/UL    DF AUTOMATED             Based on the above CBC results, No Phlebotomy was indicated per order, for HCT < 45.0. Therefore, his \[facility]\"" appointment that was scheduled for next Thursday, 20, was cancelled accordingly. And, he was made aware, that his next [facility] appointment is scheduled in 1 month, on Thursday, 20 at 1400, and he agreed to the new appointment.        Magan Suarez RN  2020  4:47 PM

## 2020-01-16 LAB
A-G RATIO,AGRAT: 1.3 RATIO (ref 1.1–2.6)
ALBUMIN SERPL-MCNC: 3.9 G/DL (ref 3.5–5)
ALP SERPL-CCNC: 49 U/L (ref 25–115)
ALT SERPL-CCNC: 17 U/L (ref 5–40)
ANION GAP SERPL CALC-SCNC: 11 MMOL/L
AST SERPL W P-5'-P-CCNC: 19 U/L (ref 10–37)
BILIRUB SERPL-MCNC: 0.4 MG/DL (ref 0.2–1.2)
BUN SERPL-MCNC: 12 MG/DL (ref 6–22)
CALCIUM SERPL-MCNC: 9.4 MG/DL (ref 8.4–10.5)
CHLORIDE SERPL-SCNC: 100 MMOL/L (ref 98–110)
CO2 SERPL-SCNC: 24 MMOL/L (ref 20–32)
CREAT SERPL-MCNC: 0.6 MG/DL (ref 0.5–1.2)
GFRAA, 66117: >60
GFRNA, 66118: >60
GLOBULIN,GLOB: 2.9 G/DL (ref 2–4)
GLUCOSE SERPL-MCNC: 91 MG/DL (ref 70–99)
POTASSIUM SERPL-SCNC: 4.5 MMOL/L (ref 3.5–5.5)
PROT SERPL-MCNC: 6.8 G/DL (ref 6.4–8.3)
SODIUM SERPL-SCNC: 135 MMOL/L (ref 133–145)

## 2020-01-23 ENCOUNTER — HOSPITAL ENCOUNTER (OUTPATIENT)
Dept: INFUSION THERAPY | Age: 55
Discharge: HOME | End: 2020-01-23

## 2020-01-27 ENCOUNTER — OFFICE VISIT (OUTPATIENT)
Dept: CARDIOLOGY CLINIC | Age: 55
End: 2020-01-27

## 2020-01-27 VITALS
BODY MASS INDEX: 34.16 KG/M2 | DIASTOLIC BLOOD PRESSURE: 82 MMHG | WEIGHT: 244 LBS | SYSTOLIC BLOOD PRESSURE: 140 MMHG | HEART RATE: 72 BPM | OXYGEN SATURATION: 98 % | HEIGHT: 71 IN

## 2020-01-27 DIAGNOSIS — I25.5 ISCHEMIC CARDIOMYOPATHY: ICD-10-CM

## 2020-01-27 DIAGNOSIS — I10 ESSENTIAL HYPERTENSION WITH GOAL BLOOD PRESSURE LESS THAN 140/90: ICD-10-CM

## 2020-01-27 DIAGNOSIS — Z95.1 S/P CABG X 3: ICD-10-CM

## 2020-01-27 DIAGNOSIS — E78.5 DYSLIPIDEMIA: ICD-10-CM

## 2020-01-27 DIAGNOSIS — I25.10 CORONARY ARTERY DISEASE INVOLVING NATIVE CORONARY ARTERY OF NATIVE HEART WITHOUT ANGINA PECTORIS: Primary | ICD-10-CM

## 2020-01-27 NOTE — PATIENT INSTRUCTIONS
Pharm nuc cad, hx bypass Follow up 6 months If you have not heard from the central scheduler to schedule your testing in 48 hours, please call [phone].

## 2020-01-27 NOTE — PROGRESS NOTES
Celsa Easton presents today for   Chief Complaint   Patient presents with    Coronary Artery Disease     6 month follow up     Leg Swelling     imld    Dizziness     sometimes    Shortness of Breath     exertion       Celsa Easton preferred language for health care discussion is english/other. Is someone accompanying this pt? no    Is the patient using any DME equipment during [address]? no    Depression Screening:  3 most recent PHQ Screens 1/27/2020   Little interest or pleasure in doing things Not at all   Feeling down, depressed, irritable, or hopeless Not at all   Total Score PHQ 2 0       Learning Assessment:  Learning Assessment 1/27/2020   PRIMARY LEARNER Patient   PRIMARY LANGUAGE ENGLISH   LEARNER PREFERENCE PRIMARY DEMONSTRATION   ANSWERED BY Patient   RELATIONSHIP SELF       Abuse Screening:  Abuse Screening Questionnaire 1/27/2020   Do you ever feel afraid of your partner? N   Are you in a relationship with someone who physically or mentally threatens you? N   Is it safe for you to go home? Y       Fall Risk  Fall Risk Assessment, last 12 mths 1/27/2020   Able to walk? Yes   Fall in past 12 months? No       Pt currently taking Anticoagulant therapy? no    Coordination of Care:  1. Have you been to the ER, urgent care clinic since your last visit? Hospitalized since your last visit? no    2. Have you seen or consulted any other health care providers outside of the [address] [place] [address] since your last visit? Include any pap smears or colon screening.  no

## 2020-01-27 NOTE — PROGRESS NOTES
HISTORY OF PRESENT ILLNESS  Ann Suarez is a 62 y.o. male. Leg Swelling   Associated symptoms include shortness of breath. Pertinent negatives include no chest pain, no abdominal pain and no headaches. Dizziness   Associated symptoms include shortness of breath. Pertinent negatives include no chest pain, no abdominal pain and no headaches. Shortness of Breath   Pertinent negatives include no fever, no headaches, no cough, no wheezing, no PND, no orthopnea, no chest pain, no vomiting, no abdominal pain, no rash, no leg swelling and no claudication. Patient presents for an overdue follow-up office visit. He has a past medical history significant for known coronary artery disease with a prior myocardial infarction in the past to his lateral wall with stenting of his obtuse marginal branch. He also had a three-vessel bypass CABG in 2009. His long-standing hypertension, dyslipidemia, COPD with active tobacco use. Lastly, he is a history of a GIST tumor which was resected 5 years ago. The patient was hospitalized in June 2016 at [facility] for an acute bilateral pulmonary embolus. He is found to be quite hypoxic at that time. He was started on anticoagulation. He underwent an echocardiogram during his hospital stay which showed a mildly depressed LV systolic function, EF 82-74% with a mildly dilated right ventricle with mildly reduced systolic function, but no obvious pulmonary hypertension. The patient remained on Xarelto for anticoagulation since that time. Patient underwent a pharmacologic nuclear stress test in August 2016 which showed a mild to moderately depressed LV function, EF 40% with a mostly fixed but partially reversible distal posterior lateral perfusion defect likely representing an area of prior infarct with yomi-infarct ischemia. This was not significantly changed compared to his known coronary anatomy, so he has been managed medically.     Patient underwent a repeat echocardiogram in November 2017 which was unchanged compared to his prior study. His ejection fraction remained in 35 to 40% range. The patient was last seen in the office 6 months ago. Since last visit, he admits he still does not do much physical activity. He is still smoking about a pack of cigarettes a day. He denies any new chest pain or pressure. He does have chronic shortness of breath with activity which has not changed in several years. He also notices some feet and ankle swelling at the end of the day but this is been chronic and is usually resolved by morning. He describes some dizziness when he stands up too quickly but no nicolette syncope or near syncope. This is intermittent in nature. Past Medical History:   Diagnosis Date    CAD (coronary artery disease)     Carpal tunnel syndrome     COPD     Depression     H/O echocardiogram 11/2017    EF 35-40%, mild LVH, biatrial enlargement, mild TR, RVSP 41    Hemorrhoids     History of esophagitis     History of malignant gastrointestinal stromal tumor (GIST)     Hypercholesterolemia     Hypertension     [facility]ocardial infarction [facility])     Pulmonary embolus ([address] June 2016    Bilateral    S/P CABG x 3 2009    LIMA to LAD, SVG to RPDA, SVG to diagonal    S/P cardiac catheterization November 2011    Patent GALLAGHER to LAD, patent SVG to diet, and occluded SVG to RPDA,  native LAD 85% proximal stenosis, left circumflex 20% diffuse disease, RCA distal 70% stenosis,, EF 45%    Secondary polycythemia       Current Outpatient Medications   Medication Sig Dispense Refill    hydroCHLOROthiazide (HYDRODIURIL) 25 mg tablet take 1 tablet by mouth once daily 30 Tab 6    ramipril (ALTACE) 10 mg capsule take 1 capsule by mouth once daily 90 Cap 3    rosuvastatin (CRESTOR) 10 mg tablet Take 1 Tab by mouth nightly. 30 Tab 5    rivaroxaban (XARELTO) 20 mg tab tablet Take 1 Tab by mouth daily (with dinner).  90 Tab 3    metoprolol tartrate (LOPRESSOR) 25 mg tablet take 1 tablet by mouth twice a day 180 Tab 3    traZODone (DESYREL) 150 mg tablet Take 150 mg by mouth nightly. No Known Allergies     Social History     Tobacco Use    Smoking status: Current Every Day Smoker     Packs/day: 1.00    Smokeless tobacco: Never Used   Substance Use Topics    Alcohol use: Yes     Comment: \"I haven't drank in 2 months, but I was drinking about 12 pk beer per week\"     Drug use: No       No family history on file. Review of Systems   Constitutional: Negative for chills, fever and weight loss. HENT: Negative for nosebleeds. Eyes: Negative for blurred vision and double vision. Respiratory: Positive for shortness of breath. Negative for cough and wheezing. Cardiovascular: Negative for chest pain, palpitations, orthopnea, claudication, leg swelling and PND. Gastrointestinal: Negative for abdominal pain, heartburn, nausea and vomiting. Genitourinary: Negative for dysuria and hematuria. Musculoskeletal: Negative for falls and myalgias. Skin: Negative for rash. Neurological: Positive for dizziness. Negative for focal weakness and headaches. Endo/Heme/Allergies: Does not bruise/bleed easily. Psychiatric/Behavioral: Negative for substance abuse. Visit Vitals  /82 (BP 1 Location: Left arm, BP Patient Position: Sitting)   Pulse 72   Ht 5' 11\" (1.803 m)   Wt 110.7 kg (244 lb)   SpO2 98%   BMI 34.03 kg/m²      Physical Exam   Constitutional: He is oriented to person, place, and time. He appears well-developed and well-nourished. HENT:   Head: Normocephalic and atraumatic. Eyes: Conjunctivae are normal.   Neck: Neck supple. No JVD present. Carotid bruit is not present. Cardiovascular: Normal rate, regular rhythm, S1 normal, S2 normal and normal pulses. Exam reveals no gallop and no S3. No murmur heard. Pulmonary/Chest: He has decreased breath sounds. He has no wheezes. He has no rales. Abdominal: Soft.  Bowel sounds are normal. There is no tenderness. Musculoskeletal:         General: No tenderness or edema. Neurological: He is alert and oriented to person, place, and time. Skin: Skin is warm and dry. Psychiatric: He has a normal mood and affect. His behavior is normal.     EKG: Normal sinus rhythm,  Normal axis, inferior Q waves, consistent with prior infarct, normal QTc interval, no ST segment changes concerning for ischemia. Occasional atrial premature contractions. Compared to the previous EKG, no significant change. ASSESSMENT and PLAN    Coronary artery disease. Status post three-vessel CABG in 2009. LIMA to LAD, SVG to diagonal SVG to RPDA. The SVG to PDA is known to be occluded on repeat cardiac catheterization in 2011. His native RCA has a 70% distal stenosis which has been managed medically. Patient underwent a pharmacologic nuclear stress test in August 2016, which showed an ejection fraction of 40% and a partial reversible distal posterior lateral defect consistent with his known coronary anatomy. Patient reports that he did not have typical anginal symptoms in the past.  I have recommended a repeat pharmacologic nuclear stress test for further ischemic evaluation. He should remain on his beta-blocker, potent statin and anticoagulation. He is not taking an aspirin because of long term oral anticoagulation. Ischemic cardiomyopathy. EF 35-40% on his most recent echocardiogram in November 2017. He remains on appropriate medical therapy including a beta-blocker and ACE inhibitor. He has never required scheduled loop diuretics. Hypertension. Patient blood pressure is reasonably well-controlled on his current medical regimen. All of which I would continue. Dyslipidemia. Patient reports that he did not tolerate low-dose simvastatin due to severe myalgias. The patient is tolerating Crestor 10 mg daily. I have recommended a follow-up lipid profile this will be checked by his PCP.   His LDL should be less than 70. Tobacco use disorder. Patient is currently smoking a pack of cigarettes a day which is down from his chronic 2 packs of cigarettes a day. He was encouraged to try to put smoking completely. History of bilateral pulmonary embolus. This occurred in  June 2016. Patient remains on Xarelto 20 mg daily. This is being managed by his hematologist/oncologist.      Followup in 6 months, sooner if needed.

## 2020-02-18 RX ORDER — METOPROLOL TARTRATE 25 MG/1
TABLET, FILM COATED ORAL
Qty: 180 TAB | Refills: 3 | Status: SHIPPED | OUTPATIENT
Start: 2020-02-18 | End: 2021-02-08

## 2020-02-27 ENCOUNTER — CLINICAL SUPPORT (OUTPATIENT)
Dept: ONCOLOGY | Age: 55
End: 2020-02-27

## 2020-02-27 ENCOUNTER — HOSPITAL ENCOUNTER (OUTPATIENT)
Dept: ONCOLOGY | Age: 55
Discharge: HOME | End: 2020-02-27

## 2020-02-27 ENCOUNTER — HOSPITAL ENCOUNTER (OUTPATIENT)
Dept: INFUSION THERAPY | Age: 55
Discharge: HOME | End: 2020-02-27
Payer: COMMERCIAL

## 2020-02-27 VITALS
HEART RATE: 81 BPM | TEMPERATURE: 97 F | OXYGEN SATURATION: 95 % | DIASTOLIC BLOOD PRESSURE: 71 MMHG | SYSTOLIC BLOOD PRESSURE: 113 MMHG | RESPIRATION RATE: 20 BRPM

## 2020-02-27 DIAGNOSIS — D75.1 ERYTHROCYTOSIS: Primary | ICD-10-CM

## 2020-02-27 DIAGNOSIS — D75.1 ERYTHROCYTOSIS: ICD-10-CM

## 2020-02-27 LAB
BASO+EOS+MONOS # BLD AUTO: 0.9 K/UL (ref 0–2.3)
BASO+EOS+MONOS NFR BLD AUTO: 13 % (ref 0.1–17)
DIFFERENTIAL METHOD BLD: NORMAL
ERYTHROCYTE [DISTWIDTH] IN BLOOD BY AUTOMATED COUNT: 14.4 % (ref 11.5–14.5)
HCT VFR BLD AUTO: 41.3 % (ref 36–48)
HGB BLD-MCNC: 13.2 G/DL (ref 12–16)
LYMPHOCYTES # BLD: 1.7 K/UL (ref 1.1–5.9)
LYMPHOCYTES NFR BLD: 27 % (ref 14–44)
MCH RBC QN AUTO: 28.8 PG (ref 25–35)
MCHC RBC AUTO-ENTMCNC: 32 G/DL (ref 31–37)
MCV RBC AUTO: 90 FL (ref 78–102)
NEUTS SEG # BLD: 3.9 K/UL (ref 1.8–9.5)
NEUTS SEG NFR BLD: 60 % (ref 40–70)
PLATELET # BLD AUTO: 301 K/UL (ref 140–440)
RBC # BLD AUTO: 4.59 M/UL (ref 4.1–5.1)
WBC # BLD AUTO: 6.5 K/UL (ref 4.5–13)

## 2020-02-27 PROCEDURE — 36415 COLL VENOUS BLD VENIPUNCTURE: CPT

## 2020-02-27 NOTE — PROGRESS NOTES
[facility] Progress Note    Date: 2020    Name: Andrew Suggs    MRN: [id number]         : 1965      Mr. Wesley Staton arrived in the [facility] today at 1430, in stable condition, here for CBC/Phlebotomy (Monthly Status). He was assessed and education was provided. Mr. Rizo's vitals were reviewed. Visit Vitals  /71 (BP 1 Location: Right arm, BP Patient Position: Sitting)   Pulse 81   Temp 97 °F (36.1 °C)   Resp 20   SpO2 95%           Blood for a CBC was drawn from his right AC at 1442, without incident. Lab results were obtained and reviewed, and the CBC results from today were as follows:      Recent Results (from the past 12 hour(s))   CBC WITH 3 PART DIFF    Collection Time: 20  2:42 PM   Result Value Ref Range    WBC 6.5 4.5 - 13.0 K/uL    RBC 4.59 4. 10 - 5.10 M/uL    HGB 13.2 12.0 - 16.0 g/dL    HCT 41.3 36 - 48 %    MCV 90.0 78 - 102 FL    MCH 28.8 25.0 - 35.0 PG    MCHC 32.0 31 - 37 g/dL    RDW 14.4 11.5 - 14.5 %    PLATELET 822 116 - 494 K/uL    NEUTROPHILS 60 40 - 70 %    MIXED CELLS 13 0.1 - 17 %    LYMPHOCYTES 27 14 - 44 %    ABS. NEUTROPHILS 3.9 1.8 - 9.5 K/UL    ABS. MIXED CELLS 0.9 0.0 - 2.3 K/uL    ABS. LYMPHOCYTES 1.7 1.1 - 5.9 K/UL    DF AUTOMATED                 Phlebotomy was HELD today per order, for HCT < 45.0. Mr. Wesley Staton tolerated well, and had no complaints. Mr. Wesley Staton was discharged from [place] [id number] in stable condition at 1500. ... He is to return in 1 month, on Tuesday, 3-24-20, at 1300,  for his next appointment, for CBC/Phlebotomy (monthly status).     Yaima Tejada RN  2020  1:29 PM

## 2020-03-24 ENCOUNTER — HOSPITAL ENCOUNTER (OUTPATIENT)
Dept: ONCOLOGY | Age: 55
Discharge: HOME | End: 2020-03-24

## 2020-03-24 DIAGNOSIS — D75.1 ERYTHROCYTOSIS: ICD-10-CM

## 2020-04-14 ENCOUNTER — APPOINTMENT (OUTPATIENT)
Dept: INFUSION THERAPY | Age: 55
End: 2020-04-14

## 2020-05-27 RX ORDER — RIVAROXABAN 20 MG/1
TABLET, FILM COATED ORAL
Qty: 90 TAB | Refills: 3 | Status: SHIPPED | OUTPATIENT
Start: 2020-05-27 | End: 2020-06-05 | Stop reason: SDUPTHER

## 2020-06-12 NOTE — TELEPHONE ENCOUNTER
Canceled request pt still has 2 weeks worth of medication. Told pt to call back when only 3 pills left.

## 2020-06-23 ENCOUNTER — TELEPHONE (OUTPATIENT)
Dept: ONCOLOGY | Age: 55
End: 2020-06-23

## 2020-06-23 DIAGNOSIS — I26.99 PULMONARY EMBOLISM, OTHER, UNSPECIFIED CHRONICITY, UNSPECIFIED WHETHER ACUTE COR PULMONALE PRESENT (HCC): Primary | ICD-10-CM

## 2020-06-23 NOTE — TELEPHONE ENCOUNTER
Pt needs a refill on Xarelto sent to [facility] on [address], pt has appt on July 14th

## 2020-07-14 ENCOUNTER — OFFICE VISIT (OUTPATIENT)
Dept: ONCOLOGY | Age: 55
End: 2020-07-14

## 2020-07-14 ENCOUNTER — HOSPITAL ENCOUNTER (OUTPATIENT)
Dept: ONCOLOGY | Age: 55
Discharge: HOME | End: 2020-07-14

## 2020-07-14 VITALS
BODY MASS INDEX: 33.04 KG/M2 | WEIGHT: 236 LBS | SYSTOLIC BLOOD PRESSURE: 116 MMHG | RESPIRATION RATE: 20 BRPM | HEIGHT: 71 IN | HEART RATE: 94 BPM | DIASTOLIC BLOOD PRESSURE: 79 MMHG | OXYGEN SATURATION: 96 % | TEMPERATURE: 99 F

## 2020-07-14 DIAGNOSIS — I26.99 PULMONARY EMBOLISM, OTHER, UNSPECIFIED CHRONICITY, UNSPECIFIED WHETHER ACUTE COR PULMONALE PRESENT (HCC): Primary | ICD-10-CM

## 2020-07-14 DIAGNOSIS — D75.1 ERYTHROCYTOSIS: ICD-10-CM

## 2020-07-14 DIAGNOSIS — I26.99 PULMONARY EMBOLISM, OTHER, UNSPECIFIED CHRONICITY, UNSPECIFIED WHETHER ACUTE COR PULMONALE PRESENT (HCC): ICD-10-CM

## 2020-07-14 LAB
BASO+EOS+MONOS # BLD AUTO: 1.3 K/UL (ref 0–2.3)
BASO+EOS+MONOS NFR BLD AUTO: 15 % (ref 0.1–17)
DIFFERENTIAL METHOD BLD: ABNORMAL
ERYTHROCYTE [DISTWIDTH] IN BLOOD BY AUTOMATED COUNT: 16.6 % (ref 11.5–14.5)
HCT VFR BLD AUTO: 42.4 % (ref 36–48)
HGB BLD-MCNC: 14 G/DL (ref 12–16)
LYMPHOCYTES # BLD: 1.5 K/UL (ref 1.1–5.9)
LYMPHOCYTES NFR BLD: 18 % (ref 14–44)
MCH RBC QN AUTO: 29 PG (ref 25–35)
MCHC RBC AUTO-ENTMCNC: 33 G/DL (ref 31–37)
MCV RBC AUTO: 87.8 FL (ref 78–102)
NEUTS SEG # BLD: 5.7 K/UL (ref 1.8–9.5)
NEUTS SEG NFR BLD: 67 % (ref 40–70)
PLATELET # BLD AUTO: 308 K/UL (ref 140–440)
RBC # BLD AUTO: 4.83 M/UL (ref 4.1–5.1)
WBC # BLD AUTO: 8.5 K/UL (ref 4.5–13)

## 2020-07-14 NOTE — PATIENT INSTRUCTIONS
Learning About How to Prevent Blood Clots  What is a blood clot? A blood clot is a clump of blood that forms in a blood vessel, such as a vein or an artery. If a clot gets stuck in a blood vessel, it can cause serious problems like a deep vein thrombosis (DVT) or a pulmonary embolism. A DVT is a blood clot in certain veins of the legs, pelvis, or arms. It most often occurs in the legs. Blood clots in these veins need to be treated, because they can get bigger, break loose, and travel through the bloodstream to the heart and then to the lungs. This causes a pulmonary embolism. A pulmonary embolism is a sudden blockage of an artery in the lung. Blood clots in the deep veins of the leg are the most common cause of a pulmonary embolism. In many cases, the clots are small. They may damage the lung. But if the clot is large and stops blood flow to the lung, it can be deadly. What increases your risk for blood clots? Some of the things that can increase your risk for a blood clot include:  Slowed blood flow  When blood doesn't flow normally, clots are more likely to develop. Reduced blood flow may result from long-term bed rest, such as after a surgery, injury, or serious illness. Or it may result from sitting for a long time, especially when traveling long distances. Abnormal clotting  Some people have blood that clots too easily or too quickly. Problems that may cause increased clotting include:  · Having certain blood problems that make blood clot too easily. This is a problem that may run in families. · Having certain health problems, such as cancer, heart failure, stroke, or severe infection. · Being pregnant. A woman's risk of getting blood clots increases both during pregnancy and shortly after delivery or after a  section. · Using hormonal forms of birth control or hormone therapy. · Smoking.   Injury to the blood vessel wall  Blood is more likely to clot in veins and arteries shortly after they are injured. Injury can be caused by a recent medical procedure or surgery that involved your legs, hips, belly, or brain. Or it can be caused by an injury, such as a broken hip. What can you do to prevent blood clots? After any procedure or event that increases your risk  · Take a blood-thinning medicine (called an anticoagulant) as directed if your doctor prescribes one. · Exercise  your lower leg muscles to help keep the blood moving through your legs. Point your toes up toward your head so the calves of your legs are stretched, then relax. Repeat. This is a good exercise to do when you are sitting for long periods of time. · Get up out of bed as soon as you safely can or as soon as your doctor says it's okay after an illness or surgery. If you can't get out of bed, you can do the leg exercise described above. Try to do this leg exercise every hour when you are awake. This will help keep the blood moving through your legs. If you are in the hospital and need to stay in bed, your doctor may have you use a special device that inflates and deflates knee-high boots to help keep blood from pooling in your legs. · Use compression stockings if your doctor prescribes them. These are specially fitted stockings that may prevent blood clots by keeping blood from pooling in your legs. When you travel  · Take breaks when you travel. On long car trips, stop the car and walk around every hour or so. On long bus or train rides or plane flights, get out of your seat and walk up and down the aisle every hour or so. · Do leg exercises while you are seated. For example, pump your feet up and down by pulling your toes up toward your knees and then pointing them down. If you already have a risk of blood clots, talk to your doctor before taking a long trip. Your doctor may want you to wear compression stockings or take blood-thinning medicine. Take care of your body  · Be active.  Try to get 30 minutes or more of activity on most days of the week. · Don't smoke. Smoking can increase your risk of blood clots. If you need help quitting, talk to your doctor about stop-smoking programs and medicines. · Check with your doctor about whether you should use hormonal forms of birth control or hormone therapy. These may increase your risk of blood clots. When should you call for help? [id number] anytime you think you may need emergency care. For example, call if:  · You have symptoms of a blood clot in your lung (called a pulmonary embolism). These may include:  ? Sudden chest pain. ? Trouble breathing. ? Coughing up blood. Call your doctor now or seek immediate medical care if:  · You have symptoms of a blood clot in your arm or leg (called a deep vein thrombosis). These may include:  ? Pain in the arm, calf, back of the knee, thigh, or groin. ? Redness and swelling in the arm, leg, or groin. Where can you learn more? Go to http://tahmina-guy.info/  Enter F229 in the search box to learn more about \"Learning About How to Prevent Blood Clots. \"  Current as of: March 4, 2020               Content Version: 12.5  © 1398-3671 [employer]. Care instructions adapted under license by [employer] (which disclaims liability or warranty for this information). If you have questions about a medical condition or this instruction, always ask your healthcare professional. [place] [address] any warranty or liability for your use of this information.

## 2020-07-14 NOTE — PROGRESS NOTES
Hematology/Oncology  Progress Note    Name: Lluvia Thompson  Date: 2020  : 1965    RADHA Cleary     Mr. Anne Marie Ross is a 60y.o. year old male who was seen for Pulmonary emboli in the setting of acquired thrombophilia, history of GIST,     Current therapy: Xarelto 20mg PO daily. Subjective:     Mr. Anne Marie Ross is a 49-year-old man who was diagnosed with a pulmonary embolus in May of 2016. He states that he is taking Xarelto 20mg PO daily. He admits smoking 1 ppd from 1 1/2ppd and states he is willing to cut back some more until he totally quits. He denies any hospitalizations since he was seen in the clinic. He denies fatigue, shortness of breath, and dizziness. He also denies pain or any discomfort. He does not have any other concerns or complaints to report at this time. Past medical history, family history, and social history: these were reviewed and remains unchanged.     Past Medical History:   Diagnosis Date    CAD (coronary artery disease)     Carpal tunnel syndrome     COPD     Depression     H/O echocardiogram 2017    EF 35-40%, mild LVH, biatrial enlargement, mild TR, RVSP 41    Hemorrhoids     History of esophagitis     History of malignant gastrointestinal stromal tumor (GIST)     Hypercholesterolemia     Hypertension     [facility]ocardial infarction [facility])     Pulmonary embolus ([address] 2016    Bilateral    S/P CABG x 3     LIMA to LAD, SVG to RPDA, SVG to diagonal    S/P cardiac catheterization 2011    Patent GALLAGHER to LAD, patent SVG to diet, and occluded SVG to RPDA,  native LAD 85% proximal stenosis, left circumflex 20% diffuse disease, RCA distal 70% stenosis,, EF 45%    Secondary polycythemia      Past Surgical History:   Procedure Laterality Date    HX CORONARY STENT PLACEMENT      HX CYST REMOVAL      HX HEMORRHOIDECTOMY      HX ORTHOPAEDIC      knee    HX OTHER SURGICAL      resection of mesenteric mass     Social History Socioeconomic History    Marital status: SINGLE     Spouse name: Not on file    Number of children: Not on file    Years of education: Not on file    Highest education level: Not on file   Occupational History    Not on file   Social Needs    Financial resource strain: Not on file    Food insecurity     Worry: Not on file     Inability: Not on file    Transportation needs     Medical: Not on file     Non-medical: Not on file   Tobacco Use    Smoking status: Current Every Day Smoker     Packs/day: 1.00    Smokeless tobacco: Never Used   Substance and Sexual Activity    Alcohol use: Yes     Comment: \"I haven't drank in 2 months, but I was drinking about 12 pk beer per week\"     Drug use: No    Sexual activity: Not on file   Lifestyle    Physical activity     Days per week: Not on file     Minutes per session: Not on file    Stress: Not on file   Relationships    Social connections     Talks on phone: Not on file     Gets together: Not on file     Attends Judaism service: Not on file     Active member of club or organization: Not on file     Attends meetings of clubs or organizations: Not on file     Relationship status: Not on file    Intimate partner violence     Fear of current or ex partner: Not on file     Emotionally abused: Not on file     Physically abused: Not on file     Forced sexual activity: Not on file   Other Topics Concern    Not on file   Social History Narrative    Not on file     No family history on file.   Current Outpatient Medications   Medication Sig Dispense Refill    rivaroxaban (Xarelto) 20 mg tab tablet take 1 tablet by mouth once daily WITH DINNER 90 Tab 3    metoprolol tartrate (LOPRESSOR) 25 mg tablet take 1 tablet by mouth twice a day 180 Tab 3    hydroCHLOROthiazide (HYDRODIURIL) 25 mg tablet take 1 tablet by mouth once daily 30 Tab 6    ramipril (ALTACE) 10 mg capsule take 1 capsule by mouth once daily 90 Cap 3    traZODone (DESYREL) 150 mg tablet Take 150 mg by mouth nightly.  rosuvastatin (CRESTOR) 10 mg tablet Take 1 Tab by mouth nightly. 30 Tab 5       Review of Systems  Constitutional: The patient has no acute distress or discomfort. HEENT: The patient denies recent head trauma, eye pain, blurred vision,  hearing deficit, oropharyngeal mucosal pain or lesions, and the patient denies throat pain or discomfort. Lymphatics: The patient denies palpable peripheral lymphadenopathy. Hematologic: The patient denies having bruising, bleeding, or progressive fatigue. Respiratory: Patient denies having shortness of breath, cough, sputum production, fever, or dyspnea on exertion. Cardiovascular: The patient denies having leg pain, leg swelling, heart palpitations, chest permit, chest pain, or lightheadedness. The patient denies having dyspnea on exertion. Gastrointestinal: The patient denies having nausea, emesis, or diarrhea. The patient denies having any hematemesis or blood in the stool. Genitourinary: Patient denies having urinary urgency, frequency, or dysuria. The patient denies having blood in the urine. Psychological: The patient denies having symptoms of nervousness, anxiety, depression, or thoughts of harming himself some of this. Skin: Patient denies having skin rashes, skin, ulcerations, or unexplained itching or pruritus. Musculoskeletal: The patient denies having pain in the joints or bones. Objective:     Visit Vitals  /79 (BP 1 Location: Left arm, BP Patient Position: Sitting)   Pulse 94   Temp 99 °F (37.2 °C) (Oral)   Resp 20   Ht 5' 11\" (1.803 m)   Wt 107 kg (236 lb)   SpO2 96%   BMI 32.92 kg/m²     ECOG PS0, pain 0/10  Physical Exam:   Gen. Appearance: The patient is in no acute distress. Skin: There is no bruise or rash. HEENT: The exam is unremarkable. Neck: Supple without lymphadenopathy or thyromegaly. Lungs: Clear to auscultation and percussion; there are no wheezes or rhonchi.   Heart: Regular rate and rhythm; there are no murmurs, gallops, or rubs. Abdomen: Bowel sounds are present and normal.  There is no guarding, tenderness, or hepatosplenomegaly. Extremities: There is no clubbing, cyanosis, or edema. Neurologic: There are no focal neurologic deficits. Lymphatics: There is no palpable peripheral lymphadenopathy. Musculoskeletal: The patient has full range of motion at all joints. There is no evidence of joint deformity or effusions. There is no focal joint tenderness. Psychological/psychiatric: There is no clinical evidence of anxiety, depression, or melancholy. Lab data:      Results for orders placed or performed during the hospital encounter of 07/14/20   CBC WITH 3 PART DIFF     Status: Abnormal   Result Value Ref Range Status    WBC 8.5 4.5 - 13.0 K/uL Final    RBC 4.83 4.10 - 5.10 M/uL Final    HGB 14.0 12.0 - 16 g/dL Final    HCT 42.4 36 - 48 % Final    MCV 87.8 78 - 102 FL Final    MCH 29.0 25.0 - 35.0 PG Final    MCHC 33.0 31 - 37 g/dL Final    RDW 16.6 (H) 11.5 - 14.5 % Final    PLATELET 688 720 - 170 K/uL Final    NEUTROPHILS 67 40 - 70 % Final    MIXED CELLS 15 0.1 - 17 % Final    LYMPHOCYTES 18 14 - 44 % Final    ABS. NEUTROPHILS 5.7 1.8 - 9.5 K/UL Final    ABS. MIXED CELLS 1.3 0.0 - 2.3 K/uL Final    ABS. LYMPHOCYTES 1.5 1.1 - 5.9 K/UL Final     Comment: Test performed at [address] [place] [address] or Outpatient Infusion Center Location. Reviewed by Medical Director. DF AUTOMATED   Final           Assessment:     1. Pulmonary embolism, other, unspecified chronicity, unspecified whether acute cor pulmonale present ([facility] [address]    2. Erythrocytosis        Plan:     Erythrocytosis/Elevated H&H: I have informed the patient that his hemoglobin today is 14.0g/dl and hematocrit is 42.4%. He denies any blurred vision or headaches. He states he smokes but has reduced it to 1 pack of cigarettes a day. I have counseled him on smoking cessation.  I have encouraged him to drink plenty of water and start exercising. Therapeutic Phlebotomy will be initiated whenever his hematocrit is >45%. Iron Profile and Ferritin level will be obtained at this time. PE: Patient will continue to take the Xarelto as ordered. CMP will be obtained. Tobacco Use/Encounter for smoking cessation counseling: Patient admits smoking 1 1/2ppd. I have counseled him on smoking cessation. History of GIST 2010: pt denies any N/V or abdominal issues. This will be monitored     I will see him back in the office for reassessment in 4 months or sooner if indicated.      Orders Placed This Encounter    COMPLETE CBC & AUTO DIFF WBC    InHouse CBC ([employer])     Standing Status:   Future     Number of Occurrences:   1     Standing Expiration Date:   8/01/1544    METABOLIC PANEL, COMPREHENSIVE     Standing Status:   Future     Number of Occurrences:   1     Standing Expiration Date:   7/15/2021    FERRITIN     Standing Status:   Future     Number of Occurrences:   1     Standing Expiration Date:   7/15/2021    IRON PROFILE     Standing Status:   Future     Number of Occurrences:   1     Standing Expiration Date:   7/15/2021         Es Gautam NP  7/14/2020

## 2020-07-14 NOTE — PROGRESS NOTES
Identified pt with two pt identifiers(name and ). Reviewed record in preparation for visit and have obtained necessary documentation. Chief Complaint   Patient presents with    Follow-up     Erythrocytosis        Health Maintenance Due   Topic    Hepatitis C Screening     Pneumococcal 0-64 years (1 of 1 - PPSV23)    DTaP/Tdap/Td series (1 - Tdap)    Shingrix Vaccine Age 49> (1 of 2)    Lipid Screen     FOBT Q1Y Age 54-65        Coordination of Care Questionnaire:  :   1) Have you been to an emergency room, urgent care, or hospitalized since your last visit? If yes, where when, and reason for visit? no       2. Have seen or consulted any other health care provider since your last visit? If yes, where when, and reason for visit? NO            Learning Assessment 2020   PRIMARY LEARNER Patient   PRIMARY LANGUAGE ENGLISH   LEARNER PREFERENCE PRIMARY DEMONSTRATION   ANSWERED BY Patient   RELATIONSHIP SELF        3 most recent PHQ Screens 2020   Little interest or pleasure in doing things Not at all   Feeling down, depressed, irritable, or hopeless Not at all   Total Score PHQ 2 0        Abuse Screening Questionnaire 2020   Do you ever feel afraid of your partner? N   Are you in a relationship with someone who physically or mentally threatens you? N   Is it safe for you to go home? Y        Fall Risk Assessment, last 12 mths 2020   Able to walk? Yes   Fall in past 12 months?  No

## 2020-07-16 RX ORDER — RAMIPRIL 10 MG/1
CAPSULE ORAL
Qty: 90 CAP | Refills: 3 | Status: SHIPPED | OUTPATIENT
Start: 2020-07-16 | End: 2021-07-08

## 2020-07-31 ENCOUNTER — OFFICE VISIT (OUTPATIENT)
Dept: CARDIOLOGY CLINIC | Age: 55
End: 2020-07-31

## 2020-07-31 VITALS
DIASTOLIC BLOOD PRESSURE: 78 MMHG | OXYGEN SATURATION: 97 % | BODY MASS INDEX: 32.48 KG/M2 | WEIGHT: 232 LBS | SYSTOLIC BLOOD PRESSURE: 118 MMHG | HEART RATE: 73 BPM | HEIGHT: 71 IN

## 2020-07-31 DIAGNOSIS — I25.10 CORONARY ARTERY DISEASE INVOLVING NATIVE CORONARY ARTERY OF NATIVE HEART WITHOUT ANGINA PECTORIS: Primary | ICD-10-CM

## 2020-07-31 DIAGNOSIS — E78.00 HYPERCHOLESTEROLEMIA: ICD-10-CM

## 2020-07-31 DIAGNOSIS — I26.99 OTHER PULMONARY EMBOLISM WITHOUT ACUTE COR PULMONALE, UNSPECIFIED CHRONICITY (HCC): ICD-10-CM

## 2020-07-31 DIAGNOSIS — I10 ESSENTIAL HYPERTENSION: ICD-10-CM

## 2020-07-31 DIAGNOSIS — E78.5 DYSLIPIDEMIA: ICD-10-CM

## 2020-07-31 DIAGNOSIS — I25.5 ISCHEMIC CARDIOMYOPATHY: ICD-10-CM

## 2020-07-31 NOTE — PROGRESS NOTES
HISTORY OF PRESENT ILLNESS  Amy Fragoso is a 60 y.o. male. Shortness of Breath   Pertinent negatives include no fever, no headaches, no cough, no wheezing, no PND, no orthopnea, no chest pain, no vomiting, no abdominal pain, no rash, no leg swelling and no claudication. Follow-up   Associated symptoms include shortness of breath. Pertinent negatives include no chest pain, no abdominal pain and no headaches. Patient presents for an overdue follow-up office visit. He has a past medical history significant for known coronary artery disease with a prior myocardial infarction in the past to his lateral wall with stenting of his obtuse marginal branch. He also had a three-vessel bypass CABG in 2009. His long-standing hypertension, dyslipidemia, COPD with active tobacco use. Lastly, he is a history of a GIST tumor which was resected greater than 7 years ago. The patient was hospitalized in June 2016 at [facility] for an acute bilateral pulmonary embolus. He is found to be quite hypoxic at that time. He was started on anticoagulation. He underwent an echocardiogram during his hospital stay which showed a mildly depressed LV systolic function, EF 11-45% with a mildly dilated right ventricle with mildly reduced systolic function, but no obvious pulmonary hypertension. The patient remained on Xarelto for anticoagulation since that time. Patient underwent a pharmacologic nuclear stress test in August 2016 which showed a mild to moderately depressed LV function, EF 40% with a mostly fixed but partially reversible distal posterior lateral perfusion defect likely representing an area of prior infarct with yomi-infarct ischemia. This was not significantly changed compared to his known coronary anatomy, so he has been managed medically. Patient underwent a repeat echocardiogram in November 2017 which was unchanged compared to his prior study.   His ejection fraction remained in 35 to 40% range.    The patient was last seen in the office 6 months ago. Since last visit, he admits he still does not do much physical activity. He is still smoking about a pack of cigarettes a day. He denies any new chest pain or pressure. He does have chronic shortness of breath with activity which has not changed. He was supposed to have a follow-up nuclear stress test prior to this visit but this was never completed. He states his been trying to lose weight as result has lost 12 pounds. He is also complaining of a fullness in his abdomen which he feels is related to a hernia. This has become progressively worse over the past 2 years. Past Medical History:   Diagnosis Date    CAD (coronary artery disease)     Carpal tunnel syndrome     COPD     Depression     H/O echocardiogram 11/2017    EF 35-40%, mild LVH, biatrial enlargement, mild TR, RVSP 41    Hemorrhoids     History of esophagitis     History of malignant gastrointestinal stromal tumor (GIST)     Hypercholesterolemia     Hypertension     Myocardial infarction [facility])     Pulmonary embolus ([facility] [address] June 2016    Bilateral    S/P CABG x 3 2009    LIMA to LAD, SVG to RPDA, SVG to diagonal    S/P cardiac catheterization November 2011    Patent GALLAGHER to LAD, patent SVG to diet, and occluded SVG to RPDA,  native LAD 85% proximal stenosis, left circumflex 20% diffuse disease, RCA distal 70% stenosis,, EF 45%    Secondary polycythemia       Current Outpatient Medications   Medication Sig Dispense Refill    ramipriL (ALTACE) 10 mg capsule take 1 capsule by mouth daily 90 Cap 3    rivaroxaban (Xarelto) 20 mg tab tablet take 1 tablet by mouth once daily WITH DINNER 90 Tab 3    metoprolol tartrate (LOPRESSOR) 25 mg tablet take 1 tablet by mouth twice a day 180 Tab 3    hydroCHLOROthiazide (HYDRODIURIL) 25 mg tablet take 1 tablet by mouth once daily 30 Tab 6    traZODone (DESYREL) 150 mg tablet Take 150 mg by mouth nightly.          No Known Allergies Social History     Tobacco Use    Smoking status: Current Every Day Smoker     Packs/day: 1.00    Smokeless tobacco: Never Used   Substance Use Topics    Alcohol use: Yes     Comment: \"I haven't drank in 2 months, but I was drinking about 12 pk beer per week\"     Drug use: No       No family history on file. Review of Systems   Constitutional: Negative for chills, fever and weight loss. HENT: Negative for nosebleeds. Eyes: Negative for blurred vision and double vision. Respiratory: Positive for shortness of breath. Negative for cough and wheezing. Cardiovascular: Negative for chest pain, palpitations, orthopnea, claudication, leg swelling and PND. Gastrointestinal: Negative for abdominal pain, heartburn, nausea and vomiting. Genitourinary: Negative for dysuria and hematuria. Musculoskeletal: Negative for falls and myalgias. Skin: Negative for rash. Neurological: Negative for dizziness, focal weakness and headaches. Endo/Heme/Allergies: Does not bruise/bleed easily. Psychiatric/Behavioral: Negative for substance abuse. Visit Vitals  /78 (BP 1 Location: Left arm, BP Patient Position: Sitting)   Pulse 73   Ht 5' 11\" (1.803 m)   Wt 105.2 kg (232 lb)   SpO2 97%   BMI 32.36 kg/m²      Physical Exam   Constitutional: He is oriented to person, place, and time. He appears well-developed and well-nourished. HENT:   Head: Normocephalic and atraumatic. Eyes: Conjunctivae are normal.   Neck: Neck supple. No JVD present. Carotid bruit is not present. Cardiovascular: Normal rate, regular rhythm, S1 normal, S2 normal and normal pulses. Exam reveals no gallop and no S3. No murmur heard. Pulmonary/Chest: He has decreased breath sounds. He has no wheezes. He has no rales. Abdominal: Soft. Bowel sounds are normal. He exhibits mass ( Possible hernia). He exhibits no distension. There is no abdominal tenderness. Musculoskeletal:         General: No tenderness or edema. Neurological: He is alert and oriented to person, place, and time. Skin: Skin is warm and dry. Psychiatric: He has a normal mood and affect. His behavior is normal.     EKG: Normal sinus rhythm,  Normal axis, inferior Q waves, consistent with prior infarct, normal QTc interval, no ST segment changes concerning for ischemia. Occasional atrial premature contractions. Compared to the previous EKG, no significant change. ASSESSMENT and PLAN    Coronary artery disease. Status post three-vessel CABG in 2009. LIMA to LAD, SVG to diagonal SVG to RPDA. The SVG to PDA is known to be occluded on repeat cardiac catheterization in 2011. His native RCA has a 70% distal stenosis which has been managed medically. Patient underwent a pharmacologic nuclear stress test in August 2016, which showed an ejection fraction of 40% and a partial reversible distal posterior lateral defect consistent with his known coronary anatomy. Patient reports that he did not have typical anginal symptoms in the past.  I have recommended a repeat pharmacologic nuclear stress test for further ischemic evaluation. This will be scheduled for later this fall. He should remain on his beta-blocker, potent statin and anticoagulation. He is not taking an aspirin because of long term oral anticoagulation. Ischemic cardiomyopathy. EF 35-40% on his most recent echocardiogram in November 2017. He remains on appropriate medical therapy including a beta-blocker and ACE inhibitor. He has never required scheduled loop diuretics. He does not appear to have decompensated heart failure. I will continue his current medical regimen for this. Hypertension. Patient blood pressure is reasonably well-controlled on his current medical regimen. All of which I would continue. Dyslipidemia. Patient did not tolerate simvastatin or Crestor. I recommended repeating a fasting lipid profile to see if this is significantly elevated.   If it is I would consider adding Repatha to his regimen. Tobacco use disorder. Patient is currently smoking a pack of cigarettes a day which is down from his chronic 2 packs of cigarettes a day. He was encouraged to try to put smoking completely. History of bilateral pulmonary embolus. This occurred in  June 2016. Patient remains on Xarelto 20 mg daily. This is being managed by his hematologist/oncologist.      Abdominal mass/ventral hernia. Patient reports that this has become progressively worse over the past 2 years. He does have a history of extensive abdominal surgery years ago to remove a tumor. I have referred him to a general surgeon for further evaluation. Followup in 6 months, sooner if needed.

## 2020-07-31 NOTE — PROGRESS NOTES
Andrew Suggs presents today for   Chief Complaint   Patient presents with    Follow-up     6 month follow up       Andrew Suggs preferred language for health care discussion is english/other. Is someone accompanying this pt? no    Is the patient using any DME equipment during [address]? no    Depression Screening:  3 most recent PHQ Screens 7/31/2020   Little interest or pleasure in doing things Not at all   Feeling down, depressed, irritable, or hopeless Not at all   Total Score PHQ 2 0       Learning Assessment:  Learning Assessment 1/27/2020   PRIMARY LEARNER Patient   PRIMARY LANGUAGE ENGLISH   LEARNER PREFERENCE PRIMARY DEMONSTRATION   ANSWERED BY Patient   RELATIONSHIP SELF       Abuse Screening:  Abuse Screening Questionnaire 7/31/2020   Do you ever feel afraid of your partner? N   Are you in a relationship with someone who physically or mentally threatens you? N   Is it safe for you to go home? Y       Fall Risk  Fall Risk Assessment, last 12 mths 7/31/2020   Able to walk? Yes   Fall in past 12 months? No       Pt currently taking Anticoagulant therapy? Xarelto 20 mg once a day    Coordination of Care:  1. Have you been to the ER, urgent care clinic since your last visit? Hospitalized since your last visit? no    2. Have you seen or consulted any other health care providers outside of the [address] [place] [address] since your last visit? Include any pap smears or colon screening.  NA

## 2020-07-31 NOTE — PATIENT INSTRUCTIONS
If you have not heard from the central scheduler to schedule your testing in 48 hours, please call [phone].

## 2020-09-08 ENCOUNTER — OFFICE VISIT (OUTPATIENT)
Dept: SURGERY | Age: 55
End: 2020-09-08

## 2020-09-08 VITALS
RESPIRATION RATE: 20 BRPM | BODY MASS INDEX: 31.64 KG/M2 | HEIGHT: 71 IN | SYSTOLIC BLOOD PRESSURE: 130 MMHG | DIASTOLIC BLOOD PRESSURE: 86 MMHG | WEIGHT: 226 LBS | TEMPERATURE: 97.7 F | HEART RATE: 74 BPM | OXYGEN SATURATION: 97 %

## 2020-09-08 DIAGNOSIS — Z72.0 TOBACCO ABUSE: ICD-10-CM

## 2020-09-08 DIAGNOSIS — Z71.6 TOBACCO ABUSE COUNSELING: ICD-10-CM

## 2020-09-08 DIAGNOSIS — R19.00 ABDOMINAL WALL BULGE: Primary | ICD-10-CM

## 2020-09-08 RX ORDER — IBUPROFEN 200 MG
1 TABLET ORAL EVERY 24 HOURS
Qty: 30 PATCH | Refills: 0 | Status: SHIPPED | OUTPATIENT
Start: 2020-09-08 | End: 2020-10-08

## 2020-09-08 RX ORDER — TOPIRAMATE 25 MG/1
TABLET ORAL 2 TIMES DAILY
COMMUNITY
End: 2021-02-01

## 2020-09-08 NOTE — PROGRESS NOTES
[facility]  General Surgery    Subjective:      HPI:  Pt is a very pleasant obese 31.52 kg/m2 61 yo male with a PMHx of hypertension, hypercholesterolemia, tobacco abuse with COPD, CAD s/p CABG x 3, PE, and history of gastrointestinal stromal tumor status post resection in 2013 with Dr. Dimitri decker at [facility].  The patient is referred to us by Dr. Kapil Delnaey for evaluation and management of ventral hernia. The patient states that he has had the bulge in the abdomen for several years. He also has some complaints of nausea and vomiting. He feels a knot on his left side. He smokes cigarettes and has a cough. He takes Topamax for cluster headaches.     Patient Active Problem List    Diagnosis Date Noted    Gastrointestinal stromal tumor ([address] 09/18/2013     Priority: 1 - One    Dyslipidemia 11/07/2017    Ischemic cardiomyopathy 04/17/2017    Elevated hemoglobin (HCC) 10/07/2016    Erythrocytosis 10/07/2016    Coronary artery disease involving native heart without angina pectoris 07/19/2016    Tobacco abuse 07/19/2016    Essential hypertension with goal blood pressure less than 140/90 07/19/2016    CAD (coronary artery disease)     S/P CABG x 3     Hypertension     Hypercholesterolemia     Occult blood in stools 06/03/2016    Gastroesophageal reflux disease 06/03/2016    Dysphagia, idiopathic 06/03/2016    Pulmonary emboli ([address] 05/31/2016    Pulmonary embolism ([address] 05/31/2016     Past Medical History:   Diagnosis Date    CAD (coronary artery disease)     Carpal tunnel syndrome     COPD     Depression     H/O echocardiogram 11/2017    EF 35-40%, mild LVH, biatrial enlargement, mild TR, RVSP 41    Hemorrhoids     History of esophagitis     History of malignant gastrointestinal stromal tumor (GIST)     Hypercholesterolemia     Hypertension     Myocardial infarction [facility])     Pulmonary embolus ([address] June 2016    Bilateral    S/P CABG x 3 2009    LIMA to LAD, SVG to RPDA, SVG to diagonal    S/P cardiac catheterization November 2011    Patent GALLAGHER to LAD, patent SVG to diet, and occluded SVG to RPDA,  native LAD 85% proximal stenosis, left circumflex 20% diffuse disease, RCA distal 70% stenosis,, EF 45%    Secondary polycythemia       Past Surgical History:   Procedure Laterality Date    CARDIAC SURG PROCEDURE UNLIST      stent    HX CORONARY STENT PLACEMENT      HX CYST REMOVAL      HX GI      tumor near stomach    HX HEENT      deviated septum    HX HEMORRHOIDECTOMY      HX ORTHOPAEDIC      knee injury right    HX OTHER SURGICAL      resection of mesenteric mass      No family history on file. Social History     Tobacco Use    Smoking status: Current Every Day Smoker     Packs/day: 1.50    Smokeless tobacco: Never Used   Substance Use Topics    Alcohol use: Yes     Frequency: Monthly or less     Comment: \"I haven't drank in 2 months, but I was drinking about 12 pk beer per week\"       No Known Allergies    Prior to Admission medications    Medication Sig Start Date End Date Taking? Authorizing Provider   topiramate (Topamax) 25 mg tablet Take  by mouth two (2) times a day. Yes Provider, Historical   nicotine (NICODERM CQ) 21 mg/24 hr 1 Patch by TransDERmal route every twenty-four (24) hours for 30 days. 9/8/20 10/8/20 Yes Darien Wilson MD   ramipriL (ALTACE) 10 mg capsule take 1 capsule by mouth daily 7/16/20  Yes Kya Jackson MD   rivaroxaban (Xarelto) 20 mg tab tablet take 1 tablet by mouth once daily WITH DINNER 6/5/20  Yes Tawanda Judge NP   metoprolol tartrate (LOPRESSOR) 25 mg tablet take 1 tablet by mouth twice a day 2/18/20  Yes Chica CEDEÑO NP   hydroCHLOROthiazide (HYDRODIURIL) 25 mg tablet take 1 tablet by mouth once daily 9/6/19  Yes Chica Garcia NP   traZODone (DESYREL) 150 mg tablet Take 150 mg by mouth nightly.    Yes Provider, Historical       Review of Systems:    14 systems were reviewed. The results are as above in the HPI and otherwise negative. Objective:     Vitals:    09/08/20 0943   BP: 130/86   Pulse: 74   Resp: 20   Temp: 97.7 °F (36.5 °C)   SpO2: 97%   Weight: 102.5 kg (226 lb)   Height: 5' 11\" (1.803 m)       Physical Exam:  GENERAL: alert, cooperative, no distress, appears stated age,   EYE: conjunctivae/corneas clear. PERRL, EOM's intact. THROAT & NECK: normal and no erythema or exudates noted. ,    LYMPHATIC: Cervical, supraclavicular, and axillary nodes normal. ,   LUNG: clear to auscultation bilaterally,   HEART: regular rate and rhythm, S1, S2 normal, no murmur, click, rub or gallop,   ABDOMEN: soft, obese, yellowish ecchymosis in the upper abdomen with multiple well-circumscribed nodules beneath the subcutaneous tissue of the incision in the left lower quadrant there is a 2 cm x 2 cm well-circumscribed mass. When the patient coughs there is a bulge in the upper abdomen which may be a hernia versus a diastases. It is hard to distinguish on physical exam due to the thickness of the skin in that area of the incision. It does appear from the patient's exam that there is something acute as well as chronic in the anterior abdominal wall. Bowel sounds normal. No masses,  no organomegaly,   EXTREMITIES:  extremities normal, atraumatic, no cyanosis or edema,   SKIN: Normal.,   NEUROLOGIC: AOx3. Cranial nerves 2-12 and sensation grossly intact. ,     Data Review:  to be done    Mr. Rochelle Osgood has a reminder for a \"due or due soon\" health maintenance. I have asked that he contact his primary care provider for follow-up on this health maintenance. Impression:     · Patient with abdominal wall bulge and multiple nodules in the abdominal wall of unclear etiology. Suspect incisional hernia.     Plan:     · CT abdomen pelvis  · Continue low-fat diet  · Follow-up in 2 weeks    Signed By: Lorraine Cabrera MD     September 8, 2020

## 2020-09-08 NOTE — PATIENT INSTRUCTIONS
Abdominal Hernia Repair: Before Your Surgery What is abdominal hernia repair surgery? An abdominal hernia repair is a type of surgery. It fixes a problem called a hernia. A hernia is a bulge under the skin in your belly. It happens when you have a weak spot in your belly muscles and a piece of your intestines or tissues pokes through your muscles. This can cause pain. You may notice the pain most when you lift something heavy. You can have a hernia near your belly button. Or it may be in a scar from an earlier surgery. To fix it, the doctor will do one of two kinds of surgery. In open surgery, the doctor makes one cut near the hernia. This cut is called an incision. In laparoscopic surgery, the doctor makes several very small incisions and uses a thin, lighted scope and small tools. In either type of surgery, the doctor pushes the bulge back in place, if needed. Then the doctor sews the healthy tissue back together. Often the doctor patches the weak spot with a piece of material. 
Laparoscopic surgery leaves several small scars. Open surgery leaves one long scar. The scars fade with time. You will probably need to take 1 to 2 weeks off from work. But if your job requires heavy lifting or other physical work, you may need to take 4 to 6 weeks off. Follow-up care is a key part of your treatment and safety. Be sure to make and go to all appointments, and call your doctor if you are having problems. It's also a good idea to know your test results and keep a list of the medicines you take. How do you prepare for the surgery? Surgery can be stressful. This information will help you understand what you can expect. And it will help you safely prepare for surgery. Preparing for surgery 
  · Be sure you have someone to take you home.  Anesthesia and pain medicine will make it unsafe for you to drive or get home on your own.  
  · Understand exactly what surgery is planned, along with the risks, benefits, and other options.  
  · Tell your doctor ALL the medicines, vitamins, supplements, and herbal remedies you take. Some may increase the risk of problems during your surgery. Your doctor will tell you if you should stop taking any of them before the surgery and how soon to do it.  
  · If you take aspirin or some other blood thinner, ask your doctor if you should stop taking it before your surgery. Make sure that you understand exactly what your doctor wants you to do. These medicines increase the risk of bleeding.  
  · Make sure your doctor and the hospital have a copy of your advance directive. If you don't have one, you may want to prepare one. It lets others know your health care wishes. It's a good thing to have before any type of surgery or procedure. João Fgiueredo What happens on the day of surgery? · Follow the instructions exactly about when to stop eating and drinking. If you don't, your surgery may be canceled. If your doctor told you to take your medicines on the day of surgery, take them with only a sip of water.  
  · Take a bath or shower before you come in for your surgery. Do not apply lotions, perfumes, deodorants, or nail polish.  
  · Do not shave the surgical site yourself.  
  · Take off all jewelry and piercings. And take out contact lenses, if you wear them. At the hospital or surgery center · Bring a picture ID.  
  · The area for surgery is often marked to make sure there are no errors.  
  · You will be kept comfortable and safe by your anesthesia provider. You will be asleep during the surgery.  
  · The surgery will take 30 minutes to 2 hours. It depends on how large the hernia is and where it is. When should you call your doctor? · You have questions or concerns.  
  · You don't understand how to prepare for your surgery.  
  · You become ill before the surgery (such as fever, flu, or a cold).  
  · You need to reschedule or have changed your mind about having the surgery. Where can you learn more? Go to http://tahmina-guy.info/ Enter U288 in the search box to learn more about \"Abdominal Hernia Repair: Before Your Surgery. \" Current as of: April 15, 2020               Content Version: 12.6 © 6185-3333 [employer]. Care instructions adapted under license by [employer] (which disclaims liability or warranty for this information). If you have questions about a medical condition or this instruction, always ask your healthcare professional. [address] any warranty or liability for your use of this information.

## 2020-09-08 NOTE — PROGRESS NOTES
Review of Systems   Constitutional: Positive for weight loss. Negative for chills, diaphoresis, fever and malaise/fatigue. HENT: Negative. Eyes: Negative. Respiratory: Negative. Cardiovascular: Negative. Gastrointestinal: Positive for abdominal pain, blood in stool, constipation, diarrhea and heartburn. Negative for melena, nausea and vomiting. Genitourinary: Negative. Musculoskeletal: Positive for back pain and joint pain. Negative for falls, myalgias and neck pain. Skin: Positive for itching. Negative for rash. Neurological: Negative. Endo/Heme/Allergies: Negative. Psychiatric/Behavioral: Negative for depression, hallucinations, memory loss, substance abuse and suicidal ideas. The patient has insomnia. The patient is not nervous/anxious.

## 2020-09-18 ENCOUNTER — HOSPITAL ENCOUNTER (OUTPATIENT)
Dept: CT IMAGING | Age: 55
Discharge: HOME | End: 2020-09-18
Attending: SURGERY
Payer: COMMERCIAL

## 2020-09-18 DIAGNOSIS — R19.00 ABDOMINAL WALL BULGE: ICD-10-CM

## 2020-09-18 LAB — CREAT UR-MCNC: 0.7 MG/DL (ref 0.6–1.3)

## 2020-09-18 PROCEDURE — 74177 CT ABD & PELVIS W/CONTRAST: CPT

## 2020-09-18 PROCEDURE — 74011000636 HC RX REV CODE- 636: Performed by: SURGERY

## 2020-09-18 PROCEDURE — 82565 ASSAY OF CREATININE: CPT

## 2020-09-18 RX ADMIN — IOPAMIDOL 100 ML: 612 INJECTION, SOLUTION INTRAVENOUS at 10:36

## 2020-09-22 ENCOUNTER — OFFICE VISIT (OUTPATIENT)
Dept: SURGERY | Age: 55
End: 2020-09-22
Payer: COMMERCIAL

## 2020-09-22 VITALS
DIASTOLIC BLOOD PRESSURE: 81 MMHG | SYSTOLIC BLOOD PRESSURE: 122 MMHG | OXYGEN SATURATION: 97 % | RESPIRATION RATE: 18 BRPM | HEART RATE: 63 BPM | HEIGHT: 71 IN | WEIGHT: 221 LBS | TEMPERATURE: 97.2 F | BODY MASS INDEX: 30.94 KG/M2

## 2020-09-22 DIAGNOSIS — C49.A3 GIST (GASTROINTESTINAL STROMAL TUMOR) OF SMALL BOWEL, MALIGNANT (HCC): Primary | ICD-10-CM

## 2020-09-22 PROCEDURE — 99213 OFFICE O/P EST LOW 20 MIN: CPT | Performed by: SURGERY

## 2020-09-22 NOTE — PROGRESS NOTES
Mert Ng is a 60 y.o. male  Chief Complaint   Patient presents with    Follow-up     suspected inisonal hernia          Is someone accompanying this pt? no    Is the patient using any DME equipment during OV? no        Vitals:    09/22/20 1307   BP: 122/81   Pulse: 63   Resp: 18   Temp: 97.2 °F (36.2 °C)   SpO2: 97%   Weight: 221 lb (100.2 kg)   Height: 5' 11\" (1.803 m)        Depression Screening:  3 most recent PHQ Screens 7/31/2020   Little interest or pleasure in doing things Not at all   Feeling down, depressed, irritable, or hopeless Not at all   Total Score PHQ 2 0       Learning Assessment:  Learning Assessment 1/27/2020   PRIMARY LEARNER Patient   PRIMARY LANGUAGE ENGLISH   LEARNER PREFERENCE PRIMARY DEMONSTRATION   ANSWERED BY Patient   RELATIONSHIP SELF         Fall Risk  Fall Risk Assessment, last 12 mths 7/31/2020   Able to walk? Yes   Fall in past 12 months? No       Health Maintenance reviewed and discussed and ordered per Provider. Coordination of Care:  1. Have you been to the ER, urgent care clinic since your last visit? Hospitalized since your last visit? no    2. Have you seen or consulted any other health care providers outside of the [address] [place] [address] since your last visit? Include any pap smears or colon screening.  no

## 2020-09-22 NOTE — PATIENT INSTRUCTIONS
Learning About Soft Tissue Sarcomas What is a soft tissue sarcoma? A soft tissue sarcoma is a growth of abnormal cells in the body's soft tissues. These tissues include the muscles, lymph and blood vessels, nerves, and fat. It can also include cartilage and other connective tissue. When cancer cells are found in the tissue, the tumor is called malignant. Sarcoma is another name for a malignant soft tissue tumor. Sarcomas can spread to other parts of the body, like the lungs. What are some common types? Some common types of soft tissue sarcomas include: 
Undifferentiated and unclassified sarcomas. These tumors are more common in older adults. They often appear in the arms or legs but can be found anywhere in the body. Gastrointestinal stromal tumors (GIST). This cancer forms in the gastrointestinal (GI) tract. It's usually in the stomach or small intestine. Liposarcomas. This type of soft tissue tumor is made up of fat cells. It can be found anywhere in the body. Leiomyosarcoma. These tumors are often found in the muscles in the belly and in the uterus, the stomach, and the small intestine. Other types of soft tissue tumors may appear on the skin, nerves, belly, and limbs. Examples include Michael Newer, and epithelioid sarcomas. What are the symptoms? You may feel a swelling or lump near the tumor. Or you may feel pain or have trouble breathing if the tumor grows large enough to press against nerves or organs inside your body. How are they diagnosed? Your doctor will ask you about your symptoms and past health and will examine you. If your doctor can feel a lump or mass in the soft tissue, or if you have other symptoms, you will get some tests. The tests can find out if it's cancer. They can also help your doctor figure out the best treatment for the tumor. Your doctor may also find a tumor when taking X-rays or other images for another problem. · You may have one or more imaging tests to get a better look at the tumor. These may include: ? X-rays. ? Ultrasound. ? CT scan. 
? MRI scan. 
? PET or other nuclear scan. · You may need blood tests and other lab work. · You may need a biopsy so that a sample of the tumor can be looked at under a microscope. This sample may also be used to test for biomarkers, which will help with planning treatment. · Doctors may also examine other parts of your body to make sure that the tumor hasn't spread. The doctor may talk to you about what \"stage\" your cancer is. The stage refers to how large the tumor is and how far it has spread. It also includes the tumor grade, which describes what the cancer cells look like and how likely they are to grow and spread. These can help the doctor find out what type of treatment you may need. And they may help to find a clinical trial that has treatments for your type of cancer. How are they treated? Treatment for a soft tissue sarcoma is based on the stage, type, and location of the cancer and other things, such as your overall health. The main treatments include: 
Surgery. Alena Osler probably have surgery to remove the cancer. Radiation therapy. This uses high-dose X-rays to destroy cancer cells and shrink tumors. It may be used before, during, or after surgery. Chemotherapy. These medicines kill fast-growing cells, including cancer cells and some normal cells. In some cases, other treatments may be used, such as: Targeted therapy. These medicines attack only cancer cells, not normal cells. They help keep cancer from growing or spreading. Immunotherapy. This treatment helps your immune system fight cancer. It may be given in several ways. Sometimes a clinical trial may be a good choice. Your doctor will talk with you about your options and then make a treatment plan. What are some other things to think about? · Some tumors are aggressive and need treatment right away. But most cancer grows slowly enough that you can take a little time to find out more about your cancer as you decide about treatment. · Think about getting a second opinion from another doctor. Before you start major treatment, it's a good idea to check with another doctor about the type of cancer you have and the stage of your cancer. Your doctor or insurance company can recommend someone for a second opinion. · Ask any questions you might have. You can talk to your doctor, nurses, counselors, and other advisors. · Talk to family, friends, and supporters. Get the kinds of help you need. Follow-up care is a key part of your treatment and safety. Be sure to make and go to all appointments, and call your doctor if you are having problems. It's also a good idea to know your test results and keep a list of the medicines you take. Where can you learn more? Go to http://tahmina-guy.info/ Enter V153 in the search box to learn more about \"Learning About Soft Tissue Sarcomas. \" Current as of: April 29, 2020               Content Version: 12.6 © 6740-7599 [employer]. Care instructions adapted under license by [employer] (which disclaims liability or warranty for this information). If you have questions about a medical condition or this instruction, always ask your healthcare professional. [address] any warranty or liability for your use of this information.

## 2020-09-22 NOTE — PROGRESS NOTES
[facility]  General Surgery    Name: Harrison Hobson MRN: [id number]   : 1965 Hospital: [facility]   Date: 2020 Admission Date: No admission date for patient encounter. Subjective:  Patient returns today to review the CAT scan abdomen pelvis performed to evaluate for abdominal wall hernia. The CAT scan which I did review independently on the monitor and compared to the 2016 CT abdomen pelvis reveals large intra-abdominal retroperitoneal and abdominal wall tumors concerning for recurrence of the gist tumor which she was treated for in . Patient denies any nausea or vomiting but he is having abdominal pain. He denies any unintentional weight loss. Objective:  Vitals:    20 1307   BP: 122/81   Pulse: 63   Resp: 18   Temp: 97.2 °F (36.2 °C)   SpO2: 97%   Weight: 100.2 kg (221 lb)   Height: 5' 11\" (1.803 m)       Physical Exam:    General: Awake and alert, oriented x4, no apparent distress   Abdomen: abdomen is soft without tenderness. In the upper midline of the abdomen there are multiple fleshy feeling tumors of the abdominal wall which transition to a very firm indurated incision in the lower midline. Current Medications:  Current Outpatient Medications   Medication Sig Dispense Refill    topiramate (Topamax) 25 mg tablet Take  by mouth two (2) times a day.  nicotine (NICODERM CQ) 21 mg/24 hr 1 Patch by TransDERmal route every twenty-four (24) hours for 30 days. 30 Patch 0    ramipriL (ALTACE) 10 mg capsule take 1 capsule by mouth daily 90 Cap 3    rivaroxaban (Xarelto) 20 mg tab tablet take 1 tablet by mouth once daily WITH DINNER 90 Tab 3    metoprolol tartrate (LOPRESSOR) 25 mg tablet take 1 tablet by mouth twice a day 180 Tab 3    hydroCHLOROthiazide (HYDRODIURIL) 25 mg tablet take 1 tablet by mouth once daily 30 Tab 6    traZODone (DESYREL) 150 mg tablet Take 150 mg by mouth nightly. Chart and notes reviewed.  Data reviewed. I have evaluated and examined the patient. IMPRESSION:   · Patient with history of gastrointestinal stromal tumor managed at [facility] approximately 2013 who now appears to have recurrence of the abdominal cavity retroperitoneum and abdominal wall. PLAN:/DISCUSION:   · I spoke with the patient's medical oncologist Dr. Yanely Foster who will have the patient come in to see him next week.   · Patient may need to follow-up with me for biopsy of these tumors        Liseth Crowe MD

## 2020-09-23 RX ORDER — HYDROCHLOROTHIAZIDE 25 MG/1
TABLET ORAL
Qty: 30 TAB | Refills: 6 | Status: SHIPPED | OUTPATIENT
Start: 2020-09-23 | End: 2021-04-26

## 2020-09-30 ENCOUNTER — OFFICE VISIT (OUTPATIENT)
Dept: ONCOLOGY | Age: 55
End: 2020-09-30
Payer: COMMERCIAL

## 2020-09-30 VITALS
SYSTOLIC BLOOD PRESSURE: 116 MMHG | DIASTOLIC BLOOD PRESSURE: 81 MMHG | HEART RATE: 66 BPM | RESPIRATION RATE: 18 BRPM | OXYGEN SATURATION: 96 % | BODY MASS INDEX: 30.94 KG/M2 | HEIGHT: 71 IN | WEIGHT: 221 LBS

## 2020-09-30 DIAGNOSIS — I26.99 PULMONARY EMBOLISM, OTHER, UNSPECIFIED CHRONICITY, UNSPECIFIED WHETHER ACUTE COR PULMONALE PRESENT (HCC): ICD-10-CM

## 2020-09-30 DIAGNOSIS — C49.A0 GASTROINTESTINAL STROMAL TUMOR (HCC): ICD-10-CM

## 2020-09-30 DIAGNOSIS — R19.00 ABDOMINAL MASS, UNSPECIFIED ABDOMINAL LOCATION: Primary | ICD-10-CM

## 2020-09-30 DIAGNOSIS — D75.1 POLYCYTHEMIA: ICD-10-CM

## 2020-09-30 PROCEDURE — 99214 OFFICE O/P EST MOD 30 MIN: CPT | Performed by: INTERNAL MEDICINE

## 2020-09-30 NOTE — PROGRESS NOTES
Hematology/Oncology  Progress Note    Name: Stephanie Yao  Date: 2020  : 1965    RADHA Pickard     Mr. Jaimee Sandra is a 60y.o. year old male who was seen for Pulmonary emboli in the setting of acquired thrombophilia and history of GIST,     Current therapy: Xarelto 20mg PO daily. Subjective:     Mr. Jaimee Sandra is a 53-year-old man who was diagnosed with a pulmonary embolus in May of 2016. Patient was being followed by Dr. Dwight Muñoz who retired recently. He states that he is taking Xarelto 20mg PO daily. He admits smoking 1 ppd from 1 2ppd and states he is willing to cut back some more until he totally quits. He went to Surgery recently for evaluation of abdominal wall hernia. His recent CT abdomen pelvis revealed a large intra-abdominal retroperitoneal and abdominal wall tumors concerning for recurrence of the gist tumor which he was treated for in . He reported that abdominal mass has increased in size for past few months. Denied significant pain or vomiting or altered bowel movements. He is an active smoker. He did not receive his phlebotomy for months for his polycythemia. The patient otherwise has no other complaints. Denied fever, chills, night sweat, unintentional weight loss, skin lumps or bumps, acute bleeding or bruising issues. No acute bleeding, blood in stool, dark stool, melena, hematochezia, hemoptysis, dark urine, or easily bruising. Denied headache, acute vision change, dizziness, chest pain, worsen shortness of breath, palpitation, productive cough, nausea, vomiting, abdominal pain, altered bowel habits, dysuria, new bone pain or back pain, focal numbness or weakness. Past medical history, family history, and social history: these were reviewed and remains unchanged.     Past Medical History:   Diagnosis Date    CAD (coronary artery disease)     Carpal tunnel syndrome     COPD     Depression     H/O echocardiogram 2017    EF 35-40%, mild LVH, biatrial enlargement, mild TR, RVSP 41    Hemorrhoids     History of esophagitis     History of malignant gastrointestinal stromal tumor (GIST)     Hypercholesterolemia     Hypertension     Myocardial infarction [facility])     Pulmonary embolus [facility]) June 2016    Bilateral    S/P CABG x 3 2009    LIMA to LAD, SVG to RPDA, SVG to diagonal    S/P cardiac catheterization November 2011    Patent GALLAGHER to LAD, patent SVG to diet, and occluded SVG to RPDA,  native LAD 85% proximal stenosis, left circumflex 20% diffuse disease, RCA distal 70% stenosis,, EF 45%    Secondary polycythemia      Past Surgical History:   Procedure Laterality Date    CARDIAC SURG PROCEDURE UNLIST      stent    HX CORONARY STENT PLACEMENT      HX CYST REMOVAL      HX GI      tumor near stomach    HX HEENT      deviated septum    HX HEMORRHOIDECTOMY      HX ORTHOPAEDIC      knee injury right    HX OTHER SURGICAL      resection of mesenteric mass     Social History     Socioeconomic History    Marital status: SINGLE     Spouse name: Not on file    Number of children: Not on file    Years of education: Not on file    Highest education level: Not on file   Occupational History    Not on file   Social Needs    Financial resource strain: Not on file    Food insecurity     Worry: Not on file     Inability: Not on file    Transportation needs     Medical: Not on file     Non-medical: Not on file   Tobacco Use    Smoking status: Current Every Day Smoker     Packs/day: 1.50    Smokeless tobacco: Never Used   Substance and Sexual Activity    Alcohol use: Yes     Frequency: Monthly or less     Comment: \"I haven't drank in 2 months, but I was drinking about 12 pk beer per week\"     Drug use: No    Sexual activity: Not on file   Lifestyle    Physical activity     Days per week: Not on file     Minutes per session: Not on file    Stress: Not on file   Relationships    Social connections     Talks on phone: Not on file     Gets together: Not on file     Attends Shinto service: Not on file     Active member of club or organization: Not on file     Attends meetings of clubs or organizations: Not on file     Relationship status: Not on file    Intimate partner violence     Fear of current or ex partner: Not on file     Emotionally abused: Not on file     Physically abused: Not on file     Forced sexual activity: Not on file   Other Topics Concern    Not on file   Social History Narrative    Not on file     History reviewed. No pertinent family history. Current Outpatient Medications   Medication Sig Dispense Refill    hydroCHLOROthiazide (HYDRODIURIL) 25 mg tablet take 1 tablet by mouth once daily 30 Tab 6    topiramate (Topamax) 25 mg tablet Take  by mouth two (2) times a day.  nicotine (NICODERM CQ) 21 mg/24 hr 1 Patch by TransDERmal route every twenty-four (24) hours for 30 days. 30 Patch 0    ramipriL (ALTACE) 10 mg capsule take 1 capsule by mouth daily 90 Cap 3    rivaroxaban (Xarelto) 20 mg tab tablet take 1 tablet by mouth once daily WITH DINNER 90 Tab 3    metoprolol tartrate (LOPRESSOR) 25 mg tablet take 1 tablet by mouth twice a day 180 Tab 3    traZODone (DESYREL) 150 mg tablet Take 150 mg by mouth nightly. Review of Systems   Constitutional: Negative for chills, diaphoresis, fever, malaise/fatigue and weight loss. Respiratory: Negative for cough, hemoptysis, shortness of breath and wheezing. Cardiovascular: Negative for chest pain, palpitations and leg swelling. Gastrointestinal: Positive for nausea. Negative for abdominal pain, diarrhea, heartburn and vomiting. Genitourinary: Negative for dysuria, frequency, hematuria and urgency. Musculoskeletal: Negative for joint pain and myalgias. Skin: Negative for itching and rash. Neurological: Negative for dizziness, seizures, weakness and headaches. Psychiatric/Behavioral: Negative for depression. The patient does not have insomnia. Objective:     Visit Vitals  /81   Pulse 66   Resp 18   Ht 5' 11\" (1.803 m)   Wt 100.2 kg (221 lb)   SpO2 96%   BMI 30.82 kg/m²       ECOG Performance Status (grade): 0  0 - able to carry on all pre-disease activity w/out restriction  1 - restricted but able to carry out light work  2 - ambulatory and can self- care but unable to carry out work  3 - bed or chair >50% of waking hours  4 - completely disable, total care, confined to bed or chair    Physical Exam  Constitutional:       General: He is not in acute distress. HENT:      Head: Normocephalic and atraumatic. Eyes:      Pupils: Pupils are equal, round, and reactive to light. Neck:      Musculoskeletal: Neck supple. No neck rigidity. Cardiovascular:      Pulses: Normal pulses. Heart sounds: Normal heart sounds. No murmur. Pulmonary:      Effort: Pulmonary effort is normal. No respiratory distress. Breath sounds: Normal breath sounds. Abdominal:      General: Bowel sounds are normal. There is no distension. Palpations: Abdomen is soft. There is mass (multiple abdominal wall masses ). Tenderness: There is no abdominal tenderness. There is no guarding. Musculoskeletal:         General: No swelling or tenderness. Lymphadenopathy:      Cervical: No cervical adenopathy. Skin:     General: Skin is warm. Findings: No rash. Neurological:      Mental Status: He is alert and oriented to person, place, and time. Mental status is at baseline. Cranial Nerves: No cranial nerve deficit. Psychiatric:         Mood and Affect: Mood normal.          Diagnostics:      No results found for this or any previous visit (from the past 96 hour(s)). Imaging:  No results found for this or any previous visit. Results for orders placed during the hospital encounter of 05/31/16   XR SWALLOW FUNC VIDEO    Narrative Modified barium swallow with video recording:        INDICATION:    Dysphagia.         The study has been performed under supervision of speech therapist.    The patient swallowed thin liquid but there is been deep penetration almost to  the level of vocal cord. Then patient swallowed thin liquid food with small sips  and with chin-tucked position. At  this time, there has been no penetration or  aspiration of thin liquid into larynx. Then patient swallowed barium tablet. There has    been penetration of thin liquid used for swallowing tablet into larynx. There  is been no evidence of stagnation of tablet in hypopharynx or in upper  esophagus. Patient swallowed puree and cracker normally without penetration or  nasopharyngeal into larynx. Fluoroscopy time utilized is 48 seconds. No fluoroscopy images is obtained. Impression IMPRESSION:    At first there is penetration of thin liquid into larynx as described. But when  the patient swallowed thin liquid in small sips with chin tuck position, there  is been no penetration or aspiration into larynx. Patient swallowed barium tablet but there has  been penetration of the liquid  used with a tablet. Swallowing of puree and cracker has been normal.         Results for orders placed during the hospital encounter of 09/18/20   CT ABD PELV W CONT    Narrative EXAM: CT ABDOMEN PELVIS WITH CONTRAST    INDICATION: Abdominal wall bulge    COMPARISON: January 1, 2016. CONTRAST: 100 mL of Isovue-300. TECHNIQUE:    Multislice helical CT was performed from the diaphragm to the symphysis pubis  during uneventful rapid bolus intravenous contrast administration. Oral contrast  administered. Contiguous 5 mm axial images were reconstructed and lung and soft  tissue windows were generated. Coronal and sagittal reformations were generated. CT dose reduction was achieved through use of a standardized protocol tailored  for this examination and automatic exposure control for dose modulation.      FINDINGS:  Subendocardial fat deposition again noted along the left ventricular wall with  ventricular wall thinning reflective of remote ischemia. Coronary artery  atherosclerosis noted. Emphysematous changes partially evaluated with bibasilar  scarring. .    Stable small hepatic hypodensities. Stable 1.4 cm right adrenal nodule. Interval  increase in size and/or development of low density nodule within the left  adrenal gland on series 2 image 27 which does not meet criteria for simple  benign adenoma measuring 1.3 cm. There are no focal abnormalities within the  spleen, pancreas  or kidneys. The aorta tapers without aneurysm. There is no retroperitoneal adenopathy or  mass. Bowel: Underdistended which limits evaluation. . The appendix is unremarkable. There is no ascites or free intraperitoneal air. Extensive nodularity noted  throughout the abdominal and pelvic mesentery for example within the right  hemipelvis on series 2 image 77 measuring 5.0 x 4.7 cm. Largest mass is located  midline with extension just right of midline and measures 15.8 x 10.7 x 12.3 cm. It is heterogeneous in appearance but it appears to be necrotic and/or cystic  internally with areas of either peripheral enhancement and/or hemorrhage.  -Innumerable additional similar lesions noted throughout. Extensive nodularity  is noted within the anterior abdomen with some of the malignant nodules  herniated through the anterior abdominal wall. There are several nodules noted  within the anterior abdomen. Some are directly extending from the largest mass  inferiorly and are involving the anterior abdominal muscles. There are some  areas of benign herniation for example on series 2 image 48 containing only  mesenteric fat but primarily it is malignant herniation that is identified. There is some nonspecific stranding and subcutaneous skin thickening within this  region which could be reflective of inflammation or additional malignancy. The prostate, bladder and seminal vesicles unremarkable. . Pelvic masses noted as  discussed above with additional pelvic masses not measured. .    Osseous degenerative changes. Trace rotoscoliosis. Mild loss of lumbar lordosis. Decreased bone mineral density. Impression IMPRESSION:     Extensive abdominal and pelvic mesenteric masses with some masses directly  extending and penetrating through the anterior abdominal wall reflective of the  given history of bulge. There are some regions of benign herniation but the vast  majority is malignant. Findings may be primary and/or metastatic and within the  spectrum of carcinomatosis. .    Left adrenal nodule new since prior exam. Metastasis is not excluded. Stable right adrenal nodule. Stable hepatic hypodensities. Please see report for full findings and details. Assessment:     1. Abdominal mass, unspecified abdominal location    2. Gastrointestinal stromal tumor ([address]    3. Pulmonary embolism, other, unspecified chronicity, unspecified whether acute cor pulmonale present ([address]    4. Polycythemia        Plan:     Abdominal mass  History of GIST 2010:  -- History of GIST, Dx 2013  -- 9/18/2020 CT reported extensive abdominal and pelvic mesenteric masses with some masses directly extending and penetrating through the anterior abdominal wall reflective of the given history of bulge. There are some regions of benign herniation but the vast majority is malignant. Findings may be primary and/or metastatic and within the spectrum of carcinomatosis. Left adrenal nodule new since prior exam. Metastasis is not excluded. Stable right adrenal nodule. Stable hepatic hypodensities. -- Plan to obtain tissue diagnosis. I have discussed with Dr. Jennifer England, will refer for IR guided biopsy. -- He will RTC 1-2 weeks after biopsy for further discussion. Always sooner if required. Pulmonary embolus  -- He was diagnosed with one-time pulmonary embolus in May of 2016. Patient was being followed by Dr. Cynthia Garrett who retired recently.  He states that he is taking Xarelto 20mg PO daily since then. Thrombophilia w/u was negative reportedly. -- We will obtain D-Dimer and CTA. If PE resolves can consider to d/c Xarelto. Polycythemia  -- He is an active smoker. He did not receive his phlebotomy for months for his polycythemia. -- His polycythemia was likely secondary 2/2 smoking. Recent CBC on 7/2020 showed improving H/H, 14/42. 4. I have counseled him on smoking cessation. -- Will repeat CBC and obtain JAK2 to r/o PV. Orders Placed This Encounter    CTA CHEST W OR W WO CONT     Standing Status:   Future     Standing Expiration Date:   10/30/2021     Order Specific Question:   STAT Creatinine as indicated     Answer: Yes    METABOLIC PANEL, COMPREHENSIVE     Standing Status:   Future     Standing Expiration Date:   10/1/2021    CBC WITH AUTOMATED DIFF     Standing Status:   Future     Standing Expiration Date:   10/1/2021    JAK2 MUTATION ANALYSIS     Standing Status:   Future     Standing Expiration Date:   9/30/2021    D DIMER     Standing Status:   Future     Standing Expiration Date:   10/1/2021           Mr. Brayan Iglesias has a reminder for a \"due or due soon\" health maintenance. I have asked that he contact his primary care provider for follow-up on this health maintenance. All of patient's questions answered to their apparent satisfaction. They verbally show understanding and agreement with aforementioned plan. Amy Davenport MD  9/30/2020          About 25 minutes were spent for this encounter with more than 50% of the time spent in face-to-face counseling, discussing on diagnosis and management plan going forward, and co-ordination of care. Parts of this document has been produced using Dragon dictation system. Unrecognized errors in transcription may be present. Please do not hesitate to reach out for any questions or clarifications.       CC: RADHA Dorsey; Adan Cervantes M.D.

## 2020-10-12 ENCOUNTER — HOSPITAL ENCOUNTER (OUTPATIENT)
Dept: CT IMAGING | Age: 55
Discharge: HOME | End: 2020-10-12
Attending: INTERNAL MEDICINE
Payer: COMMERCIAL

## 2020-10-12 LAB — CREAT UR-MCNC: 0.8 MG/DL (ref 0.6–1.3)

## 2020-10-12 PROCEDURE — 82565 ASSAY OF CREATININE: CPT

## 2020-10-12 PROCEDURE — 74011000636 HC RX REV CODE- 636: Performed by: INTERNAL MEDICINE

## 2020-10-12 PROCEDURE — 71275 CT ANGIOGRAPHY CHEST: CPT

## 2020-10-12 RX ADMIN — IOPAMIDOL 80 ML: 755 INJECTION, SOLUTION INTRAVENOUS at 13:55

## 2020-11-04 DIAGNOSIS — R19.00 ABDOMINAL MASS, UNSPECIFIED ABDOMINAL LOCATION: Primary | ICD-10-CM

## 2020-11-04 DIAGNOSIS — R19.00 ABDOMINAL MASS, UNSPECIFIED ABDOMINAL LOCATION: ICD-10-CM

## 2020-12-03 ENCOUNTER — HOSPITAL ENCOUNTER (OUTPATIENT)
Dept: CT IMAGING | Age: 55
Discharge: HOME | End: 2020-12-03
Attending: RADIOLOGY | Admitting: RADIOLOGY
Payer: COMMERCIAL

## 2020-12-03 VITALS
HEART RATE: 60 BPM | DIASTOLIC BLOOD PRESSURE: 79 MMHG | TEMPERATURE: 97.8 F | OXYGEN SATURATION: 94 % | BODY MASS INDEX: 30.24 KG/M2 | SYSTOLIC BLOOD PRESSURE: 125 MMHG | RESPIRATION RATE: 16 BRPM | HEIGHT: 71 IN | WEIGHT: 216 LBS

## 2020-12-03 DIAGNOSIS — R19.00 ABDOMINAL MASS, UNSPECIFIED ABDOMINAL LOCATION: ICD-10-CM

## 2020-12-03 LAB
ANION GAP SERPL CALC-SCNC: 5 MMOL/L (ref 3–18)
APTT PPP: 30.6 SEC (ref 23–36.4)
BUN SERPL-MCNC: 13 MG/DL (ref 7–18)
BUN/CREAT SERPL: 16 (ref 12–20)
CALCIUM SERPL-MCNC: 9.3 MG/DL (ref 8.5–10.1)
CHLORIDE SERPL-SCNC: 107 MMOL/L (ref 100–111)
CO2 SERPL-SCNC: 27 MMOL/L (ref 21–32)
CREAT SERPL-MCNC: 0.8 MG/DL (ref 0.6–1.3)
ERYTHROCYTE [DISTWIDTH] IN BLOOD BY AUTOMATED COUNT: 16.1 % (ref 11.6–14.5)
GLUCOSE SERPL-MCNC: 97 MG/DL (ref 74–99)
HCT VFR BLD AUTO: 39.1 % (ref 36–48)
HGB BLD-MCNC: 13.4 G/DL (ref 13–16)
INR PPP: 1.1 (ref 0.8–1.2)
MCH RBC QN AUTO: 29 PG (ref 24–34)
MCHC RBC AUTO-ENTMCNC: 34.3 G/DL (ref 31–37)
MCV RBC AUTO: 84.6 FL (ref 74–97)
PLATELET # BLD AUTO: 268 K/UL (ref 135–420)
PMV BLD AUTO: 10.2 FL (ref 9.2–11.8)
POTASSIUM SERPL-SCNC: 4.2 MMOL/L (ref 3.5–5.5)
PROTHROMBIN TIME: 14.5 SEC (ref 11.5–15.2)
RBC # BLD AUTO: 4.62 M/UL (ref 4.7–5.5)
SODIUM SERPL-SCNC: 139 MMOL/L (ref 136–145)
WBC # BLD AUTO: 8.6 K/UL (ref 4.6–13.2)

## 2020-12-03 PROCEDURE — 88333 PATH CONSLTJ SURG CYTO XM 1: CPT

## 2020-12-03 PROCEDURE — 88342 IMHCHEM/IMCYTCHM 1ST ANTB: CPT

## 2020-12-03 PROCEDURE — 88334 PATH CONSLTJ SURG CYTO XM EA: CPT

## 2020-12-03 PROCEDURE — 85730 THROMBOPLASTIN TIME PARTIAL: CPT

## 2020-12-03 PROCEDURE — 88307 TISSUE EXAM BY PATHOLOGIST: CPT

## 2020-12-03 PROCEDURE — 80048 BASIC METABOLIC PNL TOTAL CA: CPT

## 2020-12-03 PROCEDURE — 49180 BIOPSY ABDOMINAL MASS: CPT

## 2020-12-03 PROCEDURE — 88341 IMHCHEM/IMCYTCHM EA ADD ANTB: CPT

## 2020-12-03 PROCEDURE — 85027 COMPLETE CBC AUTOMATED: CPT

## 2020-12-03 PROCEDURE — 74011250636 HC RX REV CODE- 250/636: Performed by: RADIOLOGY

## 2020-12-03 PROCEDURE — 85610 PROTHROMBIN TIME: CPT

## 2020-12-03 PROCEDURE — 74011000272 HC RX REV CODE- 272: Performed by: RADIOLOGY

## 2020-12-03 PROCEDURE — 88309 TISSUE EXAM BY PATHOLOGIST: CPT

## 2020-12-03 RX ORDER — SODIUM CHLORIDE 9 MG/ML
20 INJECTION, SOLUTION INTRAVENOUS CONTINUOUS
Status: DISCONTINUED | OUTPATIENT
Start: 2020-12-03 | End: 2020-12-07 | Stop reason: HOSPADM

## 2020-12-03 RX ORDER — HYDROCODONE BITARTRATE AND ACETAMINOPHEN 5; 325 MG/1; MG/1
1-2 TABLET ORAL
Status: DISCONTINUED | OUTPATIENT
Start: 2020-12-03 | End: 2020-12-04 | Stop reason: HOSPADM

## 2020-12-03 RX ORDER — MIDAZOLAM HYDROCHLORIDE 1 MG/ML
.5-2 INJECTION, SOLUTION INTRAMUSCULAR; INTRAVENOUS
Status: DISCONTINUED | OUTPATIENT
Start: 2020-12-03 | End: 2020-12-07 | Stop reason: HOSPADM

## 2020-12-03 RX ORDER — FENTANYL CITRATE 50 UG/ML
12.5-5 INJECTION, SOLUTION INTRAMUSCULAR; INTRAVENOUS
Status: DISCONTINUED | OUTPATIENT
Start: 2020-12-03 | End: 2020-12-07 | Stop reason: HOSPADM

## 2020-12-03 RX ADMIN — MIDAZOLAM 1 MG: 1 INJECTION INTRAMUSCULAR; INTRAVENOUS at 09:30

## 2020-12-03 RX ADMIN — FENTANYL CITRATE 25 MCG: 50 INJECTION, SOLUTION INTRAMUSCULAR; INTRAVENOUS at 09:40

## 2020-12-03 RX ADMIN — FENTANYL CITRATE 25 MCG: 50 INJECTION, SOLUTION INTRAMUSCULAR; INTRAVENOUS at 09:35

## 2020-12-03 RX ADMIN — SODIUM CHLORIDE 20 ML/HR: 900 INJECTION, SOLUTION INTRAVENOUS at 06:53

## 2020-12-03 RX ADMIN — Medication 1 EACH: at 09:41

## 2020-12-03 RX ADMIN — FENTANYL CITRATE 50 MCG: 50 INJECTION, SOLUTION INTRAMUSCULAR; INTRAVENOUS at 09:30

## 2020-12-03 RX ADMIN — MIDAZOLAM 0.5 MG: 1 INJECTION INTRAMUSCULAR; INTRAVENOUS at 09:40

## 2020-12-03 RX ADMIN — MIDAZOLAM 0.5 MG: 1 INJECTION INTRAMUSCULAR; INTRAVENOUS at 09:35

## 2020-12-03 NOTE — H&P
OUTPATIENT HISTORY AND PHYSICAL      Today 12/3/2020     Indication/Symptoms:   Guerrero Rivera is a 60 y.o. male here for Liver mass bx    Current Meds:    Prior to Admission medications    Medication Sig Start Date End Date Taking? Authorizing Provider   hydroCHLOROthiazide (HYDRODIURIL) 25 mg tablet take 1 tablet by mouth once daily 9/23/20  Yes Nehemiah CEDEÑO NP   ramipriL (ALTACE) 10 mg capsule take 1 capsule by mouth daily 7/16/20  Yes Blade Ward MD   metoprolol tartrate (LOPRESSOR) 25 mg tablet take 1 tablet by mouth twice a day 2/18/20  Yes Nehemiah Moyer NP   traZODone (DESYREL) 150 mg tablet Take 150 mg by mouth nightly. Yes Provider, Historical   topiramate (Topamax) 25 mg tablet Take  by mouth two (2) times a day.     Provider, Historical   rivaroxaban (Xarelto) 20 mg tab tablet take 1 tablet by mouth once daily WITH DINNER 6/5/20   Meuhl Enrique NP       Allergies:    No Known Allergies    Comorbid Conditions:    Past Medical History:   Diagnosis Date    CAD (coronary artery disease)     Carpal tunnel syndrome     COPD     Depression     H/O echocardiogram 11/2017    EF 35-40%, mild LVH, biatrial enlargement, mild TR, RVSP 41    Hemorrhoids     History of esophagitis     History of malignant gastrointestinal stromal tumor (GIST)     Hypercholesterolemia     Hypertension     Myocardial infarction [facility])     Pulmonary embolus ([facility] [address] June 2016    Bilateral    S/P CABG x 3 2009    LIMA to LAD, SVG to RPDA, SVG to diagonal    S/P cardiac catheterization November 2011    Patent GALLAGHER to LAD, patent SVG to diet, and occluded SVG to RPDA,  native LAD 85% proximal stenosis, left circumflex 20% diffuse disease, RCA distal 70% stenosis,, EF 45%    Secondary polycythemia           Past Surgical History:   Procedure Laterality Date    CARDIAC SURG PROCEDURE UNLIST      stent    HX CORONARY STENT PLACEMENT      HX CYST REMOVAL      HX GI      tumor near stomach    HX HEENT      deviated septum    HX HEMORRHOIDECTOMY      HX ORTHOPAEDIC      knee injury right    HX OTHER SURGICAL      resection of mesenteric mass     Data:    Visit Vitals  /75 (BP 1 Location: Right arm, BP Patient Position: At rest)   Pulse 71   Temp 97.6 °F (36.4 °C)   Resp 20   Ht 5' 11\" (1.803 m)   Wt 98 kg (216 lb)   SpO2 97%   BMI 30.13 kg/m²   :  Recent Labs     12/03/20  0648        Recent Labs     12/03/20  0648   INR 1.1   APTT 30.6       The H & P and/or progress notes and any available imaging were reviewed. The risks, indications and possible alternatives to the procedure, including doing nothing, were discussed and informed consent was obtained. Physical Exam:      Mental status:   Alert and oriented. Examination specific to the procedure proposed to be performed and any co morbid conditions:   Mallampati classification 3 ,  ASA III   Heart:   RRR. Lungs:   CTAB. No wheezes, rales or rhonchi. The patient is an appropriate candidate to undergo the planned procedure and sedation.     Alana Buerger, PA-C

## 2020-12-03 NOTE — DISCHARGE INSTRUCTIONS
DISCHARGE SUMMARY from Nurse  PATIENT INSTRUCTIONS:    Resume blood thinners in 48 hours  May remove dressing in 48hrs and shower  No strenuous activities for 1 week; Walking encouraged  Resume regular diet, drink plenty of fluids, avoid constipation and straining with bowel movements. After general anesthesia or intravenous sedation, for 24 hours or while taking prescription Narcotics:  · Limit your activities  · Do not drive and operate hazardous machinery  · Do not make important personal or business decisions  · Do  not drink alcoholic beverages  · If you have not urinated within 8 hours after discharge, please contact your surgeon on call. Report the following to your surgeon:  · Excessive pain, swelling, redness or odor of or around the surgical area  · Temperature over 100.5  · Nausea and vomiting lasting longer than 4 hours or if unable to take medications  · Any signs of decreased circulation or nerve impairment to extremity: change in color, persistent  numbness, tingling, coldness or increase pain  · Any questions    What to do at Home:  Recommended activity: Activity as tolerated and no driving for today. *  Please give a list of your current medications to your Primary Care Provider. *  Please update this list whenever your medications are discontinued, doses are      changed, or new medications (including over-the-counter products) are added. *  Please carry medication information at all times in case of emergency situations. These are general instructions for a healthy lifestyle:    No smoking/ No tobacco products/ Avoid exposure to second hand smoke  Surgeon General's Warning:  Quitting smoking now greatly reduces serious risk to your health.     Obesity, smoking, and sedentary lifestyle greatly increases your risk for illness    A healthy diet, regular physical exercise & weight monitoring are important for maintaining a healthy lifestyle    You may be retaining fluid if you have a history of heart failure or if you experience any of the following symptoms:  Weight gain of 3 pounds or more overnight or 5 pounds in a week, increased swelling in our hands or feet or shortness of breath while lying flat in bed. Please call your doctor as soon as you notice any of these symptoms; do not wait until your next office visit. The discharge information has been reviewed with the patient. The patient verbalized understanding. Discharge medications reviewed with the patient and appropriate educational materials and side effects teaching were provided.   ___________________________________________________________________________________________________________________________________

## 2020-12-03 NOTE — ROUTINE PROCESS
TRANSFER - OUT REPORT:    Verbal report given to [address] [place] [address] on Alex Champagne  being transferred to Phase 2 for routine progression of care       Report consisted of patients Situation, Background, Assessment and   Recommendations(SBAR). Information from the following report(s) SBAR, Kardex, Procedure Summary, Intake/Output, MAR and Recent Results was reviewed with the receiving nurse. Lines:   Peripheral IV 12/03/20 Left Wrist (Active)   Site Assessment Clean, dry, & intact 12/03/20 0920   Phlebitis Assessment 0 12/03/20 0920   Infiltration Assessment 0 12/03/20 0920   Dressing Status Clean, dry, & intact 12/03/20 0920   Dressing Type Transparent 12/03/20 0920   Hub Color/Line Status Pink; Infusing;Patent 12/03/20 0920        Opportunity for questions and clarification was provided.       Patient transported with:   Registered Nurse  Tech

## 2020-12-03 NOTE — PROCEDURES
INTERVENTIONAL RADIOLOGY POST-OP NOTE    December 3, 2020 5:53 PM    Preoperative Diagnosis:   Metastatic GI stromal tumor. Postoperative Diagnosis:  Same. Attending Physician: Moreno Hensley MD, who was present for the entire procedure. : Lorena Dietz MD    Assistant: None    Type of Anesthesia: Moderate sedation with IV fentanyl and versed. Local anaesthesia with 1% lidocaine. Procedure/Description:  CT guided abdominal wall mass bx. 8 18G core specimens. No complication on post.    Findings:   As above. Estimated blood Loss: Minimal    Specimen Removed:   Yes. Drains:  No.    Complications: None    Condition: Stable    Discharge Plan: D/c home.     Goran Cowart  IR Resident, PGY-5  [phone]

## 2020-12-12 NOTE — PROGRESS NOTES
Hematology/Oncology  Progress Note    Name: Valerie   Date: 2020  : 1965    RADHA Villaseñor     Mr. Ailyn Archer is a 60y.o. year old male who was seen for Pulmonary emboli in the setting of acquired thrombophilia and history of GIST,   Hx GIST small bowel cancer removed 2010, s.p Gleevec    Current therapy: Xarelto 20mg PO daily. Subjective:     Mr. Ailyn Archer is a 49-year-old man who was diagnosed with a pulmonary embolus in May of 2016. Patient was being followed by Dr. Sylvia Ballard who retired. He states that he is taking Xarelto 20mg PO daily. He admits smoking 1 ppd from 1 /2ppd and states he is willing to cut back some more until he totally quits. He went to abdominal wall biopsy recently which confirmed recurrent GIST. He has hx of GIST in . He reported he was taking Gleevec for about one year after surgery at that time. Denied significant pain or vomiting or altered bowel movements. He is an active smoker. He did not receive his phlebotomy for months for his polycythemia. The patient otherwise has no other complaints. Denied fever, chills, night sweat, unintentional weight loss, skin lumps or bumps, acute bleeding or bruising issues. No acute bleeding, blood in stool, dark stool, melena, hematochezia, hemoptysis, dark urine, or easily bruising. Denied headache, acute vision change, dizziness, chest pain, worsen shortness of breath, palpitation, productive cough, nausea, vomiting, abdominal pain, altered bowel habits, dysuria, new bone pain or back pain, focal numbness or weakness. Past medical history, family history, and social history: these were reviewed and remains unchanged.     Past Medical History:   Diagnosis Date    CAD (coronary artery disease)     Carpal tunnel syndrome     COPD     Depression     H/O echocardiogram 2017    EF 35-40%, mild LVH, biatrial enlargement, mild TR, RVSP 41    Hemorrhoids     History of esophagitis     History of malignant gastrointestinal stromal tumor (GIST)     Hypercholesterolemia     Hypertension     Myocardial infarction [facility])     Pulmonary embolus [facility]) June 2016    Bilateral    S/P CABG x 3 2009    LIMA to LAD, SVG to RPDA, SVG to diagonal    S/P cardiac catheterization November 2011    Patent GALLAGHER to LAD, patent SVG to diet, and occluded SVG to RPDA,  native LAD 85% proximal stenosis, left circumflex 20% diffuse disease, RCA distal 70% stenosis,, EF 45%    Secondary polycythemia      Past Surgical History:   Procedure Laterality Date    CARDIAC SURG PROCEDURE UNLIST      stent    HX CORONARY STENT PLACEMENT      HX CYST REMOVAL      HX GI      tumor near stomach    HX HEENT      deviated septum    HX HEMORRHOIDECTOMY      HX ORTHOPAEDIC      knee injury right    HX OTHER SURGICAL      resection of mesenteric mass     Social History     Socioeconomic History    Marital status: SINGLE     Spouse name: Not on file    Number of children: Not on file    Years of education: Not on file    Highest education level: Not on file   Occupational History    Not on file   Social Needs    Financial resource strain: Not on file    Food insecurity     Worry: Not on file     Inability: Not on file    Transportation needs     Medical: Not on file     Non-medical: Not on file   Tobacco Use    Smoking status: Current Every Day Smoker     Packs/day: 1.50    Smokeless tobacco: Never Used   Substance and Sexual Activity    Alcohol use: Yes     Frequency: Monthly or less     Comment: \"I haven't drank in 2 months, but I was drinking about 12 pk beer per week\"     Drug use: No    Sexual activity: Not on file   Lifestyle    Physical activity     Days per week: Not on file     Minutes per session: Not on file    Stress: Not on file   Relationships    Social connections     Talks on phone: Not on file     Gets together: Not on file     Attends Gnosticism service: Not on file     Active member of club or organization: Not on file     Attends meetings of clubs or organizations: Not on file     Relationship status: Not on file    Intimate partner violence     Fear of current or ex partner: Not on file     Emotionally abused: Not on file     Physically abused: Not on file     Forced sexual activity: Not on file   Other Topics Concern    Not on file   Social History Narrative    Not on file     No family history on file. Current Outpatient Medications   Medication Sig Dispense Refill    hydroCHLOROthiazide (HYDRODIURIL) 25 mg tablet take 1 tablet by mouth once daily 30 Tab 6    topiramate (Topamax) 25 mg tablet Take  by mouth two (2) times a day.  ramipriL (ALTACE) 10 mg capsule take 1 capsule by mouth daily 90 Cap 3    rivaroxaban (Xarelto) 20 mg tab tablet take 1 tablet by mouth once daily WITH DINNER 90 Tab 3    metoprolol tartrate (LOPRESSOR) 25 mg tablet take 1 tablet by mouth twice a day 180 Tab 3    traZODone (DESYREL) 150 mg tablet Take 150 mg by mouth nightly. Review of Systems   Constitutional: Negative for chills, diaphoresis, fever, malaise/fatigue and weight loss. Respiratory: Negative for cough, hemoptysis, shortness of breath and wheezing. Cardiovascular: Negative for chest pain, palpitations and leg swelling. Gastrointestinal: Positive for nausea. Negative for abdominal pain, diarrhea, heartburn and vomiting. Genitourinary: Negative for dysuria, frequency, hematuria and urgency. Musculoskeletal: Negative for joint pain and myalgias. Skin: Negative for itching and rash. Neurological: Negative for dizziness, seizures, weakness and headaches. Psychiatric/Behavioral: Negative for depression. The patient does not have insomnia.              Objective:     Visit Vitals  /86   Pulse 69   Resp 16   Ht 5' 11\" (1.803 m)   Wt 98.2 kg (216 lb 9.6 oz)   SpO2 97%   BMI 30.21 kg/m²       ECOG Performance Status (grade): 1  0 - able to carry on all pre-disease activity w/out restriction  1 - restricted but able to carry out light work  2 - ambulatory and can self- care but unable to carry out work  3 - bed or chair >50% of waking hours  4 - completely disable, total care, confined to bed or chair    Physical Exam  Constitutional:       General: He is not in acute distress. HENT:      Head: Normocephalic and atraumatic. Eyes:      Pupils: Pupils are equal, round, and reactive to light. Neck:      Musculoskeletal: Neck supple. No neck rigidity. Cardiovascular:      Pulses: Normal pulses. Heart sounds: Normal heart sounds. No murmur. Pulmonary:      Effort: Pulmonary effort is normal. No respiratory distress. Breath sounds: Normal breath sounds. Abdominal:      General: Bowel sounds are normal. There is no distension. Palpations: Abdomen is soft. There is mass (multiple abdominal wall masses ). Tenderness: There is no abdominal tenderness. There is no guarding. Musculoskeletal:         General: No swelling or tenderness. Lymphadenopathy:      Cervical: No cervical adenopathy. Skin:     General: Skin is warm. Findings: No rash. Neurological:      Mental Status: He is alert and oriented to person, place, and time. Mental status is at baseline. Cranial Nerves: No cranial nerve deficit. Psychiatric:         Mood and Affect: Mood normal.          Diagnostics:      No results found for this or any previous visit (from the past 96 hour(s)). Imaging:  No results found for this or any previous visit. Results for orders placed during the hospital encounter of 05/31/16   XR SWALLOW [facility] VIDEO    Narrative Modified barium swallow with video recording:        INDICATION:    Dysphagia. The study has been performed under supervision of speech therapist.    The patient swallowed thin liquid but there is been deep penetration almost to  the level of vocal cord.  Then patient swallowed thin liquid food with small sips  and with chin-tucked position. At  this time, there has been no penetration or  aspiration of thin liquid into larynx. Then patient swallowed barium tablet. There has    been penetration of thin liquid used for swallowing tablet into larynx. There  is been no evidence of stagnation of tablet in hypopharynx or in upper  esophagus. Patient swallowed puree and cracker normally without penetration or  nasopharyngeal into larynx. Fluoroscopy time utilized is 48 seconds. No fluoroscopy images is obtained. Impression IMPRESSION:    At first there is penetration of thin liquid into larynx as described. But when  the patient swallowed thin liquid in small sips with chin tuck position, there  is been no penetration or aspiration into larynx. Patient swallowed barium tablet but there has  been penetration of the liquid  used with a tablet. Swallowing of puree and cracker has been normal.         Results for orders placed during the hospital encounter of 12/03/20   CT BX ABD MASS NDL PERC    Narrative PREOPERATIVE DIAGNOSIS: Liver mass. POSTOPERATIVE DIAGNOSIS: Same    INDICATION: Metastatic GI stromal tumor. (s)  Resident: Jessica Ferreira, PGY-5  Attending: Coredll Mayen MD who was present for the entire procedure. PROCEDURES: CT-guided core needle biopsy of the right hepatic lobe mass. Anesthesia:   Sedation: Moderate sedation with versed and fentanyl. Anesthesia given and  monitored per qualified, independently trained interventional radiology nurse  under my direct supervision. Please see detailed nursing records for medication  dosing. .    Sedation time: 20 minutes. Local: 1% lidocaine. Radiation dose:   Dose length product = 1679 mGy-cm. One or more dose reduction techniques were used on this CT: automated exposure  control, adjustment of the mAs and/or kVp according to patient's size, and  iterative reconstruction techniques.  The specific techniques utilized on this CT  exam have been documented in the patient's electronic medical record. CONTRAST: None. COMPLICATIONS: None    DRAIN: No    CATHETER: None. EBL: Minimal.    SPECIMEN: 8 core biopsy specimens from the abdominal wall mass were obtained. Specimens were given to pathology on site. Sampling was adequate. TECHNIQUE:   After detailed explanation of risks and benefits of the procedure verbal and  written consent were obtained. Patient was brought to the CT room and placed  supine on the table. Timeout was performed.  view of the abdomen was  obtained. Target lesion was identified and skin was marked. The abdomen was  prepped and draped in usual sterile fashion. Maximum sterile barrier technique  was used. 1% lidocaine solution was instilled in the skin superficial and deep  subcutaneous soft tissues overlying the biopsy region. Under direct CT fluoroscopy guidance using 18-gauge [place] core biopsy  needle was advanced down through the guide into the lesion. 8 passes were made  and core biopsies were given to pathology on site. Specimen was adequate per  pathology. Needle was removed. Postbiopsy imaging was obtained. Sterile dressing  was applied. Hemostasis was achieved with manual compression and Gelfoam slurry. There were no immediate complications. Patient tolerated procedure fairly well. Patient was transferred to recovery in stable condition. FINDINGS: Anterior abdominal wall lesion shows biopsy. CT fluoroscopic guidance demonstrated good position of the biopsy needle. Postbiopsy imaging demonstrated no evidence of bleeding. Impression IMPRESSION:    Successful, uncomplicated CT-guided abdominal wall mass core needle biopsy. PATHOLOGY  12/3/2020 ANTERIOR ABDOMINAL WALL MASS, BIOPSY:   GASTROINTESTINAL STROMAL TUMOR.    GASTROINTESTINAL STROMAL TUMOR (GIST): Biopsy   SPECIMEN   Procedure: Core needle biopsy   TUMOR   Tumor Site: Anterior abdominal wall   Histologic Type: Gastrointestinal stromal tumor, spindle cell type   Histologic Grade: G1: Low grade; mitotic rate <= 5 / 5 mm2   Mitotic Rate: 1 mitoses per 5 mm2   Necrosis: Not identified   Risk Assessment: Cannot be determined: Recurrent   Treatment Effect: No known prebiopsy therapy     DIAGNOSIS COMMENT:   Given the patient's history of gastrointestinal stromal tumor, the findings are consistent with recurrent disease. Assessment:     1. Gastrointestinal stromal tumor ([facility] [address]    2. Abdominal mass, unspecified abdominal location    3. Pulmonary embolism, other, unspecified chronicity, unspecified whether acute cor pulmonale present ([facility] [address]    4. Polycythemia        Plan:     Recurrent GIST  Abdominal wall mass  History of GIST 2010:  -- History of GIST, Dx 2010. S.p adjuvant Imatinib for about 1 year reportedly. -- 9/18/2020 CT reported extensive abdominal and pelvic mesenteric masses with some masses directly extending and penetrating through the anterior abdominal wall reflective of the given history of bulge. There are some regions of benign herniation but the vast majority is malignant. Findings may be primary and/or metastatic and within the spectrum of carcinomatosis. Left adrenal nodule new since prior exam. Metastasis is not excluded. Stable right adrenal nodule. Stable hepatic hypodensities. -- 12/3/2020 Abdominal wall biopsy confirmed recurrent GIST.  -- Today I have reviewed with the patient about biopsy report. We have discussed about the role of local therapy as surgical therapy or radiation therapy, and systemic therapy. Given his previous treatment was [address] (2013) for only 1 year, we can consider to rechallenge Gleevec at this time if appropriate. We will obtain tumor sequencing e.g KIT or PDGFRA mutations to guide next TKIs therapy options as imatinib, sunitinib, or regorafenib. He was also interested in surgical resection if possible; however denied option of XRT at this time.      Plan:  --We will refer him back to Dr. Mary Honeycutt for consultation of possible surgical resection. --We will obtain Tumor [facility] NGS for tumor sequencing e.g KIT, PDGFRA mutations. -- He will RTC 2-3 weeks for follows-up. Always sooner if required. Pulmonary embolus  -- He was diagnosed with one-time pulmonary embolus in May of 2016. Patient was being followed by Dr. Ami Cee who retired recently. He states that he is taking Xarelto 20mg PO daily since then. Thrombophilia w/u was negative reportedly. -- 10/12/2020 CTA reported no evidence of pulmonary embolism. -- We will plan to d/c Xarelto. Patient was advised to continue on ASA for thromboprophylaxis. Polycythemia  -- He is an active smoker. He did not receive his phlebotomy for months for his polycythemia. -- Negative (wild type) for the JAK2 V617F.   -- His polycythemia was likely secondary 2/2 smoking, COPD. Recent CBC on 7/2020 showed improving H/H, 14/42. 4. I have counseled him on smoking cessation. -- Monitor CBC      No orders of the defined types were placed in this encounter. All of patient's questions answered to their apparent satisfaction. They verbally show understanding and agreement with aforementioned plan. Emerson Cordova MD          About 25 minutes were spent for this encounter with more than 50% of the time spent in face-to-face counseling, discussing on diagnosis and management plan going forward, and co-ordination of care. Parts of this document has been produced using Dragon dictation system. Unrecognized errors in transcription may be present. Please do not hesitate to reach out for any questions or clarifications.       CC: RADHA Marques; Jennifer Caceres M.D.

## 2020-12-16 ENCOUNTER — DOCUMENTATION ONLY (OUTPATIENT)
Dept: ONCOLOGY | Age: 55
End: 2020-12-16

## 2020-12-16 ENCOUNTER — OFFICE VISIT (OUTPATIENT)
Dept: ONCOLOGY | Age: 55
End: 2020-12-16
Payer: COMMERCIAL

## 2020-12-16 VITALS
WEIGHT: 216.6 LBS | HEART RATE: 69 BPM | RESPIRATION RATE: 16 BRPM | SYSTOLIC BLOOD PRESSURE: 129 MMHG | OXYGEN SATURATION: 97 % | BODY MASS INDEX: 30.32 KG/M2 | HEIGHT: 71 IN | DIASTOLIC BLOOD PRESSURE: 86 MMHG

## 2020-12-16 DIAGNOSIS — I26.99 PULMONARY EMBOLISM, OTHER, UNSPECIFIED CHRONICITY, UNSPECIFIED WHETHER ACUTE COR PULMONALE PRESENT (HCC): ICD-10-CM

## 2020-12-16 DIAGNOSIS — C49.A0 GASTROINTESTINAL STROMAL TUMOR (HCC): Primary | ICD-10-CM

## 2020-12-16 DIAGNOSIS — D75.1 POLYCYTHEMIA: ICD-10-CM

## 2020-12-16 DIAGNOSIS — R19.00 ABDOMINAL MASS, UNSPECIFIED ABDOMINAL LOCATION: ICD-10-CM

## 2020-12-16 PROCEDURE — 99214 OFFICE O/P EST MOD 30 MIN: CPT | Performed by: INTERNAL MEDICINE

## 2020-12-29 ENCOUNTER — OFFICE VISIT (OUTPATIENT)
Dept: SURGERY | Age: 55
End: 2020-12-29
Payer: COMMERCIAL

## 2020-12-29 VITALS
SYSTOLIC BLOOD PRESSURE: 122 MMHG | DIASTOLIC BLOOD PRESSURE: 78 MMHG | WEIGHT: 217 LBS | TEMPERATURE: 97.4 F | BODY MASS INDEX: 30.38 KG/M2 | HEIGHT: 71 IN | RESPIRATION RATE: 16 BRPM | HEART RATE: 64 BPM | OXYGEN SATURATION: 96 %

## 2020-12-29 DIAGNOSIS — R19.00 ABDOMINAL MASS, UNSPECIFIED ABDOMINAL LOCATION: Primary | ICD-10-CM

## 2020-12-29 DIAGNOSIS — C49.A3 GIST (GASTROINTESTINAL STROMAL TUMOR) OF SMALL BOWEL, MALIGNANT (HCC): ICD-10-CM

## 2020-12-29 PROCEDURE — 99213 OFFICE O/P EST LOW 20 MIN: CPT | Performed by: SURGERY

## 2020-12-29 NOTE — PROGRESS NOTES
Gaylon Boxer is a 60 y.o. male  Chief Complaint   Patient presents with    Follow-up     referred by Dr. Santino Alvarado to discuss surgery for Abdominal wall tumor.

## 2020-12-29 NOTE — PROGRESS NOTES
[facility]  General Surgery    Name: Gaylon Boxer MRN: [id number]   : 1965 [facility]   Date: 2020 Admission Date: No admission date for patient encounter. Subjective:  Patient returns today referral from Dr. Santino Alvarado for assessment for resection of the recurrent GIST tumor. Patient denies any unintentional weight loss. He does have pain. Objective:  Vitals:    20 1118   BP: 122/78   Pulse: 64   Resp: 16   Temp: 97.4 °F (36.3 °C)   TempSrc: Temporal   SpO2: 96%   Weight: 98.4 kg (217 lb)   Height: 5' 11\" (1.803 m)       Physical Exam:    General: Awake and alert, oriented x4, no apparent distress   Abdomen: abdomen is soft with midline and left lower quadrant nodules tenderness. No masses, organomegaly or guarding    Current Medications:  Current Outpatient Medications   Medication Sig Dispense Refill    hydroCHLOROthiazide (HYDRODIURIL) 25 mg tablet take 1 tablet by mouth once daily 30 Tab 6    ramipriL (ALTACE) 10 mg capsule take 1 capsule by mouth daily 90 Cap 3    metoprolol tartrate (LOPRESSOR) 25 mg tablet take 1 tablet by mouth twice a day 180 Tab 3    traZODone (DESYREL) 150 mg tablet Take 150 mg by mouth nightly.  topiramate (Topamax) 25 mg tablet Take  by mouth two (2) times a day.       rivaroxaban (Xarelto) 20 mg tab tablet take 1 tablet by mouth once daily WITH DINNER 90 Tab 3     IMPRESSION:   · Patient with recurrent GIST tumor anterior abdominal wall retroperitoneum      PLAN:/DISCUSION:   · I discussed the patient's care with Dr. Moisés Palencia of [facility]  · Referral to surgical oncology at [facility] for definitive management        Kacey Ortiz MD

## 2021-01-04 ENCOUNTER — HOSPITAL ENCOUNTER (OUTPATIENT)
Dept: LAB | Age: 56
Discharge: HOME | End: 2021-01-04

## 2021-01-24 NOTE — PROGRESS NOTES
Hematology/Oncology  Progress Note    Name: Alejandra Rosenberg  Date: 2021  : 1965    RADHA Ma     Mr. Jc Ugarte is a 60y.o. year old male who was seen for Pulmonary emboli in the setting of acquired thrombophilia and history of GIST,   Hx GIST small bowel cancer removed 2010, s.p Gleevec    Current therapy: Xarelto 20mg PO daily. Subjective:     Mr. Jc Ugarte is a 61-year-old man who was diagnosed with a pulmonary embolus in May of 2016. Patient was being followed by Dr. Enzo Rdz who retired. He states that he is taking Xarelto 20mg PO daily. He admits smoking 1 ppd from 1 /2ppd and states he is willing to cut back some more until he totally quits. He went to abdominal wall biopsy recently which confirmed recurrent GIST. He has hx of GIST in . He reported he was taking Gleevec for about one year after surgery at that time. Today he denied significant pain or vomiting or altered bowel movements. He is an active smoker. He did not receive his phlebotomy for months for his polycythemia. The patient otherwise has no other complaints. Denied fever, chills, night sweat, unintentional weight loss, skin lumps or bumps, acute bleeding or bruising issues. No acute bleeding, blood in stool, dark stool, melena, hematochezia, hemoptysis, dark urine, or easily bruising. Denied headache, acute vision change, dizziness, chest pain, worsen shortness of breath, palpitation, productive cough, nausea, vomiting, abdominal pain, altered bowel habits, dysuria, new bone pain or back pain, focal numbness or weakness. Past medical history, family history, and social history: these were reviewed and remains unchanged.     Past Medical History:   Diagnosis Date    CAD (coronary artery disease)     Carpal tunnel syndrome     COPD     Depression     H/O echocardiogram 2017    EF 35-40%, mild LVH, biatrial enlargement, mild TR, RVSP 41    Hemorrhoids     History of esophagitis     History of malignant gastrointestinal stromal tumor (GIST)     Hypercholesterolemia     Hypertension     Myocardial infarction [facility])     Pulmonary embolus [facility]) June 2016    Bilateral    S/P CABG x 3 2009    LIMA to LAD, SVG to RPDA, SVG to diagonal    S/P cardiac catheterization November 2011    Patent GALLAGHER to LAD, patent SVG to diet, and occluded SVG to RPDA,  native LAD 85% proximal stenosis, left circumflex 20% diffuse disease, RCA distal 70% stenosis,, EF 45%    Secondary polycythemia      Past Surgical History:   Procedure Laterality Date    HX CORONARY STENT PLACEMENT      HX CYST REMOVAL      HX GI      tumor near stomach    HX HEENT      deviated septum    HX HEMORRHOIDECTOMY      HX ORTHOPAEDIC      knee injury right    HX OTHER SURGICAL      resection of mesenteric mass    NH CARDIAC SURG PROCEDURE UNLIST      stent     Social History     Socioeconomic History    Marital status: SINGLE     Spouse name: Not on file    Number of children: Not on file    Years of education: Not on file    Highest education level: Not on file   Occupational History    Not on file   Social Needs    Financial resource strain: Not on file    Food insecurity     Worry: Not on file     Inability: Not on file    Transportation needs     Medical: Not on file     Non-medical: Not on file   Tobacco Use    Smoking status: Current Every Day Smoker     Packs/day: 1.50    Smokeless tobacco: Never Used   Substance and Sexual Activity    Alcohol use: Yes     Frequency: Monthly or less     Comment: \"I haven't drank in 2 months, but I was drinking about 12 pk beer per week\"     Drug use: No    Sexual activity: Not on file   Lifestyle    Physical activity     Days per week: Not on file     Minutes per session: Not on file    Stress: Not on file   Relationships    Social connections     Talks on phone: Not on file     Gets together: Not on file     Attends [employer] service: Not on file     Active member of club or organization: Not on file     Attends meetings of clubs or organizations: Not on file     Relationship status: Not on file    Intimate partner violence     Fear of current or ex partner: Not on file     Emotionally abused: Not on file     Physically abused: Not on file     Forced sexual activity: Not on file   Other Topics Concern    Not on file   Social History Narrative    Not on file     No family history on file. Current Outpatient Medications   Medication Sig Dispense Refill    hydroCHLOROthiazide (HYDRODIURIL) 25 mg tablet take 1 tablet by mouth once daily 30 Tab 6    topiramate (Topamax) 25 mg tablet Take  by mouth two (2) times a day.  ramipriL (ALTACE) 10 mg capsule take 1 capsule by mouth daily 90 Cap 3    rivaroxaban (Xarelto) 20 mg tab tablet take 1 tablet by mouth once daily WITH DINNER 90 Tab 3    metoprolol tartrate (LOPRESSOR) 25 mg tablet take 1 tablet by mouth twice a day 180 Tab 3    traZODone (DESYREL) 150 mg tablet Take 150 mg by mouth nightly. Review of Systems   Constitutional: Negative for chills, diaphoresis, fever, malaise/fatigue and weight loss. Respiratory: Negative for cough, hemoptysis, shortness of breath and wheezing. Cardiovascular: Negative for chest pain, palpitations and leg swelling. Gastrointestinal: Positive for nausea. Negative for abdominal pain, diarrhea, heartburn and vomiting. Genitourinary: Negative for dysuria, frequency, hematuria and urgency. Musculoskeletal: Negative for joint pain and myalgias. Skin: Negative for itching and rash. Neurological: Negative for dizziness, seizures, weakness and headaches. Psychiatric/Behavioral: Negative for depression. The patient does not have insomnia.              Objective:     Visit Vitals  BP (!) 145/95 (BP Patient Position: Sitting)   Pulse 61   Temp 97.6 °F (36.4 °C) (Oral)   Resp 18   Wt 96.3 kg (212 lb 6.4 oz)   SpO2 97%   BMI 29.62 kg/m²       ECOG Performance Status (grade): 1  0 - able to carry on all pre-disease activity w/out restriction  1 - restricted but able to carry out light work  2 - ambulatory and can self- care but unable to carry out work  3 - bed or chair >50% of waking hours  4 - completely disable, total care, confined to bed or chair    Physical Exam  Constitutional:       General: He is not in acute distress. HENT:      Head: Normocephalic and atraumatic. Eyes:      Pupils: Pupils are equal, round, and reactive to light. Neck:      Musculoskeletal: Neck supple. No neck rigidity. Cardiovascular:      Pulses: Normal pulses. Heart sounds: Normal heart sounds. No murmur. Pulmonary:      Effort: Pulmonary effort is normal. No respiratory distress. Breath sounds: Normal breath sounds. Abdominal:      General: Bowel sounds are normal. There is no distension. Palpations: Abdomen is soft. There is mass (multiple abdominal wall masses ). Tenderness: There is no abdominal tenderness. There is no guarding. Musculoskeletal:         General: No swelling or tenderness. Lymphadenopathy:      Cervical: No cervical adenopathy. Skin:     General: Skin is warm. Findings: No rash. Neurological:      Mental Status: He is alert and oriented to person, place, and time. Mental status is at baseline. Cranial Nerves: No cranial nerve deficit. Psychiatric:         Mood and Affect: Mood normal.          Diagnostics:      No results found for this or any previous visit (from the past 96 hour(s)). Imaging:  No results found for this or any previous visit. Results for orders placed during the hospital encounter of 05/31/16   XR SWALLOW FUNC VIDEO    Narrative Modified barium swallow with video recording:        INDICATION:    Dysphagia. The study has been performed under supervision of speech therapist.    The patient swallowed thin liquid but there is been deep penetration almost to  the level of vocal cord.  Then patient swallowed thin liquid food with small sips  and with chin-tucked position. At  this time, there has been no penetration or  aspiration of thin liquid into larynx. Then patient swallowed barium tablet. There has    been penetration of thin liquid used for swallowing tablet into larynx. There  is been no evidence of stagnation of tablet in hypopharynx or in upper  esophagus. Patient swallowed puree and cracker normally without penetration or  nasopharyngeal into larynx. Fluoroscopy time utilized is 48 seconds. No fluoroscopy images is obtained. Impression IMPRESSION:    At first there is penetration of thin liquid into larynx as described. But when  the patient swallowed thin liquid in small sips with chin tuck position, there  is been no penetration or aspiration into larynx. Patient swallowed barium tablet but there has  been penetration of the liquid  used with a tablet. Swallowing of puree and cracker has been normal.         Results for orders placed during the hospital encounter of 12/03/20   CT BX ABD MASS NDL PERC    Narrative PREOPERATIVE DIAGNOSIS: Liver mass. POSTOPERATIVE DIAGNOSIS: Same    INDICATION: Metastatic GI stromal tumor. (s)  Resident: Walt Neff, PGY-5  Attending: Ewing Closs, MD who was present for the entire procedure. PROCEDURES: CT-guided core needle biopsy of the right hepatic lobe mass. Anesthesia:   Sedation: Moderate sedation with versed and fentanyl. Anesthesia given and  monitored per qualified, independently trained interventional radiology nurse  under my direct supervision. Please see detailed nursing records for medication  dosing. .    Sedation time: 20 minutes. Local: 1% lidocaine. Radiation dose:   Dose length product = 1679 mGy-cm. One or more dose reduction techniques were used on this CT: automated exposure  control, adjustment of the mAs and/or kVp according to patient's size, and  iterative reconstruction techniques.  The specific techniques utilized on this CT  exam have been documented in the patient's electronic medical record. CONTRAST: None. COMPLICATIONS: None    DRAIN: No    CATHETER: None. EBL: Minimal.    SPECIMEN: 8 core biopsy specimens from the abdominal wall mass were obtained. Specimens were given to pathology on site. Sampling was adequate. TECHNIQUE:   After detailed explanation of risks and benefits of the procedure verbal and  written consent were obtained. Patient was brought to the CT room and placed  supine on the table. Timeout was performed.  view of the abdomen was  obtained. Target lesion was identified and skin was marked. The abdomen was  prepped and draped in usual sterile fashion. Maximum sterile barrier technique  was used. 1% lidocaine solution was instilled in the skin superficial and deep  subcutaneous soft tissues overlying the biopsy region. Under direct CT fluoroscopy guidance using 18-gauge [employer] [place] core biopsy  needle was advanced down through the guide into the lesion. 8 passes were made  and core biopsies were given to pathology on site. Specimen was adequate per  pathology. Needle was removed. Postbiopsy imaging was obtained. Sterile dressing  was applied. Hemostasis was achieved with manual compression and Gelfoam slurry. There were no immediate complications. Patient tolerated procedure fairly well. Patient was transferred to recovery in stable condition. FINDINGS: Anterior abdominal wall lesion shows biopsy. CT fluoroscopic guidance demonstrated good position of the biopsy needle. Postbiopsy imaging demonstrated no evidence of bleeding. Impression IMPRESSION:    Successful, uncomplicated CT-guided abdominal wall mass core needle biopsy. PATHOLOGY  12/3/2020 ANTERIOR ABDOMINAL WALL MASS, BIOPSY:   GASTROINTESTINAL STROMAL TUMOR.    GASTROINTESTINAL STROMAL TUMOR (GIST): Biopsy   SPECIMEN   Procedure: Core needle biopsy   TUMOR   Tumor Site: Anterior abdominal wall   Histologic Type: Gastrointestinal stromal tumor, spindle cell type   Histologic Grade: G1: Low grade; mitotic rate <= 5 / 5 mm2   Mitotic Rate: 1 mitoses per 5 mm2   Necrosis: Not identified   Risk Assessment: Cannot be determined: Recurrent   Treatment Effect: No known prebiopsy therapy     DIAGNOSIS COMMENT:   Given the patient's history of gastrointestinal stromal tumor, the findings are consistent with recurrent disease. Assessment:     1. Gastrointestinal stromal tumor ([address]    2. Abdominal mass, unspecified abdominal location    3. Pulmonary embolism, other, unspecified chronicity, unspecified whether acute cor pulmonale present ([address]    4. Erythrocytosis    5. Tobacco use    6. Pulmonary embolism without acute cor pulmonale, unspecified chronicity, unspecified pulmonary embolism type ([address]        Plan:     Recurrent GIST  Abdominal wall mass  History of GIST 2010:  -- History of GIST, Dx 2010. S.p adjuvant Imatinib for about 1 year reportedly. -- 9/18/2020 CT reported extensive abdominal and pelvic mesenteric masses with some masses directly extending and penetrating through the anterior abdominal wall reflective of the given history of bulge. There are some regions of benign herniation but the vast majority is malignant. Findings may be primary and/or metastatic and within the spectrum of carcinomatosis. Left adrenal nodule new since prior exam. Metastasis is not excluded. Stable right adrenal nodule. Stable hepatic hypodensities. -- 12/3/2020 Abdominal wall biopsy confirmed recurrent GIST.  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 Due to granular background staining, interpretation of PDL 1 immunohistochemical stain is not possible. -- He has seen Dr. Eve Gerber for evaluation of resection.  I have discussed with Dr. Sharon Tee who suggested to start neoadjuvant TKIs prior to surgical resection. He was not interested in XRT at this point. 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 I have explained the potential side effects of Imatinib, patient was agreeable with there plan. Plan:  -- Imatinib at 400 mg daily. -- Labs today and monthly: CBC, CMP  -- He will RTC 4 weeks for follows-up. Always sooner if required. Pulmonary embolus  -- He was diagnosed with one-time pulmonary embolus in May of 2016. Patient was being followed by Dr. Katelyn Aguilera who retired recently. He states that he is taking Xarelto 20mg PO daily since then. Thrombophilia w/u was negative reportedly. -- 10/12/2020 CTA reported no evidence of pulmonary embolism. -- Xarelto was d/c. Patient was advised to continue on ASA 81 mg for thromboprophylaxis. Polycythemia  -- He is an active smoker. He did not receive his phlebotomy for months for his p-olycythemia. -- Negative (wild type) for the JAK2 [id number].   -- His polycythemia was likely secondary 2/2 smoking, COPD. Recent CBC on 7/2020 showed improving H/H, 14/42. 4. I have counseled him on smoking cessation. -- Monitor CBC      Orders Placed This Encounter    METABOLIC PANEL, COMPREHENSIVE     Standing Status:   Future     Number of Occurrences:   1     Standing Expiration Date:   1/27/2022    CBC WITH AUTOMATED DIFF     Standing Status:   Future     Number of Occurrences:   1     Standing Expiration Date:   1/27/2022    aspirin (ASPIRIN) 325 mg tablet     Sig: Take 325 mg by mouth daily.  imatinib (GLEEVEC) 400 mg tablet     Sig: Take one tab by mouth daily with a large glass of water. Dispense:  30 Tab     Refill:  4              All of patient's questions answered to their apparent satisfaction. They verbally show understanding and agreement with aforementioned plan.          Andrea Jackson MD          About 40 minutes were spent for this encounter with more than 50% of the time spent in face-to-face counseling, discussing on diagnosis and management plan going forward, and co-ordination of care. Parts of this document has been produced using Dragon dictation system. Unrecognized errors in transcription may be present. Please do not hesitate to reach out for any questions or clarifications.       CC: RADHA Hagan; Andres Coyle M.D.

## 2021-01-26 ENCOUNTER — OFFICE VISIT (OUTPATIENT)
Dept: ONCOLOGY | Age: 56
End: 2021-01-26
Payer: COMMERCIAL

## 2021-01-26 VITALS
RESPIRATION RATE: 18 BRPM | TEMPERATURE: 97.6 F | OXYGEN SATURATION: 97 % | WEIGHT: 212.4 LBS | SYSTOLIC BLOOD PRESSURE: 145 MMHG | BODY MASS INDEX: 29.62 KG/M2 | HEART RATE: 61 BPM | DIASTOLIC BLOOD PRESSURE: 95 MMHG

## 2021-01-26 DIAGNOSIS — D75.1 ERYTHROCYTOSIS: ICD-10-CM

## 2021-01-26 DIAGNOSIS — I26.99 PULMONARY EMBOLISM, OTHER, UNSPECIFIED CHRONICITY, UNSPECIFIED WHETHER ACUTE COR PULMONALE PRESENT (HCC): ICD-10-CM

## 2021-01-26 DIAGNOSIS — C49.A0 GASTROINTESTINAL STROMAL TUMOR (HCC): Primary | ICD-10-CM

## 2021-01-26 DIAGNOSIS — Z72.0 TOBACCO USE: ICD-10-CM

## 2021-01-26 DIAGNOSIS — I26.99 PULMONARY EMBOLISM WITHOUT ACUTE COR PULMONALE, UNSPECIFIED CHRONICITY, UNSPECIFIED PULMONARY EMBOLISM TYPE (HCC): ICD-10-CM

## 2021-01-26 DIAGNOSIS — R19.00 ABDOMINAL MASS, UNSPECIFIED ABDOMINAL LOCATION: ICD-10-CM

## 2021-01-26 PROCEDURE — 99215 OFFICE O/P EST HI 40 MIN: CPT | Performed by: INTERNAL MEDICINE

## 2021-01-26 RX ORDER — ASPIRIN 325 MG
325 TABLET ORAL DAILY
COMMUNITY

## 2021-01-26 RX ORDER — IMATINIB MESYLATE 400 MG/1
TABLET, FILM COATED ORAL
Qty: 30 TAB | Refills: 4 | Status: SHIPPED | OUTPATIENT
Start: 2021-01-26 | End: 2021-02-02 | Stop reason: SDUPTHER

## 2021-02-01 ENCOUNTER — OFFICE VISIT (OUTPATIENT)
Dept: CARDIOLOGY CLINIC | Age: 56
End: 2021-02-01
Payer: COMMERCIAL

## 2021-02-01 ENCOUNTER — DOCUMENTATION ONLY (OUTPATIENT)
Dept: ONCOLOGY | Age: 56
End: 2021-02-01

## 2021-02-01 VITALS
SYSTOLIC BLOOD PRESSURE: 132 MMHG | OXYGEN SATURATION: 97 % | WEIGHT: 213 LBS | HEIGHT: 71 IN | DIASTOLIC BLOOD PRESSURE: 70 MMHG | HEART RATE: 59 BPM | BODY MASS INDEX: 29.82 KG/M2

## 2021-02-01 DIAGNOSIS — C49.A0 MALIGNANT GASTROINTESTINAL STROMAL TUMOR, UNSPECIFIED SITE (HCC): ICD-10-CM

## 2021-02-01 DIAGNOSIS — I10 ESSENTIAL HYPERTENSION: ICD-10-CM

## 2021-02-01 DIAGNOSIS — E78.00 HYPERCHOLESTEROLEMIA: ICD-10-CM

## 2021-02-01 DIAGNOSIS — Z72.0 TOBACCO ABUSE: ICD-10-CM

## 2021-02-01 DIAGNOSIS — I49.3 SYMPTOMATIC PVCS: ICD-10-CM

## 2021-02-01 DIAGNOSIS — Z95.1 S/P CABG X 3: ICD-10-CM

## 2021-02-01 DIAGNOSIS — E78.5 DYSLIPIDEMIA: ICD-10-CM

## 2021-02-01 DIAGNOSIS — I25.5 ISCHEMIC CARDIOMYOPATHY: ICD-10-CM

## 2021-02-01 DIAGNOSIS — I25.10 CORONARY ARTERY DISEASE INVOLVING NATIVE CORONARY ARTERY OF NATIVE HEART WITHOUT ANGINA PECTORIS: Primary | ICD-10-CM

## 2021-02-01 PROCEDURE — 93000 ELECTROCARDIOGRAM COMPLETE: CPT | Performed by: INTERNAL MEDICINE

## 2021-02-01 PROCEDURE — 99214 OFFICE O/P EST MOD 30 MIN: CPT | Performed by: INTERNAL MEDICINE

## 2021-02-01 NOTE — PROGRESS NOTES
HISTORY OF PRESENT ILLNESS  Mara Wylie is a 60 y.o. male. Follow-up  Associated symptoms include abdominal pain and shortness of breath. Pertinent negatives include no chest pain and no headaches. Shortness of Breath  Associated symptoms include abdominal pain. Pertinent negatives include no fever, no headaches, no cough, no wheezing, no PND, no orthopnea, no chest pain, no vomiting, no rash, no leg swelling and no claudication. Patient presents for a follow-up office visit. He has a past medical history significant for known coronary artery disease with a prior myocardial infarction in the past to his lateral wall with stenting of his obtuse marginal branch. He also had a three-vessel bypass CABG in 2009. His long-standing hypertension, dyslipidemia, COPD with active tobacco use. Lastly, he is a history of a GIST tumor which was resected greater than 7 years ago. [facility]. He is found to be quite hypoxic at that time. He was started on anticoagulation. He underwent an echocardiogram during his hospital stay which showed a mildly depressed LV systolic function, EF 60-08% with a mildly dilated right ventricle with mildly reduced systolic function, but no obvious pulmonary hypertension. The patient remained on Xarelto for anticoagulation since that time. Patient underwent a pharmacologic nuclear stress test in August 2016 which showed a mild to moderately depressed LV function, EF 40% with a mostly fixed but partially reversible distal posterior lateral perfusion defect likely representing an area of prior infarct with yomi-infarct ischemia. This was not significantly changed compared to his known coronary anatomy, so he has been managed medically. Patient underwent a repeat echocardiogram in November 2017 which was unchanged compared to his prior study.   His ejection fraction remained in 35 to 40% range. The patient was last seen in the office approximately 6 months ago. Since last visit, he was diagnosed with a malignant GIST tumor which was fairly large in size. He is now following up with medical oncology and will be starting on Gleevec to hopefully shrink the tumor size with plan on surgical resection later this year. He still complains of exertional dyspnea which has been present for many years but has not progressed. He is still smoking a pack of cigarettes a day. He does notice heart palpitations which only last for a few seconds at most in duration without associated dizziness, diaphoresis or syncope. He still has intermittent abdominal pain and has had a decreased appetite and as result has lost almost 20 pounds in weight. Past Medical History:   Diagnosis Date    CAD (coronary artery disease)     Carpal tunnel syndrome     COPD     Depression     H/O echocardiogram 11/2017    EF 35-40%, mild LVH, biatrial enlargement, mild TR, RVSP 41    Hemorrhoids     History of esophagitis     History of malignant gastrointestinal stromal tumor (GIST)     Hypercholesterolemia     Hypertension     [facility]ocardial infarction [facility])     Pulmonary embolus ([facility] [address] June 2016    Bilateral    S/P CABG x 3 2009    LIMA to LAD, SVG to RPDA, SVG to diagonal    S/P cardiac catheterization November 2011    Patent GALLAGHER to LAD, patent SVG to diet, and occluded SVG to RPDA,  native LAD 85% proximal stenosis, left circumflex 20% diffuse disease, RCA distal 70% stenosis,, EF 45%    Secondary polycythemia       Current Outpatient Medications   Medication Sig Dispense Refill    aspirin (ASPIRIN) 325 mg tablet Take 325 mg by mouth daily.  imatinib (GLEEVEC) 400 mg tablet Take one tab by mouth daily with a large glass of water.  30 Tab 4    hydroCHLOROthiazide (HYDRODIURIL) 25 mg tablet take 1 tablet by mouth once daily 30 Tab 6    ramipriL (ALTACE) 10 mg capsule take 1 capsule by mouth daily 90 Cap 3    metoprolol tartrate (LOPRESSOR) 25 mg tablet take 1 tablet by mouth twice a day 180 Tab 3    traZODone (DESYREL) 150 mg tablet Take 150 mg by mouth nightly. No Known Allergies     Social History     Tobacco Use    Smoking status: Current Every Day Smoker     Packs/day: 1.50    Smokeless tobacco: Never Used   Substance Use Topics    Alcohol use: Yes     Frequency: Monthly or less     Comment: \"I haven't drank in 2 months, but I was drinking about 12 pk beer per week\"     Drug use: No       No family history on file. Review of Systems   Constitutional: Positive for weight loss. Negative for chills and fever. HENT: Negative for nosebleeds. Eyes: Negative for blurred vision and double vision. Respiratory: Positive for shortness of breath. Negative for cough and wheezing. Cardiovascular: Negative for chest pain, palpitations, orthopnea, claudication, leg swelling and PND. Gastrointestinal: Positive for abdominal pain. Negative for heartburn, nausea and vomiting. Genitourinary: Negative for dysuria and hematuria. Musculoskeletal: Negative for falls and myalgias. Skin: Negative for rash. Neurological: Negative for dizziness, focal weakness and headaches. Endo/Heme/Allergies: Does not bruise/bleed easily. Psychiatric/Behavioral: Negative for substance abuse. Visit Vitals  /70 (BP 1 Location: Left upper arm, BP Patient Position: Sitting)   Pulse (!) 59   Ht 5' 11\" (1.803 m)   Wt 96.6 kg (213 lb)   SpO2 97%   BMI 29.71 kg/m²      Physical Exam   Constitutional: He is oriented to person, place, and time. He appears well-developed and well-nourished. HENT:   Head: Normocephalic and atraumatic. Eyes: Conjunctivae are normal.   Neck: Neck supple. No JVD present. Carotid bruit is not present. Cardiovascular: Normal rate, regular rhythm, S1 normal, S2 normal and normal pulses. Exam reveals no gallop and no S3. No murmur heard.   Pulmonary/Chest: He has decreased breath sounds. He has no wheezes. He has no rales. Abdominal: Soft. Bowel sounds are normal. He exhibits mass. He exhibits no distension. There is no abdominal tenderness. Musculoskeletal:         General: No tenderness or edema. Neurological: He is alert and oriented to person, place, and time. Skin: Skin is warm and dry. Psychiatric: He has a normal mood and affect. His behavior is normal.     EKG: Normal sinus rhythm,  Normal axis, borderline inferior Q waves, cannot exclude prior infarct, normal QTc interval, no ST segment changes concerning for ischemia. Occasional junctional premature contractions. Compared to the previous EKG, no significant change. ASSESSMENT and PLAN    Coronary artery disease. Status post three-vessel CABG in 2009. LIMA to LAD, SVG to diagonal SVG to RPDA. The SVG to PDA is known to be occluded on repeat cardiac catheterization in 2011. His native RCA has a 70% distal stenosis which has been managed medically. Patient underwent a pharmacologic nuclear stress test in August 2016, which showed an ejection fraction of 40% and a partial reversible distal posterior lateral defect consistent with his known coronary anatomy. Patient reports that he did not have typical anginal symptoms in the past.  I have recommended a repeat pharmacologic nuclear stress test later this year especially since he will be requiring possible extensive abdominal surgery once his tumor shrinks. He should remain on an aspirin and a beta-blocker. He did not tolerate statins in the past.    Ischemic cardiomyopathy. EF 35-40% on his most recent echocardiogram in November 2017. He remains on appropriate medical therapy including a beta-blocker and ACE inhibitor. He has never required scheduled loop diuretics. He does not appear to have decompensated heart failure. I have recommended repeat an echocardiogram to reevaluate his LV function. Hypertension.   Patient blood pressure is reasonably well-controlled on his current medical regimen. All of which I would continue. Dyslipidemia. Patient did not tolerate simvastatin or Crestor. He may be a candidate for Repatha in the future. Tobacco use disorder. Patient is currently smoking a pack of cigarettes a day which is down from his chronic 2 packs of cigarettes a day. He was encouraged to try to put smoking completely. History of bilateral pulmonary embolus. This occurred in June 2016. Patient was managed with Xarelto for oral anticoagulation up until recently which was stopped by his hematologist/oncologist.    GIST tumor. Patient was in the process of being started on oral chemotherapy with plan for surgical resection once the tumor shrinks in size. Followup in 4-5 months, sooner if needed.

## 2021-02-01 NOTE — PROGRESS NOTES
Dennis Pereira presents today for   Chief Complaint   Patient presents with    Follow-up     6 month follow up       Dennis Pereira preferred language for health care discussion is english/other. Is someone accompanying this pt? no    Is the patient using any DME equipment during [address]? no    Depression Screening:  3 most recent PHQ Screens 2/1/2021   Little interest or pleasure in doing things Not at all   Feeling down, depressed, irritable, or hopeless Not at all   Total Score PHQ 2 0       Learning Assessment:  Learning Assessment 2/1/2021   PRIMARY LEARNER Patient   PRIMARY LANGUAGE ENGLISH   LEARNER PREFERENCE PRIMARY DEMONSTRATION   ANSWERED BY patient   RELATIONSHIP SELF       Abuse Screening:  Abuse Screening Questionnaire 2/1/2021   Do you ever feel afraid of your partner? N   Are you in a relationship with someone who physically or mentally threatens you? N   Is it safe for you to go home? Y       Fall Risk  Fall Risk Assessment, last 12 mths 2/1/2021   Able to walk? Yes   Fall in past 12 months? 0   Do you feel unsteady? 0   Are you worried about falling 0       Pt currently taking Anticoagulant therapy? no    Coordination of Care:  1. Have you been to the ER, urgent care clinic since your last visit? Hospitalized since your last visit? no    2. Have you seen or consulted any other health care providers outside of the [address] [place] [address] since your last visit? Include any pap smears or colon screening.  no

## 2021-02-02 DIAGNOSIS — C49.A0 GASTROINTESTINAL STROMAL TUMOR (HCC): ICD-10-CM

## 2021-02-02 RX ORDER — IMATINIB MESYLATE 400 MG/1
TABLET, FILM COATED ORAL
Qty: 30 TAB | Refills: 4 | Status: SHIPPED | OUTPATIENT
Start: 2021-02-02 | End: 2021-06-16 | Stop reason: SDUPTHER

## 2021-02-08 ENCOUNTER — TELEPHONE (OUTPATIENT)
Dept: ONCOLOGY | Age: 56
End: 2021-02-08

## 2021-02-08 DIAGNOSIS — C49.A0 GASTROINTESTINAL STROMAL TUMOR (HCC): ICD-10-CM

## 2021-02-08 DIAGNOSIS — C49.A0 GASTROINTESTINAL STROMAL TUMOR (HCC): Primary | ICD-10-CM

## 2021-02-08 RX ORDER — IMATINIB MESYLATE 400 MG/1
TABLET, FILM COATED ORAL
Qty: 30 TAB | Refills: 3 | Status: SHIPPED | OUTPATIENT
Start: 2021-02-08 | End: 2021-02-23 | Stop reason: SDUPTHER

## 2021-02-08 RX ORDER — METOPROLOL TARTRATE 25 MG/1
TABLET, FILM COATED ORAL
Qty: 180 TAB | Refills: 3 | Status: SHIPPED | OUTPATIENT
Start: 2021-02-08 | End: 2022-02-03 | Stop reason: SDUPTHER

## 2021-02-08 RX ORDER — IMATINIB MESYLATE 400 MG/1
TABLET, FILM COATED ORAL
Qty: 30 TAB | Refills: 3 | Status: SHIPPED | OUTPATIENT
Start: 2021-02-08 | End: 2021-02-08 | Stop reason: SDUPTHER

## 2021-02-20 NOTE — PROGRESS NOTES
Hematology/Oncology  Progress Note    Name: Washington Cordova  Date: 2021  : 1965    Maricela Harada, PA     Mr. Altaf Dave is a 60y.o. year old male who was seen for recurrent GIST. Hx GIST small bowel cancer removed 2010, s.p Gleevec    Current therapy: Imatinib at 400 mg daily. Subjective:     Mr. Altaf Dave is a 68-year-old man who was diagnosed with a pulmonary embolus in May of 2016. Patient was being followed by Dr. Katelyn Aguilera who retired. He states that he is taking Xarelto 20mg PO daily. He admits smoking 1 ppd from 1 /2ppd and states he is willing to cut back some more until he totally quits. He went to abdominal wall biopsy recently which confirmed recurrent GIST. He has hx of GIST in . He reported he was taking Gleevec for about one year after surgery at that time. He has started Gleevec since last week. He has episodes of chronic cluster headache which occurred every years. He stated his tumors seemly not larger than before. He denied significant pain or vomiting or altered bowel movements. He is an active smoker. He did not receive his phlebotomy for months for his polycythemia. The patient otherwise has no other complaints. Denied fever, chills, night sweat, unintentional weight loss, skin lumps or bumps, acute bleeding or bruising issues. No acute bleeding, blood in stool, dark stool, melena, hematochezia, hemoptysis, dark urine, or easily bruising. Denied headache, acute vision change, dizziness, chest pain, worsen shortness of breath, palpitation, productive cough, nausea, vomiting, abdominal pain, altered bowel habits, dysuria, new bone pain or back pain, focal numbness or weakness. Past medical history, family history, and social history: these were reviewed and remains unchanged.     Past Medical History:   Diagnosis Date    CAD (coronary artery disease)     Carpal tunnel syndrome     COPD     Depression     H/O echocardiogram 2017    EF 35-40%, mild LVH, biatrial enlargement, mild TR, RVSP 41    Hemorrhoids     History of esophagitis     History of malignant gastrointestinal stromal tumor (GIST)     Hypercholesterolemia     Hypertension     Myocardial infarction [facility])     Pulmonary embolus [facility]) June 2016    Bilateral    S/P CABG x 3 2009    LIMA to LAD, SVG to RPDA, SVG to diagonal    S/P cardiac catheterization November 2011    Patent GALLAGHER to LAD, patent SVG to diet, and occluded SVG to RPDA,  native LAD 85% proximal stenosis, left circumflex 20% diffuse disease, RCA distal 70% stenosis,, EF 45%    Secondary polycythemia      Past Surgical History:   Procedure Laterality Date    HX CORONARY STENT PLACEMENT      HX CYST REMOVAL      HX GI      tumor near stomach    HX HEENT      deviated septum    HX HEMORRHOIDECTOMY      HX ORTHOPAEDIC      knee injury right    HX OTHER SURGICAL      resection of mesenteric mass    CT CARDIAC SURG PROCEDURE UNLIST      stent     Social History     Socioeconomic History    Marital status: SINGLE     Spouse name: Not on file    Number of children: Not on file    Years of education: Not on file    Highest education level: Not on file   Occupational History    Not on file   Social Needs    Financial resource strain: Not on file    Food insecurity     Worry: Not on file     Inability: Not on file    Transportation needs     Medical: Not on file     Non-medical: Not on file   Tobacco Use    Smoking status: Current Every Day Smoker     Packs/day: 1.50    Smokeless tobacco: Never Used   Substance and Sexual Activity    Alcohol use: Yes     Frequency: Monthly or less     Comment: \"I haven't drank in 2 months, but I was drinking about 12 pk beer per week\"     Drug use: No    Sexual activity: Not on file   Lifestyle    Physical activity     Days per week: Not on file     Minutes per session: Not on file    Stress: Not on file   Relationships    Social connections     Talks on phone: Not on file Gets together: Not on file     Attends Sikhism service: Not on file     Active member of club or organization: Not on file     Attends meetings of clubs or organizations: Not on file     Relationship status: Not on file    Intimate partner violence     Fear of current or ex partner: Not on file     Emotionally abused: Not on file     Physically abused: Not on file     Forced sexual activity: Not on file   Other Topics Concern    Not on file   Social History Narrative    Not on file     History reviewed. No pertinent family history. Current Outpatient Medications   Medication Sig Dispense Refill    metoprolol tartrate (LOPRESSOR) 25 mg tablet take 1 tablet by mouth twice a day 180 Tab 3    imatinib (GLEEVEC) 400 mg tablet Take one tab by mouth daily with a large glass of water. 30 Tab 4    aspirin (ASPIRIN) 325 mg tablet Take 325 mg by mouth daily.  hydroCHLOROthiazide (HYDRODIURIL) 25 mg tablet take 1 tablet by mouth once daily 30 Tab 6    ramipriL (ALTACE) 10 mg capsule take 1 capsule by mouth daily 90 Cap 3    traZODone (DESYREL) 150 mg tablet Take 150 mg by mouth nightly. Review of Systems   Constitutional: Negative for chills, diaphoresis, fever, malaise/fatigue and weight loss. Respiratory: Negative for cough, hemoptysis, shortness of breath and wheezing. Cardiovascular: Negative for chest pain, palpitations and leg swelling. Gastrointestinal: Positive for nausea. Negative for abdominal pain, diarrhea, heartburn and vomiting. Genitourinary: Negative for dysuria, frequency, hematuria and urgency. Musculoskeletal: Negative for joint pain and myalgias. Skin: Negative for itching and rash. Neurological: Positive for headaches. Negative for dizziness, seizures and weakness. Psychiatric/Behavioral: Negative for depression. The patient does not have insomnia.              Objective:     Visit Vitals  /79   Pulse (!) 55   Resp 18   Ht 5' 11\" (1.803 m)   Wt 98 kg (216 lb) SpO2 100%   BMI 30.13 kg/m²       ECOG Performance Status (grade): 1  0 - able to carry on all pre-disease activity w/out restriction  1 - restricted but able to carry out light work  2 - ambulatory and can self- care but unable to carry out work  3 - bed or chair >50% of waking hours  4 - completely disable, total care, confined to bed or chair    Physical Exam  Constitutional:       General: He is not in acute distress. HENT:      Head: Normocephalic and atraumatic. Eyes:      Pupils: Pupils are equal, round, and reactive to light. Neck:      Musculoskeletal: Neck supple. No neck rigidity. Cardiovascular:      Pulses: Normal pulses. Heart sounds: Normal heart sounds. No murmur. Pulmonary:      Effort: Pulmonary effort is normal. No respiratory distress. Breath sounds: Normal breath sounds. Abdominal:      General: Bowel sounds are normal. There is no distension. Palpations: Abdomen is soft. There is mass (multiple abdominal wall masses ). Tenderness: There is no abdominal tenderness. There is no guarding. Musculoskeletal:         General: No swelling or tenderness. Lymphadenopathy:      Cervical: No cervical adenopathy. Skin:     General: Skin is warm. Findings: No rash. Neurological:      Mental Status: He is alert and oriented to person, place, and time. Mental status is at baseline. Cranial Nerves: No cranial nerve deficit. Psychiatric:         Mood and Affect: Mood normal.          Diagnostics:      No results found for this or any previous visit (from the past 96 hour(s)). Imaging:  No results found for this or any previous visit. Results for orders placed during the hospital encounter of 05/31/16   XR SWALLOW FUNC VIDEO    Narrative Modified barium swallow with video recording:        INDICATION:    Dysphagia.         The study has been performed under supervision of speech therapist.    The patient swallowed thin liquid but there is been deep penetration almost to  the level of vocal cord. Then patient swallowed thin liquid food with small sips  and with chin-tucked position. At  this time, there has been no penetration or  aspiration of thin liquid into larynx. Then patient swallowed barium tablet. There has    been penetration of thin liquid used for swallowing tablet into larynx. There  is been no evidence of stagnation of tablet in hypopharynx or in upper  esophagus. Patient swallowed puree and cracker normally without penetration or  nasopharyngeal into larynx. Fluoroscopy time utilized is 48 seconds. No fluoroscopy images is obtained. Impression IMPRESSION:    At first there is penetration of thin liquid into larynx as described. But when  the patient swallowed thin liquid in small sips with chin tuck position, there  is been no penetration or aspiration into larynx. Patient swallowed barium tablet but there has  been penetration of the liquid  used with a tablet. Swallowing of puree and cracker has been normal.         Results for orders placed during the hospital encounter of 12/03/20   CT BX ABD MASS NDL PERC    Narrative PREOPERATIVE DIAGNOSIS: Liver mass. POSTOPERATIVE DIAGNOSIS: Same    INDICATION: Metastatic GI stromal tumor. (s)  Resident: Roque Patel, PGY-5  Attending: Ramo Felton MD who was present for the entire procedure. PROCEDURES: CT-guided core needle biopsy of the right hepatic lobe mass. Anesthesia:   Sedation: Moderate sedation with versed and fentanyl. Anesthesia given and  monitored per qualified, independently trained interventional radiology nurse  under my direct supervision. Please see detailed nursing records for medication  dosing. .    Sedation time: 20 minutes. Local: 1% lidocaine. Radiation dose:   Dose length product = 1679 mGy-cm.   One or more dose reduction techniques were used on this CT: automated exposure  control, adjustment of the mAs and/or kVp according to patient's size, and  iterative reconstruction techniques. The specific techniques utilized on this CT  exam have been documented in the patient's electronic medical record. CONTRAST: None. COMPLICATIONS: None    DRAIN: No    CATHETER: None. EBL: Minimal.    SPECIMEN: 8 core biopsy specimens from the abdominal wall mass were obtained. Specimens were given to pathology on site. Sampling was adequate. TECHNIQUE:   After detailed explanation of risks and benefits of the procedure verbal and  written consent were obtained. Patient was brought to the CT room and placed  supine on the table. Timeout was performed.  view of the abdomen was  obtained. Target lesion was identified and skin was marked. The abdomen was  prepped and draped in usual sterile fashion. Maximum sterile barrier technique  was used. 1% lidocaine solution was instilled in the skin superficial and deep  subcutaneous soft tissues overlying the biopsy region. Under direct CT fluoroscopy guidance using 18-gauge [employer] core biopsy  needle was advanced down through the guide into the lesion. 8 passes were made  and core biopsies were given to pathology on site. Specimen was adequate per  pathology. Needle was removed. Postbiopsy imaging was obtained. Sterile dressing  was applied. Hemostasis was achieved with manual compression and Gelfoam slurry. There were no immediate complications. Patient tolerated procedure fairly well. Patient was transferred to recovery in stable condition. FINDINGS: Anterior abdominal wall lesion shows biopsy. CT fluoroscopic guidance demonstrated good position of the biopsy needle. Postbiopsy imaging demonstrated no evidence of bleeding. Impression IMPRESSION:    Successful, uncomplicated CT-guided abdominal wall mass core needle biopsy. PATHOLOGY  12/3/2020 ANTERIOR ABDOMINAL WALL MASS, BIOPSY:   GASTROINTESTINAL STROMAL TUMOR.    GASTROINTESTINAL STROMAL TUMOR (GIST): Biopsy   SPECIMEN   Procedure: Core needle biopsy   TUMOR   Tumor Site: Anterior abdominal wall   Histologic Type: Gastrointestinal stromal tumor, spindle cell type   Histologic Grade: G1: Low grade; mitotic rate <= 5 / 5 mm2   Mitotic Rate: 1 mitoses per 5 mm2   Necrosis: Not identified   Risk Assessment: Cannot be determined: Recurrent   Treatment Effect: No known prebiopsy therapy     DIAGNOSIS COMMENT:   Given the patient's history of gastrointestinal stromal tumor, the findings are consistent with recurrent disease. Assessment:     1. Gastrointestinal stromal tumor ([address]    2. Abdominal mass, unspecified abdominal location    3. Pulmonary embolism without acute cor pulmonale, unspecified chronicity, unspecified pulmonary embolism type ([address]    4. Erythrocytosis    5. Tobacco use        Plan:     Recurrent GIST  Abdominal wall mass  History of GIST 2010:  -- History of GIST, Dx 2010. S.p adjuvant Imatinib for about 1 year reportedly. -- 9/18/2020 CT reported extensive abdominal and pelvic mesenteric masses with some masses directly extending and penetrating through the anterior abdominal wall reflective of the given history of bulge. There are some regions of benign herniation but the vast majority is malignant. Findings may be primary and/or metastatic and within the spectrum of carcinomatosis. Left adrenal nodule new since prior exam. Metastasis is not excluded. Stable right adrenal nodule. Stable hepatic hypodensities. -- 12/3/2020 Abdominal wall biopsy confirmed recurrent GIST.  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 Due to granular background staining, interpretation of PDL 1 immunohistochemical stain is not possible. -- He has seen Dr. Mauro Flowers for evaluation of resection.  I have discussed with Dr. Dodie Hammans who suggested to start neoadjuvant TKIs prior to surgical resection. He was not interested in XRT at this point. 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 -- Since 2/16/2021 he was started Imatinib at 400 mg daily. He has tolerated therapy without high graded toxicities. Labs 1/26/2021 reviewed. Plan:  -- He will continue on Imatinib at 400 mg daily. -- Labs today and monthly: CBC, CMP  -- Plan to repeat Scans in 1-2 months for interval imaging.  -- He will RTC 4 weeks for follows-up. Always sooner if required. Cluster headache  -- He has been on Topamax which helps. -- F/u PCP    Pulmonary embolus  -- He was diagnosed with one-time pulmonary embolus in May of 2016. Patient was being followed by Dr. Wilma Jerry who retired recently. He states that he is taking Xarelto 20mg PO daily since then. Thrombophilia w/u was negative reportedly. -- 10/12/2020 CTA reported no evidence of pulmonary embolism. -- Xarelto was d/c. Patient was advised to continue on ASA 81 mg for thromboprophylaxis. Polycythemia  -- He is an active smoker. He did not receive his phlebotomy for months for his p-olycythemia. -- Negative (wild type) for the JAK2 [id number].   -- His polycythemia was likely secondary 2/2 smoking, COPD. Recent CBC on 7/2020 showed improving H/H, 14/42. 4. I have counseled him on smoking cessation. -- Monitor CBC      Orders Placed This Encounter    METABOLIC PANEL, COMPREHENSIVE     Standing Status:   Future     Number of Occurrences:   1     Standing Expiration Date:   2/24/2022    CBC WITH AUTOMATED DIFF     Standing Status:   Future     Number of Occurrences:   1     Standing Expiration Date:   2/24/2022              All of patient's questions answered to their apparent satisfaction. They verbally show understanding and agreement with aforementioned plan.          John Diez MD          About 25 minutes were spent for this encounter with more than 50% of the time spent in face-to-face counseling, discussing on diagnosis and management plan going forward, and co-ordination of care. Parts of this document has been produced using Dragon dictation system. Unrecognized errors in transcription may be present. Please do not hesitate to reach out for any questions or clarifications.       CC: RADHA Crump; Maribell Oscar M.D.

## 2021-02-23 ENCOUNTER — OFFICE VISIT (OUTPATIENT)
Dept: ONCOLOGY | Age: 56
End: 2021-02-23
Payer: COMMERCIAL

## 2021-02-23 VITALS
RESPIRATION RATE: 18 BRPM | OXYGEN SATURATION: 100 % | SYSTOLIC BLOOD PRESSURE: 121 MMHG | DIASTOLIC BLOOD PRESSURE: 79 MMHG | HEIGHT: 71 IN | BODY MASS INDEX: 30.24 KG/M2 | HEART RATE: 55 BPM | WEIGHT: 216 LBS

## 2021-02-23 DIAGNOSIS — I26.99 PULMONARY EMBOLISM WITHOUT ACUTE COR PULMONALE, UNSPECIFIED CHRONICITY, UNSPECIFIED PULMONARY EMBOLISM TYPE (HCC): ICD-10-CM

## 2021-02-23 DIAGNOSIS — Z72.0 TOBACCO USE: ICD-10-CM

## 2021-02-23 DIAGNOSIS — D75.1 ERYTHROCYTOSIS: ICD-10-CM

## 2021-02-23 DIAGNOSIS — C49.A0 GASTROINTESTINAL STROMAL TUMOR (HCC): Primary | ICD-10-CM

## 2021-02-23 DIAGNOSIS — R19.00 ABDOMINAL MASS, UNSPECIFIED ABDOMINAL LOCATION: ICD-10-CM

## 2021-02-23 PROCEDURE — 99214 OFFICE O/P EST MOD 30 MIN: CPT | Performed by: INTERNAL MEDICINE

## 2021-03-17 ENCOUNTER — TELEPHONE (OUTPATIENT)
Dept: ONCOLOGY | Age: 56
End: 2021-03-17

## 2021-03-17 NOTE — TELEPHONE ENCOUNTER
Patient contacted office to say he missed his lab appt this morning due to being sick, patient is vomiting, body aches etc. I have advised patient to contact the office once he feels better we will get his labs drawn most likely on Monday 2/22/21. He was understanding.

## 2021-03-19 NOTE — PROGRESS NOTES
Hematology/Oncology  Progress Note    Name: Robina Ward  Date: 3/24/2021  : 1965    RADHA Flores     Mr. Katie Chowdhury is a 60y.o. year old male who was seen for recurrent GIST. Hx GIST small bowel cancer removed 2010, s.p Gleevec    Current therapy: Imatinib at 400 mg daily. Subjective:     Mr. Katie Chowdhury is a 61-year-old man who was diagnosed with a pulmonary embolus in May of 2016. Patient was being followed by Dr. Nereyda Galo who retired. He states that he is taking Xarelto 20mg PO daily. He admits smoking 1 ppd from 1 /2ppd and states he is willing to cut back some more until he totally quits. He went to abdominal wall biopsy recently which confirmed recurrent GIST. He has hx of GIST in . He reported he was taking Gleevec for about one year after surgery at that time. He has started [facility] since 2021. He has episodes of chronic cluster headache which worsen with Gleevec. He reported his headache responded with a short course of prednisone previously. He would like to repeat Prednisone course for headache. He stated his tumors seemly are getting smaller. He denied significant pain or vomiting or altered bowel movements. He is an active smoker. He did not receive his phlebotomy for months for his polycythemia. The patient otherwise has no other complaints. Denied fever, chills, night sweat, unintentional weight loss, skin lumps or bumps, acute bleeding or bruising issues. No acute bleeding, blood in stool, dark stool, melena, hematochezia, hemoptysis, dark urine, or easily bruising. Denied headache, acute vision change, dizziness, chest pain, worsen shortness of breath, palpitation, productive cough, vomiting, abdominal pain, altered bowel habits, dysuria, new bone pain or back pain, focal numbness or weakness. Past medical history, family history, and social history: these were reviewed and remains unchanged.     Past Medical History:   Diagnosis Date    CAD (coronary artery disease)     Carpal tunnel syndrome     COPD     Depression     H/O echocardiogram 11/2017    EF 35-40%, mild LVH, biatrial enlargement, mild TR, RVSP 41    Hemorrhoids     History of esophagitis     History of malignant gastrointestinal stromal tumor (GIST)     Hypercholesterolemia     Hypertension     Myocardial infarction [facility])     Pulmonary embolus [facility]) June 2016    Bilateral    S/P CABG x 3 2009    LIMA to LAD, SVG to RPDA, SVG to diagonal    S/P cardiac catheterization November 2011    Patent GALLAGHER to LAD, patent SVG to diet, and occluded SVG to RPDA,  native LAD 85% proximal stenosis, left circumflex 20% diffuse disease, RCA distal 70% stenosis,, EF 45%    Secondary polycythemia      Past Surgical History:   Procedure Laterality Date    HX CORONARY STENT PLACEMENT      HX CYST REMOVAL      HX GI      tumor near stomach    HX HEENT      deviated septum    HX HEMORRHOIDECTOMY      HX ORTHOPAEDIC      knee injury right    HX OTHER SURGICAL      resection of mesenteric mass    MO CARDIAC SURG PROCEDURE UNLIST      stent     Social History     Socioeconomic History    Marital status: SINGLE     Spouse name: Not on file    Number of children: Not on file    Years of education: Not on file    Highest education level: Not on file   Occupational History    Not on file   Social Needs    Financial resource strain: Not on file    Food insecurity     Worry: Not on file     Inability: Not on file    Transportation needs     Medical: Not on file     Non-medical: Not on file   Tobacco Use    Smoking status: Current Every Day Smoker     Packs/day: 1.50    Smokeless tobacco: Never Used   Substance and Sexual Activity    Alcohol use: Yes     Frequency: Monthly or less     Comment: \"I haven't drank in 2 months, but I was drinking about 12 pk beer per week\"     Drug use: No    Sexual activity: Not on file   Lifestyle    Physical activity     Days per week: Not on file Minutes per session: Not on file    Stress: Not on file   Relationships    Social connections     Talks on phone: Not on file     Gets together: Not on file     Attends [employer] service: Not on file     Active member of club or organization: Not on file     Attends meetings of clubs or organizations: Not on file     Relationship status: Not on file    Intimate partner violence     Fear of current or ex partner: Not on file     Emotionally abused: Not on file     Physically abused: Not on file     Forced sexual activity: Not on file   Other Topics Concern    Not on file   Social History Narrative    Not on file     No family history on file. Current Outpatient Medications   Medication Sig Dispense Refill    predniSONE (DELTASONE) 10 mg tablet Take six pills on Day 1 to Day 3, five pills on Day 4, four pills on Day 5, three pills on Day 6, two pills on Day 7, and one pill on Day 8. 33 Tab 0    ondansetron hcl (Zofran) 4 mg tablet Take 1 Tab by mouth every six (6) hours as needed for Nausea or Vomiting for up to 15 days. 45 Tab 1    metoprolol tartrate (LOPRESSOR) 25 mg tablet take 1 tablet by mouth twice a day 180 Tab 3    imatinib (GLEEVEC) 400 mg tablet Take one tab by mouth daily with a large glass of water. 30 Tab 4    aspirin (ASPIRIN) 325 mg tablet Take 325 mg by mouth daily.  hydroCHLOROthiazide (HYDRODIURIL) 25 mg tablet take 1 tablet by mouth once daily 30 Tab 6    ramipriL (ALTACE) 10 mg capsule take 1 capsule by mouth daily 90 Cap 3    traZODone (DESYREL) 150 mg tablet Take 150 mg by mouth nightly. Review of Systems   Constitutional: Negative for chills, diaphoresis, fever, malaise/fatigue and weight loss. Respiratory: Negative for cough, hemoptysis, shortness of breath and wheezing. Cardiovascular: Negative for chest pain, palpitations and leg swelling. Gastrointestinal: Positive for nausea. Negative for abdominal pain, diarrhea, heartburn and vomiting.    Genitourinary: Negative for dysuria, frequency, hematuria and urgency. Musculoskeletal: Negative for joint pain and myalgias. Skin: Negative for itching and rash. Neurological: Positive for headaches. Negative for dizziness, seizures and weakness. Psychiatric/Behavioral: Negative for depression. The patient does not have insomnia. Objective:     Visit Vitals  BP (!) 140/87   Pulse (!) 59   Temp 97.8 °F (36.6 °C)   Ht 5' 11\" (1.803 m)   Wt 97.5 kg (215 lb)   SpO2 100%   BMI 29.99 kg/m²       ECOG Performance Status (grade): 1  0 - able to carry on all pre-disease activity w/out restriction  1 - restricted but able to carry out light work  2 - ambulatory and can self- care but unable to carry out work  3 - bed or chair >50% of waking hours  4 - completely disable, total care, confined to bed or chair    Physical Exam  Constitutional:       General: He is not in acute distress. HENT:      Head: Normocephalic and atraumatic. Eyes:      Pupils: Pupils are equal, round, and reactive to light. Neck:      Musculoskeletal: Neck supple. No neck rigidity. Cardiovascular:      Pulses: Normal pulses. Heart sounds: Normal heart sounds. No murmur. Pulmonary:      Effort: Pulmonary effort is normal. No respiratory distress. Breath sounds: Normal breath sounds. Abdominal:      General: Bowel sounds are normal. There is no distension. Palpations: Abdomen is soft. There is mass (multiple abdominal wall masses ). Tenderness: There is no abdominal tenderness. There is no guarding. Musculoskeletal:         General: No swelling or tenderness. Lymphadenopathy:      Cervical: No cervical adenopathy. Skin:     General: Skin is warm. Findings: No rash. Neurological:      Mental Status: He is alert and oriented to person, place, and time. Mental status is at baseline. Cranial Nerves: No cranial nerve deficit.    Psychiatric:         Mood and Affect: Mood normal.          Diagnostics:      No results found for this or any previous visit (from the past 96 hour(s)). Imaging:  No results found for this or any previous visit. Results for orders placed during the hospital encounter of 05/31/16   XR SWALLOW FUNC VIDEO    Narrative Modified barium swallow with video recording:        INDICATION:    Dysphagia. The study has been performed under supervision of speech therapist.    The patient swallowed thin liquid but there is been deep penetration almost to  the level of vocal cord. Then patient swallowed thin liquid food with small sips  and with chin-tucked position. At  this time, there has been no penetration or  aspiration of thin liquid into larynx. Then patient swallowed barium tablet. There has    been penetration of thin liquid used for swallowing tablet into larynx. There  is been no evidence of stagnation of tablet in hypopharynx or in upper  esophagus. Patient swallowed puree and cracker normally without penetration or  nasopharyngeal into larynx. Fluoroscopy time utilized is 48 seconds. No fluoroscopy images is obtained. Impression IMPRESSION:    At first there is penetration of thin liquid into larynx as described. But when  the patient swallowed thin liquid in small sips with chin tuck position, there  is been no penetration or aspiration into larynx. Patient swallowed barium tablet but there has  been penetration of the liquid  used with a tablet. Swallowing of puree and cracker has been normal.         Results for orders placed during the hospital encounter of 12/03/20   CT BX ABD MASS NDL PERC    Narrative PREOPERATIVE DIAGNOSIS: Liver mass. POSTOPERATIVE DIAGNOSIS: Same    INDICATION: Metastatic GI stromal tumor. (s)  Resident: Ravi Chappell PGY-5  Attending: Robb Bartholomew MD who was present for the entire procedure. PROCEDURES: CT-guided core needle biopsy of the right hepatic lobe mass.     Anesthesia:   Sedation: Moderate sedation with versed and fentanyl. Anesthesia given and  monitored per qualified, independently trained interventional radiology nurse  under my direct supervision. Please see detailed nursing records for medication  dosing. .    Sedation time: 20 minutes. Local: 1% lidocaine. Radiation dose:   Dose length product = 1679 mGy-cm. One or more dose reduction techniques were used on this CT: automated exposure  control, adjustment of the mAs and/or kVp according to patient's size, and  iterative reconstruction techniques. The specific techniques utilized on this CT  exam have been documented in the patient's electronic medical record. CONTRAST: None. COMPLICATIONS: None    DRAIN: No    CATHETER: None. EBL: Minimal.    SPECIMEN: 8 core biopsy specimens from the abdominal wall mass were obtained. Specimens were given to pathology on site. Sampling was adequate. TECHNIQUE:   After detailed explanation of risks and benefits of the procedure verbal and  written consent were obtained. Patient was brought to the CT room and placed  supine on the table. Timeout was performed.  view of the abdomen was  obtained. Target lesion was identified and skin was marked. The abdomen was  prepped and draped in usual sterile fashion. Maximum sterile barrier technique  was used. 1% lidocaine solution was instilled in the skin superficial and deep  subcutaneous soft tissues overlying the biopsy region. Under direct CT fluoroscopy guidance using 18-gauge [place] core biopsy  needle was advanced down through the guide into the lesion. 8 passes were made  and core biopsies were given to pathology on site. Specimen was adequate per  pathology. Needle was removed. Postbiopsy imaging was obtained. Sterile dressing  was applied. Hemostasis was achieved with manual compression and Gelfoam slurry. There were no immediate complications. Patient tolerated procedure fairly well.   Patient was transferred to recovery in stable condition. FINDINGS: Anterior abdominal wall lesion shows biopsy. CT fluoroscopic guidance demonstrated good position of the biopsy needle. Postbiopsy imaging demonstrated no evidence of bleeding. Impression IMPRESSION:    Successful, uncomplicated CT-guided abdominal wall mass core needle biopsy. PATHOLOGY  12/3/2020 ANTERIOR ABDOMINAL WALL MASS, BIOPSY:   GASTROINTESTINAL STROMAL TUMOR. GASTROINTESTINAL STROMAL TUMOR (GIST): Biopsy   SPECIMEN   Procedure: Core needle biopsy   TUMOR   Tumor Site: Anterior abdominal wall   Histologic Type: Gastrointestinal stromal tumor, spindle cell type   Histologic Grade: G1: Low grade; mitotic rate <= 5 / 5 mm2   Mitotic Rate: 1 mitoses per 5 mm2   Necrosis: Not identified   Risk Assessment: Cannot be determined: Recurrent   Treatment Effect: No known prebiopsy therapy     DIAGNOSIS COMMENT:   Given the patient's history of gastrointestinal stromal tumor, the findings are consistent with recurrent disease. Assessment:     1. Gastrointestinal stromal tumor ([address]    2. Abdominal mass, unspecified abdominal location    3. CINV (chemotherapy-induced nausea and vomiting)    4. Pulmonary embolism without acute cor pulmonale, unspecified chronicity, unspecified pulmonary embolism type ([address]    5. Pulmonary embolism, other, unspecified chronicity, unspecified whether acute cor pulmonale present ([address]    6. Tobacco use        Plan:     Recurrent GIST  Abdominal wall mass  History of GIST 2010:  -- History of GIST, Dx 2010. S.p adjuvant Imatinib for about 1 year reportedly. -- 9/18/2020 CT reported extensive abdominal and pelvic mesenteric masses with some masses directly extending and penetrating through the anterior abdominal wall reflective of the given history of bulge. There are some regions of benign herniation but the vast majority is malignant. Findings may be primary and/or metastatic and within the spectrum of carcinomatosis.  Left adrenal nodule new since prior exam. Metastasis is not excluded. Stable right adrenal nodule. Stable hepatic hypodensities. -- 12/3/2020 Abdominal wall biopsy confirmed recurrent GIST.  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 Due to granular background staining, interpretation of PDL 1 immunohistochemical stain is not possible. -- He has seen Dr. Noy Garcia for evaluation of resection. I have discussed with Dr. Gary Russell who suggested to start neoadjuvant TKIs prior to surgical resection. He was not interested in XRT at this point. 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 -- Since 2/16/2021 he was started Imatinib at 400 mg daily. He has tolerated therapy without high graded toxicities. He reported his tumors became smaller with therapy. Recent labs 2/23/2021 reviewed. Plan:  -- He will continue on Imatinib at 400 mg daily. -- Labs today and monthly: CBC, CMP  -- We will obtain CT C/A/P for interval imaging.  -- He will RTC 4 weeks for follows-up. Always sooner if required. Cluster headache  -- He has been on Topamax; however concerned of side effects. -- He requested another course of prednisone. -- We will refill short course of prednisone today  -- Advised to f/u PCP for long term control. CNIV  -- Zofran PRN      Pulmonary embolus  -- He was diagnosed with one-time pulmonary embolus in May of 2016. Patient was being followed by Dr. Power Guillen who retired recently. He states that he is taking Xarelto 20mg PO daily since then. Thrombophilia w/u was negative reportedly. -- 10/12/2020 CTA reported no evidence of pulmonary embolism. -- Xarelto was d/c. Patient was advised to continue on ASA 81 mg for thromboprophylaxis. Polycythemia  -- He is an active smoker.  He did not receive his phlebotomy for months for his polycythemia. Negative (wild type) for the JAK2 [id number].   -- His polycythemia was likely secondary 2/2 smoking, COPD. Recent CBC on 7/2020 showed improving H/H, 14/42. 4. I have counseled him on smoking cessation. -- Monitor CBC periodically      Orders Placed This Encounter    METABOLIC PANEL, COMPREHENSIVE     Standing Status:   Future     Standing Expiration Date:   3/25/2022    CBC WITH AUTOMATED DIFF     Standing Status:   Future     Standing Expiration Date:   3/25/2022    predniSONE (DELTASONE) 10 mg tablet     Sig: Take six pills on Day 1 to Day 3, five pills on Day 4, four pills on Day 5, three pills on Day 6, two pills on Day 7, and one pill on Day 8. Dispense:  33 Tab     Refill:  0    ondansetron hcl (Zofran) 4 mg tablet     Sig: Take 1 Tab by mouth every six (6) hours as needed for Nausea or Vomiting for up to 15 days. Dispense:  45 Tab     Refill:  1              All of patient's questions answered to their apparent satisfaction. They verbally show understanding and agreement with aforementioned plan. Rosaria Cooks, MD          About 25 minutes were spent for this encounter with more than 50% of the time spent in face-to-face counseling, discussing on diagnosis and management plan going forward, and co-ordination of care. Parts of this document has been produced using Dragon dictation system. Unrecognized errors in transcription may be present. Please do not hesitate to reach out for any questions or clarifications.       CC: Nyle Schwab, PA;

## 2021-03-24 ENCOUNTER — OFFICE VISIT (OUTPATIENT)
Dept: ONCOLOGY | Age: 56
End: 2021-03-24
Payer: COMMERCIAL

## 2021-03-24 VITALS
SYSTOLIC BLOOD PRESSURE: 140 MMHG | HEART RATE: 59 BPM | WEIGHT: 215 LBS | TEMPERATURE: 97.8 F | DIASTOLIC BLOOD PRESSURE: 87 MMHG | BODY MASS INDEX: 30.1 KG/M2 | OXYGEN SATURATION: 100 % | HEIGHT: 71 IN

## 2021-03-24 DIAGNOSIS — I26.99 PULMONARY EMBOLISM, OTHER, UNSPECIFIED CHRONICITY, UNSPECIFIED WHETHER ACUTE COR PULMONALE PRESENT (HCC): ICD-10-CM

## 2021-03-24 DIAGNOSIS — I26.99 PULMONARY EMBOLISM WITHOUT ACUTE COR PULMONALE, UNSPECIFIED CHRONICITY, UNSPECIFIED PULMONARY EMBOLISM TYPE (HCC): ICD-10-CM

## 2021-03-24 DIAGNOSIS — R11.2 CINV (CHEMOTHERAPY-INDUCED NAUSEA AND VOMITING): ICD-10-CM

## 2021-03-24 DIAGNOSIS — C49.A0 GASTROINTESTINAL STROMAL TUMOR (HCC): ICD-10-CM

## 2021-03-24 DIAGNOSIS — R19.00 ABDOMINAL MASS, UNSPECIFIED ABDOMINAL LOCATION: ICD-10-CM

## 2021-03-24 DIAGNOSIS — C49.A0 GASTROINTESTINAL STROMAL TUMOR (HCC): Primary | ICD-10-CM

## 2021-03-24 DIAGNOSIS — Z72.0 TOBACCO USE: ICD-10-CM

## 2021-03-24 DIAGNOSIS — T45.1X5A CINV (CHEMOTHERAPY-INDUCED NAUSEA AND VOMITING): ICD-10-CM

## 2021-03-24 PROCEDURE — 99214 OFFICE O/P EST MOD 30 MIN: CPT | Performed by: INTERNAL MEDICINE

## 2021-03-24 RX ORDER — ONDANSETRON 4 MG/1
4 TABLET, FILM COATED ORAL
Qty: 45 TAB | Refills: 1 | Status: SHIPPED | OUTPATIENT
Start: 2021-03-24 | End: 2021-04-08

## 2021-03-24 RX ORDER — PREDNISONE 10 MG/1
TABLET ORAL
Qty: 33 TAB | Refills: 0 | Status: SHIPPED | OUTPATIENT
Start: 2021-03-24 | End: 2021-04-07 | Stop reason: SDUPTHER

## 2021-04-07 ENCOUNTER — TELEPHONE (OUTPATIENT)
Dept: ONCOLOGY | Age: 56
End: 2021-04-07

## 2021-04-07 ENCOUNTER — HOSPITAL ENCOUNTER (OUTPATIENT)
Dept: CT IMAGING | Age: 56
Discharge: HOME | End: 2021-04-07
Attending: INTERNAL MEDICINE
Payer: COMMERCIAL

## 2021-04-07 DIAGNOSIS — C49.A0 GASTROINTESTINAL STROMAL TUMOR (HCC): Primary | ICD-10-CM

## 2021-04-07 LAB — CREAT UR-MCNC: 1.2 MG/DL (ref 0.6–1.3)

## 2021-04-07 PROCEDURE — 74011000636 HC RX REV CODE- 636: Performed by: INTERNAL MEDICINE

## 2021-04-07 PROCEDURE — 74177 CT ABD & PELVIS W/CONTRAST: CPT

## 2021-04-07 PROCEDURE — 82565 ASSAY OF CREATININE: CPT

## 2021-04-07 RX ORDER — PREDNISONE 10 MG/1
TABLET ORAL
Qty: 33 TAB | Refills: 0 | Status: SHIPPED | OUTPATIENT
Start: 2021-04-07 | End: 2021-04-27 | Stop reason: SDUPTHER

## 2021-04-07 RX ADMIN — IOPAMIDOL 100 ML: 612 INJECTION, SOLUTION INTRAVENOUS at 09:48

## 2021-04-07 NOTE — TELEPHONE ENCOUNTER
Patient contacted office stating he finished the prednisone and now his headaches are back and very strong x 1 week. He is requesting we send another refill in of the prednisone. Please advise.

## 2021-04-26 RX ORDER — HYDROCHLOROTHIAZIDE 25 MG/1
TABLET ORAL
Qty: 30 TAB | Refills: 6 | Status: SHIPPED | OUTPATIENT
Start: 2021-04-26 | End: 2021-11-22

## 2021-04-27 ENCOUNTER — OFFICE VISIT (OUTPATIENT)
Dept: ONCOLOGY | Age: 56
End: 2021-04-27
Payer: COMMERCIAL

## 2021-04-27 ENCOUNTER — DOCUMENTATION ONLY (OUTPATIENT)
Dept: ONCOLOGY | Age: 56
End: 2021-04-27

## 2021-04-27 VITALS
TEMPERATURE: 98 F | OXYGEN SATURATION: 97 % | WEIGHT: 218 LBS | RESPIRATION RATE: 18 BRPM | BODY MASS INDEX: 30.4 KG/M2 | HEART RATE: 84 BPM | DIASTOLIC BLOOD PRESSURE: 85 MMHG | SYSTOLIC BLOOD PRESSURE: 129 MMHG

## 2021-04-27 DIAGNOSIS — Z72.0 TOBACCO USE: ICD-10-CM

## 2021-04-27 DIAGNOSIS — R11.2 CINV (CHEMOTHERAPY-INDUCED NAUSEA AND VOMITING): ICD-10-CM

## 2021-04-27 DIAGNOSIS — D75.1 ERYTHROCYTOSIS: ICD-10-CM

## 2021-04-27 DIAGNOSIS — R19.00 ABDOMINAL MASS, UNSPECIFIED ABDOMINAL LOCATION: Primary | ICD-10-CM

## 2021-04-27 DIAGNOSIS — I26.99 PULMONARY EMBOLISM WITHOUT ACUTE COR PULMONALE, UNSPECIFIED CHRONICITY, UNSPECIFIED PULMONARY EMBOLISM TYPE (HCC): ICD-10-CM

## 2021-04-27 DIAGNOSIS — C49.A0 GASTROINTESTINAL STROMAL TUMOR (HCC): ICD-10-CM

## 2021-04-27 DIAGNOSIS — T45.1X5A CINV (CHEMOTHERAPY-INDUCED NAUSEA AND VOMITING): ICD-10-CM

## 2021-04-27 PROCEDURE — 99214 OFFICE O/P EST MOD 30 MIN: CPT | Performed by: INTERNAL MEDICINE

## 2021-04-27 RX ORDER — PREDNISONE 10 MG/1
TABLET ORAL
Qty: 33 TAB | Refills: 0 | Status: SHIPPED | OUTPATIENT
Start: 2021-04-27 | End: 2021-05-28 | Stop reason: SDUPTHER

## 2021-04-27 NOTE — PROGRESS NOTES
Hematology/Oncology  Progress Note    Name: Steff Yadav  Date: 2021  : 1965    RADHA Bansal     Mr. Carline Garvin is a 60y.o. year old male who was seen for recurrent GIST. Hx GIST small bowel cancer removed 2010, s.p Gleevec    Current therapy: Imatinib at 400 mg daily. Subjective:     Mr. Carline Garvin is a 56-year-old man who was diagnosed with a pulmonary embolus in May of 2016. Patient was being followed by Dr. Yomaira Cramer who retired. He states that he is taking Xarelto 20mg PO daily. He admits smoking 1 ppd from 1 /2ppd and states he is willing to cut back some more until he totally quits. He went to abdominal wall biopsy recently which confirmed recurrent GIST. He has hx of GIST in . He reported he was taking Gleevec for about one year after surgery at that time. He has started [facility] since 2021. He denied significant pain or vomiting or altered bowel movements. He is an active smoker. He did not receive his phlebotomy for months for his polycythemia. Since last visit he continues to report episodes of chronic cluster headache which worsen with Gleevec. He reported his headache responded with a short course of prednisone previously. He would like to repeat Prednisone course for headache. He stated his tumors seemly are getting smaller. The patient otherwise has no other complaints. Denied fever, chills, night sweat, unintentional weight loss, skin lumps or bumps, acute bleeding or bruising issues. No acute bleeding, blood in stool, dark stool, melena, hematochezia, hemoptysis, dark urine, or easily bruising. Denied headache, acute vision change, dizziness, chest pain, worsen shortness of breath, palpitation, productive cough, vomiting, abdominal pain, altered bowel habits, dysuria, new bone pain or back pain, focal numbness or weakness. Past medical history, family history, and social history: these were reviewed and remains unchanged.     Past Medical History:   Diagnosis Date    CAD (coronary artery disease)     Carpal tunnel syndrome     COPD     Depression     H/O echocardiogram 11/2017    EF 35-40%, mild LVH, biatrial enlargement, mild TR, RVSP 41    Hemorrhoids     History of esophagitis     History of malignant gastrointestinal stromal tumor (GIST)     Hypercholesterolemia     Hypertension     Myocardial infarction [facility])     Pulmonary embolus [facility]) June 2016    Bilateral    S/P CABG x 3 2009    LIMA to LAD, SVG to RPDA, SVG to diagonal    S/P cardiac catheterization November 2011    Patent GALLAGHER to LAD, patent SVG to diet, and occluded SVG to RPDA,  native LAD 85% proximal stenosis, left circumflex 20% diffuse disease, RCA distal 70% stenosis,, EF 45%    Secondary polycythemia      Past Surgical History:   Procedure Laterality Date    HX CORONARY STENT PLACEMENT      HX CYST REMOVAL      HX GI      tumor near stomach    HX HEENT      deviated septum    HX HEMORRHOIDECTOMY      HX ORTHOPAEDIC      knee injury right    HX OTHER SURGICAL      resection of mesenteric mass    AL CARDIAC SURG PROCEDURE UNLIST      stent     Social History     Socioeconomic History    Marital status: SINGLE     Spouse name: Not on file    Number of children: Not on file    Years of education: Not on file    Highest education level: Not on file   Occupational History    Not on file   Social Needs    Financial resource strain: Not on file    Food insecurity     Worry: Not on file     Inability: Not on file    Transportation needs     Medical: Not on file     Non-medical: Not on file   Tobacco Use    Smoking status: Current Every Day Smoker     Packs/day: 1.50    Smokeless tobacco: Never Used   Substance and Sexual Activity    Alcohol use: Yes     Frequency: Monthly or less     Comment: \"I haven't drank in 2 months, but I was drinking about 12 pk beer per week\"     Drug use: No    Sexual activity: Not on file   Lifestyle    Physical activity Days per week: Not on file     Minutes per session: Not on file    Stress: Not on file   Relationships    Social connections     Talks on phone: Not on file     Gets together: Not on file     Attends Mosque service: Not on file     Active member of club or organization: Not on file     Attends meetings of clubs or organizations: Not on file     Relationship status: Not on file    Intimate partner violence     Fear of current or ex partner: Not on file     Emotionally abused: Not on file     Physically abused: Not on file     Forced sexual activity: Not on file   Other Topics Concern    Not on file   Social History Narrative    Not on file     No family history on file. Current Outpatient Medications   Medication Sig Dispense Refill    hydroCHLOROthiazide (HYDRODIURIL) 25 mg tablet take 1 tablet by mouth once daily 30 Tab 6    predniSONE (DELTASONE) 10 mg tablet Take six pills on Day 1 to Day 3, five pills on Day 4, four pills on Day 5, three pills on Day 6, two pills on Day 7, and one pill on Day 8. 33 Tab 0    metoprolol tartrate (LOPRESSOR) 25 mg tablet take 1 tablet by mouth twice a day 180 Tab 3    aspirin (ASPIRIN) 325 mg tablet Take 325 mg by mouth daily.  ramipriL (ALTACE) 10 mg capsule take 1 capsule by mouth daily 90 Cap 3    traZODone (DESYREL) 150 mg tablet Take 150 mg by mouth nightly.  imatinib (GLEEVEC) 400 mg tablet Take one tab by mouth daily with a large glass of water. 30 Tab 4       Review of Systems   Constitutional: Negative for chills, diaphoresis, fever, malaise/fatigue and weight loss. Respiratory: Negative for cough, hemoptysis, shortness of breath and wheezing. Cardiovascular: Negative for chest pain, palpitations and leg swelling. Gastrointestinal: Positive for nausea. Negative for abdominal pain, diarrhea, heartburn and vomiting. Genitourinary: Negative for dysuria, frequency, hematuria and urgency. Musculoskeletal: Negative for joint pain and myalgias. Skin: Negative for itching and rash. Neurological: Positive for headaches. Negative for dizziness, seizures and weakness. Psychiatric/Behavioral: Negative for depression. The patient does not have insomnia. Objective:     Visit Vitals  /85 (BP Patient Position: Sitting)   Pulse 84   Temp 98 °F (36.7 °C) (Skin)   Resp 18   Wt 98.9 kg (218 lb)   SpO2 97%   BMI 30.40 kg/m²       ECOG Performance Status (grade): 0  0 - able to carry on all pre-disease activity w/out restriction  1 - restricted but able to carry out light work  2 - ambulatory and can self- care but unable to carry out work  3 - bed or chair >50% of waking hours  4 - completely disable, total care, confined to bed or chair    Physical Exam  Constitutional:       General: He is not in acute distress. HENT:      Head: Normocephalic and atraumatic. Eyes:      Pupils: Pupils are equal, round, and reactive to light. Neck:      Musculoskeletal: Neck supple. No neck rigidity. Cardiovascular:      Pulses: Normal pulses. Heart sounds: Normal heart sounds. No murmur. Pulmonary:      Effort: Pulmonary effort is normal. No respiratory distress. Breath sounds: Normal breath sounds. Abdominal:      General: Bowel sounds are normal. There is no distension. Palpations: Abdomen is soft. There is mass (multiple abdominal wall masses ). Tenderness: There is no abdominal tenderness. There is no guarding. Musculoskeletal:         General: No swelling or tenderness. Lymphadenopathy:      Cervical: No cervical adenopathy. Skin:     General: Skin is warm. Findings: No rash. Neurological:      Mental Status: He is alert and oriented to person, place, and time. Mental status is at baseline. Cranial Nerves: No cranial nerve deficit. Psychiatric:         Mood and Affect: Mood normal.          Diagnostics:      No results found for this or any previous visit (from the past 96 hour(s)).     Imaging:  No results found for this or any previous visit. Results for orders placed during the hospital encounter of 05/31/16   XR SWALLOW FUNC VIDEO    Narrative Modified barium swallow with video recording:        INDICATION:    Dysphagia. The study has been performed under supervision of speech therapist.    The patient swallowed thin liquid but there is been deep penetration almost to  the level of vocal cord. Then patient swallowed thin liquid food with small sips  and with chin-tucked position. At  this time, there has been no penetration or  aspiration of thin liquid into larynx. Then patient swallowed barium tablet. There has    been penetration of thin liquid used for swallowing tablet into larynx. There  is been no evidence of stagnation of tablet in hypopharynx or in upper  esophagus. Patient swallowed puree and cracker normally without penetration or  nasopharyngeal into larynx. Fluoroscopy time utilized is 48 seconds. No fluoroscopy images is obtained. Impression IMPRESSION:    At first there is penetration of thin liquid into larynx as described. But when  the patient swallowed thin liquid in small sips with chin tuck position, there  is been no penetration or aspiration into larynx. Patient swallowed barium tablet but there has  been penetration of the liquid  used with a tablet. Swallowing of puree and cracker has been normal.         Results for orders placed in visit on 03/24/21   CT CHEST ABD PELV W CONT    Narrative CT chest, abdomen and pelvis with contrast     CLINICAL HISTORY: GIST tumor, CT follow-up    COMPARISON: Chest CT 10/12/20, abdomen pelvis CT 9/18/20. TECHNIQUE: Helical scan to the chest, abdomen and pelvis are obtained from the  thoracic inlet to the symphysis pubis after IV contrast administration.     All CT scans at this facility performed using dose optimization techniques as  appreciated to a performed exam, to include automated exposure control,  adjustment of the mA and or KU according to patient size (including appropriate  matching for site specific examination), or use of iterative reconstruction  technique. FINDINGS:     CT CHEST:     Lung Parenchyma: Moderate to advanced emphysema with bullous disease again  noted. Chronic atelectasis at medial right middle lobe appears stable. Several  tiny granulomas at periphery of right lower lobe. 2 mm subpleural nodule at left  lower lobe on #56/3 and pleural-based 2 mm nodule at lateral right lower lobe on  #51/3 appear stable. No new pulmonary finding. Thyroid: Unremarkable. Mediastinum: Multiple subcentimeter and centimeter lymph nodes in mediastinum  and bilateral angel appear stable. No new finding. Heart: The heart and the pericardium appear normal. Moderate burden of coronary  artery calcifications. Vasculature: The aorta and the great vessels are unremarkable. Pleural Space And Chest Wall: No significant pleural pathology. CT ABDOMEN AND PELVIS:    Peritoneum: Multiple large complex masses again seen in the abdominal and pelvic  hernia cavity, some are unchanged but other larger masses show mild decrease in  size, particular decrease in solid enhancing components, suggesting partial  response to the treatment. The largest mass at anterior peritoneal cavity with  multiple enhancing projections through the peritoneal layer into the  subcutaneous abdominal wall in paraumbilical region is show mild interval  decrease in size from 12.7 x 19.1 cm to 8.8 x 17.0 cm. The peripheral enhancing  components much decrease in content with predominantly cystic appearance. The  adjacent mass in left upper pelvis also decreased from 5.3 x 7.7 cm to 4.9 x 7.1  cm. The large left upper pelvic mass is slightly enlarged from 8.4 x 10.0 cm to  9.1 x 10.0 cm but the peripheral enhancing components on prior CT are barely  visible today.  The right pelvic perirectal mass appears slightly decreased in  size and now measures 3.8 x 4.5 cm. The 3.0 x 3.5 cm complex enhancing mass in  left pericolic gutter abutting the small bowel and descending colon has being  markedly decreased to 1.5 x 1.8 cm. The large triangular mass with laminated  calcifications in the left upper quadrant inferior to the spleen has been moved  to the mesenteric root and appears grossly similar. Multiple other small masses  in right pericolonic gutter appear grossly unchanged. Retroperitoneum: Small nonpathologic retroperitoneal lymph nodes are present. Borderline bilateral inguinal lymph nodes appear stable. Liver: Multiple tiny hepatic hypodense nodules appear grossly unchanged. Mild  hepatic steatosis noted. No definite new enhancing lesion seen. Gallbladder: Normal.     Biliary System: No ductal dilatation. Spleen: Normal.    Pancreas: Normal.    Adrenal Glands: The bilateral adrenal hyperenhancing masses appear grossly  unchanged, 1.6 x 2.2 x 3.1 cm on the right and 1.5 x 2.3 x 3.9 cm on the left. Kidneys: Mild caliectasis and hydronephrosis in left kidney seems to be mildly  progressed with interval progression of moderate proximal left ureteral  dilatation, likely due to exophytic compression by large left upper pelvic mass. Bowel: The small and large bowel are nondilated. Lower genitourinary system: The bladder the bladder is mildly distended with  mild compressed by left upper pelvic mass. Vasculature: Moderate atherosclerotic aortic disease. Other: No free fluid. Multiple enhancing projections from large anterior  peritoneal mass as mentioned above. The enhancing mass along the left abdominal  wall appears unchanged and measures 1.8 x 3.4 cm on #122/2. CT OSSEOUS STRUCTURES:      Moderate spondylosis. Impression 1. Moderate to advanced emphysema with bullous disease. Stable tiny lung  nodules.     2. Multiple abdominal and pelvic peritoneal masses mostly show mildly decrease  in size and much decreased peripheral enhancing components since the prior CT,  suggesting partial response to the treatment. Please see outlined above. No  definite new lesion seen. 3. The lateral adrenal nodules appear unchanged. 4. Stable tiny hepatic hypodense nodules. 5. Stable nonpathologic and borderline lymph nodes in mediastinum, bilateral  axillae, retroperitoneum and inguinal regions. 6. Mild progression of left caliectasis and hydronephrosis with no moderate  proximal left hydroureter, likely progression of extrinsic compression by large  left pelvic mass which is mildly enlarged with less complex appearance as  mentioned in finding. Thank you for this referral.      PATHOLOGY  12/3/2020 ANTERIOR ABDOMINAL WALL MASS, BIOPSY:   GASTROINTESTINAL STROMAL TUMOR. GASTROINTESTINAL STROMAL TUMOR (GIST): Biopsy   SPECIMEN   Procedure: Core needle biopsy   TUMOR   Tumor Site: Anterior abdominal wall   Histologic Type: Gastrointestinal stromal tumor, spindle cell type   Histologic Grade: G1: Low grade; mitotic rate <= 5 / 5 mm2   Mitotic Rate: 1 mitoses per 5 mm2   Necrosis: Not identified   Risk Assessment: Cannot be determined: Recurrent   Treatment Effect: No known prebiopsy therapy     DIAGNOSIS COMMENT:   Given the patient's history of gastrointestinal stromal tumor, the findings are consistent with recurrent disease. Assessment:     1. Abdominal mass, unspecified abdominal location    2. Gastrointestinal stromal tumor ([address]    3. CINV (chemotherapy-induced nausea and vomiting)    4. Pulmonary embolism without acute cor pulmonale, unspecified chronicity, unspecified pulmonary embolism type ([address]    5. Tobacco use    6. Erythrocytosis        Plan:     Recurrent GIST  Abdominal wall mass  History of GIST 2010:  -- History of GIST, Dx 2010. S.p adjuvant Imatinib for about 1 year reportedly.    -- 9/18/2020 CT reported extensive abdominal and pelvic mesenteric masses with some masses directly extending and penetrating through the anterior abdominal wall reflective of the given history of bulge. There are some regions of benign herniation but the vast majority is malignant. Findings may be primary and/or metastatic and within the spectrum of carcinomatosis. Left adrenal nodule new since prior exam. Metastasis is not excluded. Stable right adrenal nodule. Stable hepatic hypodensities. -- 12/3/2020 Abdominal wall biopsy confirmed recurrent GIST.  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 Due to granular background staining, interpretation of PDL 1 immunohistochemical stain is not possible. -- He has seen Dr. Ana Paula Atkins for initial evaluation of resection. I have discussed with Dr. Madelyn Jacobsen who suggested to start neoadjuvant TKIs prior to surgical resection. He was not interested in XRT at this point. 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 -- Since 2/16/2021 he was started Imatinib at 400 mg daily. He has tolerated therapy without high graded toxicities. He reported his tumors became smaller with therapy. -- Since last visit he continues to report episodes of chronic cluster headache which worsen with Gleevec. He reported his headache improved with a short course of prednisone. He would like to repeat Prednisone course for headache. He stated his tumors seemly are getting smaller. He admitted he took [address] intermittently due to cluster headache.  -- Today I have reviewed with the patient about recent CT reports. 4/7/2021 CT C/A/P reported multiple abdominal and pelvic peritoneal masses mostly show mildly decrease in size and much decreased peripheral enhancing components since the prior CT, suggesting partial response to the treatment. No new lesion seen.     Plan:  -- The patient will continue [address] as instructed. Will refill a short course of Prednisone for headache while he is receiving Gleevec. -- He will be referred back to Dr. Michelle Kraus for follow up regarding surgical resection  -- Labs today and monthly: CBC, CMP  -- He will RTC 4 weeks for follows-up. Always sooner if required. Cluster headache  -- He has been on Topamax; however concerned of side effects. -- He requested another course of prednisone. -- We will refill short course of prednisone. -- Advised to f/u PCP/Neurology for long term control. CNIV  -- Zofran PRN      Pulmonary embolus  -- He was diagnosed with one-time pulmonary embolus in May of 2016. Patient was being followed by Dr. Mariia Solomon who retired recently. He states that he is taking Xarelto 20mg PO daily since then. Thrombophilia w/u was negative reportedly. -- 10/12/2020 CTA reported no evidence of pulmonary embolism. -- Xarelto was d/c. Patient was advised to continue on ASA 81 mg for thromboprophylaxis. Polycythemia  -- He is an active smoker. He did not receive his phlebotomy for months for his polycythemia. Negative (wild type) for the JAK2 [id number].   -- His polycythemia was likely secondary 2/2 smoking, COPD. Recent CBC on 7/2020 showed improving H/H, 14/42. 4. I have counseled him on smoking cessation. -- Monitor CBC periodically      Orders Placed This Encounter    predniSONE (DELTASONE) 10 mg tablet     Sig: Take six pills on Day 1 to Day 3, five pills on Day 4, four pills on Day 5, three pills on Day 6, two pills on Day 7, and one pill on Day 8. Dispense:  33 Tab     Refill:  0              All of patient's questions answered to their apparent satisfaction. They verbally show understanding and agreement with aforementioned plan.          Erin Acosta MD          About 25 minutes were spent for this encounter with more than 50% of the time spent in face-to-face counseling, discussing on diagnosis and management plan going forward, and co-ordination of care.  Parts of this document has been produced using Dragon dictation system. Unrecognized errors in transcription may be present. Please do not hesitate to reach out for any questions or clarifications.       CC: RADHA Bansal;

## 2021-05-12 ENCOUNTER — LAB ONLY (OUTPATIENT)
Dept: ONCOLOGY | Age: 56
End: 2021-05-12

## 2021-05-12 DIAGNOSIS — D75.1 ERYTHROCYTOSIS: ICD-10-CM

## 2021-05-12 DIAGNOSIS — C49.A0 GASTROINTESTINAL STROMAL TUMOR (HCC): Primary | ICD-10-CM

## 2021-05-12 DIAGNOSIS — R19.00 ABDOMINAL MASS, UNSPECIFIED ABDOMINAL LOCATION: ICD-10-CM

## 2021-05-13 LAB
A-G RATIO,AGRAT: 1.9 RATIO (ref 1.1–2.6)
ABSOLUTE LYMPHOCYTE COUNT, 10803: 3.4 K/UL (ref 1–4.8)
ALBUMIN SERPL-MCNC: 4.1 G/DL (ref 3.5–5)
ALP SERPL-CCNC: 43 U/L (ref 25–115)
ALT SERPL-CCNC: 13 U/L (ref 5–40)
ANION GAP SERPL CALC-SCNC: 9.1 MMOL/L (ref 3–15)
AST SERPL W P-5'-P-CCNC: 16 U/L (ref 10–37)
BASOPHILS # BLD: 0.2 K/UL (ref 0–0.2)
BASOPHILS NFR BLD: 1 % (ref 0–2)
BILIRUB SERPL-MCNC: 0.2 MG/DL (ref 0.2–1.2)
BUN SERPL-MCNC: 18 MG/DL (ref 6–22)
CALCIUM SERPL-MCNC: 9.8 MG/DL (ref 8.4–10.5)
CHLORIDE SERPL-SCNC: 97 MMOL/L (ref 98–110)
CO2 SERPL-SCNC: 31 MMOL/L (ref 20–32)
CREAT SERPL-MCNC: 0.8 MG/DL (ref 0.5–1.2)
EOSINOPHIL # BLD: 0.4 K/UL (ref 0–0.5)
EOSINOPHIL NFR BLD: 3 % (ref 0–6)
ERYTHROCYTE [DISTWIDTH] IN BLOOD BY AUTOMATED COUNT: 19 % (ref 10–15.5)
GFRAA, 66117: >60
GFRNA, 66118: >60
GLOBULIN,GLOB: 2.2 G/DL (ref 2–4)
GLUCOSE SERPL-MCNC: 82 MG/DL (ref 70–99)
GRANULOCYTES,GRANS: 61 % (ref 40–75)
HCT VFR BLD AUTO: 49.8 % (ref 39.3–51.6)
HGB BLD-MCNC: 15.7 G/DL (ref 13.1–17.2)
LYMPHOCYTES, LYMLT: 25 % (ref 20–45)
MCH RBC QN AUTO: 33 PG (ref 26–34)
MCHC RBC AUTO-ENTMCNC: 32 G/DL (ref 31–36)
MCV RBC AUTO: 103 FL (ref 80–95)
MONOCYTES # BLD: 1.1 K/UL (ref 0.1–1)
MONOCYTES NFR BLD: 8 % (ref 3–12)
NEUTROPHILS # BLD AUTO: 8.5 K/UL (ref 1.8–7.7)
PLATELET # BLD AUTO: 344 K/UL (ref 140–440)
PMV BLD AUTO: 10.5 FL (ref 9–13)
POTASSIUM SERPL-SCNC: 4.6 MMOL/L (ref 3.5–5.5)
PROT SERPL-MCNC: 6.3 G/DL (ref 6.4–8.3)
RBC # BLD AUTO: 4.82 M/UL (ref 3.8–5.8)
SODIUM SERPL-SCNC: 137 MMOL/L (ref 133–145)
WBC # BLD AUTO: 14 K/UL (ref 4–11)

## 2021-05-24 NOTE — PROGRESS NOTES
Hematology/Oncology  Progress Note    Name: Alroney Kidney  Date: 2021  : 1965    RADHA Rocha     Mr. Vashti Connelly is a 60y.o. year old male who was seen for recurrent GIST. Hx GIST small bowel cancer removed 2010, s.p Gleevec    Current therapy: Imatinib at 400 mg daily. Subjective:     Mr. Vashti Connelly is a 57-year-old man who was diagnosed with a pulmonary embolus in May of 2016. Patient was being followed by Dr. Filiberto Mix who retired. He states that he is taking Xarelto 20mg PO daily. He admits smoking 1 ppd from 1 1/2ppd and states he is willing to cut back some more until he totally quits. He went to abdominal wall biopsy recently which confirmed recurrent GIST. He has hx of GIST in . He reported he was taking Gleevec for about one year after surgery at that time. He has started [facility] since 2021. He denied significant pain or vomiting or altered bowel movements. He is an active smoker. He did not receive his phlebotomy for months for his polycythemia. Since last visit he continues to report episodes of chronic cluster headache which worsen with Gleevec. He reported his headache responded with a short course of prednisone previously. He will call to refill Prednisone if worsening headache. He stated his tumors seemly are getting smaller. He has seen [facility] for evaluation. He has scheduled stress test in 2021, then will see [facility] again in July for surgical plan. The patient otherwise has no other complaints. Denied fever, chills, night sweat, unintentional weight loss, skin lumps or bumps, acute bleeding or bruising issues. No acute bleeding, blood in stool, dark stool, melena, hematochezia, hemoptysis, dark urine, or easily bruising.  Denied headache, acute vision change, dizziness, chest pain, worsen shortness of breath, palpitation, productive cough, vomiting, abdominal pain, altered bowel habits, dysuria, new bone pain or back pain, focal numbness or weakness. Past medical history, family history, and social history: these were reviewed and remains unchanged.     Past Medical History:   Diagnosis Date    CAD (coronary artery disease)     Carpal tunnel syndrome     COPD     Depression     H/O echocardiogram 11/2017    EF 35-40%, mild LVH, biatrial enlargement, mild TR, RVSP 41    Hemorrhoids     History of esophagitis     History of malignant gastrointestinal stromal tumor (GIST)     Hypercholesterolemia     Hypertension     Myocardial infarction [facility])     Pulmonary embolus ([address] June 2016    Bilateral    S/P CABG x 3 2009    LIMA to LAD, SVG to RPDA, SVG to diagonal    S/P cardiac catheterization November 2011    Patent GALLAGHER to LAD, patent SVG to diet, and occluded SVG to RPDA,  native LAD 85% proximal stenosis, left circumflex 20% diffuse disease, RCA distal 70% stenosis,, EF 45%    Secondary polycythemia      Past Surgical History:   Procedure Laterality Date    HX CORONARY STENT PLACEMENT      HX CYST REMOVAL      HX GI      tumor near stomach    HX HEENT      deviated septum    HX HEMORRHOIDECTOMY      HX ORTHOPAEDIC      knee injury right    HX OTHER SURGICAL      resection of mesenteric mass    OR CARDIAC SURG PROCEDURE UNLIST      stent     Social History     Socioeconomic History    Marital status: SINGLE     Spouse name: Not on file    Number of children: Not on file    Years of education: Not on file    Highest education level: Not on file   Occupational History    Not on file   Tobacco Use    Smoking status: Current Every Day Smoker     Packs/day: 1.50    Smokeless tobacco: Never Used   Substance and Sexual Activity    Alcohol use: Yes     Comment: \"I haven't drank in 2 months, but I was drinking about 12 pk beer per week\"     Drug use: No    Sexual activity: Not on file   Other Topics Concern    Not on file   Social History Narrative    Not on file     Social Determinants of Health     Financial Resource Strain:     Difficulty of Paying Living Expenses:    Food Insecurity:     Worried About Running Out of Food in the Last Year:     [address] in the Last Year:    Transportation Needs:     Lack of Transportation (Medical):  Lack of Transportation (Non-Medical):    Physical Activity:     Days of Exercise per Week:     Minutes of Exercise per Session:    Stress:     Feeling of Stress :    Social Connections:     Frequency of Communication with Friends and Family:     Frequency of Social Gatherings with Friends and Family:     Attends [employer] Services:     Active Member of Clubs or Organizations:     Attends Club or Organization Meetings:     Marital Status:    Intimate Partner Violence:     Fear of Current or Ex-Partner:     Emotionally Abused:     Physically Abused:     Sexually Abused:      No family history on file. Current Outpatient Medications   Medication Sig Dispense Refill    predniSONE (DELTASONE) 10 mg tablet Take six pills on Day 1 to Day 3, five pills on Day 4, four pills on Day 5, three pills on Day 6, two pills on Day 7, and one pill on Day 8. 33 Tablet 0    hydroCHLOROthiazide (HYDRODIURIL) 25 mg tablet take 1 tablet by mouth once daily 30 Tab 6    metoprolol tartrate (LOPRESSOR) 25 mg tablet take 1 tablet by mouth twice a day 180 Tab 3    imatinib (GLEEVEC) 400 mg tablet Take one tab by mouth daily with a large glass of water. 30 Tab 4    aspirin (ASPIRIN) 325 mg tablet Take 325 mg by mouth daily.  ramipriL (ALTACE) 10 mg capsule take 1 capsule by mouth daily 90 Cap 3    traZODone (DESYREL) 150 mg tablet Take 150 mg by mouth nightly. Review of Systems   Constitutional: Negative for chills, diaphoresis, fever, malaise/fatigue and weight loss. Respiratory: Negative for cough, hemoptysis, shortness of breath and wheezing. Cardiovascular: Negative for chest pain, palpitations and leg swelling. Gastrointestinal: Positive for nausea.  Negative for abdominal pain, diarrhea, heartburn and vomiting. Genitourinary: Negative for dysuria, frequency, hematuria and urgency. Musculoskeletal: Negative for joint pain and myalgias. Skin: Negative for itching and rash. Neurological: Positive for headaches. Negative for dizziness, seizures and weakness. Psychiatric/Behavioral: Negative for depression. The patient does not have insomnia. Objective:     Visit Vitals  /88   Pulse 67   Temp 98.2 °F (36.8 °C) (Temporal)   Resp 18   Wt 101.6 kg (224 lb)   SpO2 97%   BMI 31.24 kg/m²       ECOG Performance Status (grade): 0  0 - able to carry on all pre-disease activity w/out restriction  1 - restricted but able to carry out light work  2 - ambulatory and can self- care but unable to carry out work  3 - bed or chair >50% of waking hours  4 - completely disable, total care, confined to bed or chair    Physical Exam  Constitutional:       General: He is not in acute distress. HENT:      Head: Normocephalic and atraumatic. Eyes:      Pupils: Pupils are equal, round, and reactive to light. Cardiovascular:      Pulses: Normal pulses. Heart sounds: Normal heart sounds. No murmur heard. Pulmonary:      Effort: Pulmonary effort is normal. No respiratory distress. Breath sounds: Normal breath sounds. Abdominal:      General: Bowel sounds are normal. There is no distension. Palpations: Abdomen is soft. There is mass (multiple abdominal wall masses ). Tenderness: There is no abdominal tenderness. There is no guarding. Musculoskeletal:         General: No swelling or tenderness. Cervical back: Neck supple. No rigidity. Lymphadenopathy:      Cervical: No cervical adenopathy. Skin:     General: Skin is warm. Findings: No rash. Neurological:      Mental Status: He is alert and oriented to person, place, and time. Mental status is at baseline. Cranial Nerves: No cranial nerve deficit.    Psychiatric:         Mood and Affect: Mood normal.          Diagnostics:      No results found for this or any previous visit (from the past 96 hour(s)). Imaging:  No results found for this or any previous visit. Results for orders placed during the hospital encounter of 05/31/16    XR SWALLOW FUNC VIDEO    Narrative  Modified barium swallow with video recording:        INDICATION:    Dysphagia. The study has been performed under supervision of speech therapist.    The patient swallowed thin liquid but there is been deep penetration almost to  the level of vocal cord. Then patient swallowed thin liquid food with small sips  and with chin-tucked position. At  this time, there has been no penetration or  aspiration of thin liquid into larynx. Then patient swallowed barium tablet. There has  been penetration of thin liquid used for swallowing tablet into larynx. There  is been no evidence of stagnation of tablet in hypopharynx or in upper  esophagus. Patient swallowed puree and cracker normally without penetration or  nasopharyngeal into larynx. Fluoroscopy time utilized is 48 seconds. No fluoroscopy images is obtained. Impression  : At first there is penetration of thin liquid into larynx as described. But when  the patient swallowed thin liquid in small sips with chin tuck position, there  is been no penetration or aspiration into larynx. Patient swallowed barium tablet but there has  been penetration of the liquid  used with a tablet. Swallowing of puree and cracker has been normal.      Results for orders placed in visit on 03/24/21    CT CHEST ABD PELV W CONT    Narrative  CT chest, abdomen and pelvis with contrast    CLINICAL HISTORY: GIST tumor, CT follow-up    COMPARISON: Chest CT 10/12/20, abdomen pelvis CT 9/18/20. TECHNIQUE: Helical scan to the chest, abdomen and pelvis are obtained from the  thoracic inlet to the symphysis pubis after IV contrast administration.     All CT scans at this facility performed using dose optimization techniques as  appreciated to a performed exam, to include automated exposure control,  adjustment of the mA and or KU according to patient size (including appropriate  matching for site specific examination), or use of iterative reconstruction  technique. FINDINGS:    CT CHEST:    Lung Parenchyma: Moderate to advanced emphysema with bullous disease again  noted. Chronic atelectasis at medial right middle lobe appears stable. Several  tiny granulomas at periphery of right lower lobe. 2 mm subpleural nodule at left  lower lobe on #56/3 and pleural-based 2 mm nodule at lateral right lower lobe on  #51/3 appear stable. No new pulmonary finding. Thyroid: Unremarkable. Mediastinum: Multiple subcentimeter and centimeter lymph nodes in mediastinum  and bilateral angel appear stable. No new finding. Heart: The heart and the pericardium appear normal. Moderate burden of coronary  artery calcifications. Vasculature: The aorta and the great vessels are unremarkable. Pleural Space And Chest Wall: No significant pleural pathology. CT ABDOMEN AND PELVIS:    Peritoneum: Multiple large complex masses again seen in the abdominal and pelvic  hernia cavity, some are unchanged but other larger masses show mild decrease in  size, particular decrease in solid enhancing components, suggesting partial  response to the treatment. The largest mass at anterior peritoneal cavity with  multiple enhancing projections through the peritoneal layer into the  subcutaneous abdominal wall in paraumbilical region is show mild interval  decrease in size from 12.7 x 19.1 cm to 8.8 x 17.0 cm. The peripheral enhancing  components much decrease in content with predominantly cystic appearance. The  adjacent mass in left upper pelvis also decreased from 5.3 x 7.7 cm to 4.9 x 7.1  cm.  The large left upper pelvic mass is slightly enlarged from 8.4 x 10.0 cm to  9.1 x 10.0 cm but the peripheral enhancing components on prior CT are barely  visible today. The right pelvic perirectal mass appears slightly decreased in  size and now measures 3.8 x 4.5 cm. The 3.0 x 3.5 cm complex enhancing mass in  left pericolic gutter abutting the small bowel and descending colon has being  markedly decreased to 1.5 x 1.8 cm. The large triangular mass with laminated  calcifications in the left upper quadrant inferior to the spleen has been moved  to the mesenteric root and appears grossly similar. Multiple other small masses  in right pericolonic gutter appear grossly unchanged. Retroperitoneum: Small nonpathologic retroperitoneal lymph nodes are present. Borderline bilateral inguinal lymph nodes appear stable. Liver: Multiple tiny hepatic hypodense nodules appear grossly unchanged. Mild  hepatic steatosis noted. No definite new enhancing lesion seen. Gallbladder: Normal.    Biliary System: No ductal dilatation. Spleen: Normal.    Pancreas: Normal.    Adrenal Glands: The bilateral adrenal hyperenhancing masses appear grossly  unchanged, 1.6 x 2.2 x 3.1 cm on the right and 1.5 x 2.3 x 3.9 cm on the left. Kidneys: Mild caliectasis and hydronephrosis in left kidney seems to be mildly  progressed with interval progression of moderate proximal left ureteral  dilatation, likely due to exophytic compression by large left upper pelvic mass. Bowel: The small and large bowel are nondilated. Lower genitourinary system: The bladder the bladder is mildly distended with  mild compressed by left upper pelvic mass. Vasculature: Moderate atherosclerotic aortic disease. Other: No free fluid. Multiple enhancing projections from large anterior  peritoneal mass as mentioned above. The enhancing mass along the left abdominal  wall appears unchanged and measures 1.8 x 3.4 cm on #122/2. CT OSSEOUS STRUCTURES:    Moderate spondylosis. Impression  1. Moderate to advanced emphysema with bullous disease.  Stable tiny lung  nodules. 2. Multiple abdominal and pelvic peritoneal masses mostly show mildly decrease  in size and much decreased peripheral enhancing components since the prior CT,  suggesting partial response to the treatment. Please see outlined above. No  definite new lesion seen. 3. The lateral adrenal nodules appear unchanged. 4. Stable tiny hepatic hypodense nodules. 5. Stable nonpathologic and borderline lymph nodes in mediastinum, bilateral  axillae, retroperitoneum and inguinal regions. 6. Mild progression of left caliectasis and hydronephrosis with no moderate  proximal left hydroureter, likely progression of extrinsic compression by large  left pelvic mass which is mildly enlarged with less complex appearance as  mentioned in finding. Thank you for this referral.    PATHOLOGY  12/3/2020 ANTERIOR ABDOMINAL WALL MASS, BIOPSY:   GASTROINTESTINAL STROMAL TUMOR. GASTROINTESTINAL STROMAL TUMOR (GIST): Biopsy   SPECIMEN   Procedure: Core needle biopsy   TUMOR   Tumor Site: Anterior abdominal wall   Histologic Type: Gastrointestinal stromal tumor, spindle cell type   Histologic Grade: G1: Low grade; mitotic rate <= 5 / 5 mm2   Mitotic Rate: 1 mitoses per 5 mm2   Necrosis: Not identified   Risk Assessment: Cannot be determined: Recurrent   Treatment Effect: No known prebiopsy therapy     DIAGNOSIS COMMENT:   Given the patient's history of gastrointestinal stromal tumor, the findings are consistent with recurrent disease. Assessment:     1. Gastrointestinal stromal tumor ([address]    2. Abdominal mass, unspecified abdominal location    3. Intractable chronic cluster headache    4. Erythrocytosis    5. Pulmonary embolism without acute cor pulmonale, unspecified chronicity, unspecified pulmonary embolism type ([address]        Plan:     Recurrent GIST  Abdominal wall mass  History of GIST 2010:  -- History of GIST, Dx 2010. S.p adjuvant Imatinib for about 1 year reportedly.    -- 9/18/2020 CT reported extensive abdominal and pelvic mesenteric masses with some masses directly extending and penetrating through the anterior abdominal wall reflective of the given history of bulge. There are some regions of benign herniation but the vast majority is malignant. Findings may be primary and/or metastatic and within the spectrum of carcinomatosis. Left adrenal nodule new since prior exam. Metastasis is not excluded. Stable right adrenal nodule. Stable hepatic hypodensities. -- 12/3/2020 Abdominal wall biopsy confirmed recurrent GIST.  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 Due to granular background staining, interpretation of PDL 1 immunohistochemical stain is not possible. -- He has seen Dr. Enzo Narvaez for initial evaluation of resection. I have discussed with Dr. Casandra Hansen who suggested to start neoadjuvant TKIs prior to surgical resection. He was not interested in XRT at this point. 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 -- Since 2/16/2021 he was started Imatinib at 400 mg daily. He has tolerated therapy without high graded toxicities. He reported his tumors became smaller with therapy. -- 4/7/2021 CT C/A/P reported multiple abdominal and pelvic peritoneal masses mostly show mildly decrease in size and much decreased peripheral enhancing components since the prior CT, suggesting partial response to the treatment. No new lesion seen. -- Since last visit he continues to report episodes of chronic cluster headache which worsen with Gleevec. He has decided to take [address] 7 days on/3 days off due to worsening headache. He reported his headache responded with a short course of prednisone previously. He will call to refill Prednisone if worsening headache.  He stated his tumors seemly are getting smaller. -- He has [facility] for [facility]. He has scheduled stress test in June 2021, then will see [facility] again in 2nd week of July for surgical plan. Plan:  -- The patient will continue [address]. He has decided to take [address] 7 days on/3 days off due to worsening headache.   -- Will refill a short course of Prednisone for headache while he is receiving Gleevec. -- He will f/u Dr. Michelle Kraus for surgical plan  -- Labs today and monthly: CBC, CMP  -- He will RTC 4 weeks for follows-up. Always sooner if required. Cluster headache/ Intractable headache  -- He has been on Topamax; however concerned of side effects. -- He requested another course of prednisone. -- We will refill short course of prednisone. -- Advised to f/u Neurology for long term control. He states he will call Neuro for schedule. -- Brain MRI to r/o intracranial lesions        CNIV  -- Zofran PRN      Pulmonary embolus  -- He was diagnosed with one-time pulmonary embolus in May of 2016. Patient was being followed by Dr. Mariia Solomon who retired recently. He states that he is taking Xarelto 20mg PO daily since then. Thrombophilia w/u was negative reportedly. -- 10/12/2020 CTA reported no evidence of pulmonary embolism. -- Xarelto was d/c. Patient was advised to continue on ASA 81 mg for thromboprophylaxis. Polycythemia  -- He is an active smoker. He did not receive his phlebotomy for months for his polycythemia. Negative (wild type) for the JAK2 [id number].   -- His polycythemia was likely secondary 2/2 smoking, COPD. Recent CBC on 7/2020 showed improving H/H, 14/42. 4. I have counseled him on smoking cessation.   -- Monitor CBC periodically      Orders Placed This Encounter    MRI BRAIN W WO CONT     Standing Status:   Future     Standing Expiration Date:   6/28/2022     Order Specific Question:   STAT Creatinine as indicated     Answer:   Yes     Order Specific Question:   Reason for Exam     Answer:   intractable headache    predniSONE (DELTASONE) 10 mg tablet     Sig: Take six pills on Day 1 to Day 3, five pills on Day 4, four pills on Day 5, three pills on Day 6, two pills on Day 7, and one pill on Day 8. Dispense:  33 Tablet     Refill:  0              All of patient's questions answered to their apparent satisfaction. They verbally show understanding and agreement with aforementioned plan. Annie Strickland MD          About 30 minutes were spent for this encounter with more than 50% of the time spent in face-to-face counseling, discussing on diagnosis and management plan going forward, and co-ordination of care. Parts of this document has been produced using Dragon dictation system. Unrecognized errors in transcription may be present. Please do not hesitate to reach out for any questions or clarifications.       CC: RADHA Ball;

## 2021-05-26 ENCOUNTER — OFFICE VISIT (OUTPATIENT)
Dept: ONCOLOGY | Age: 56
End: 2021-05-26
Payer: COMMERCIAL

## 2021-05-26 VITALS
DIASTOLIC BLOOD PRESSURE: 88 MMHG | HEART RATE: 67 BPM | WEIGHT: 224 LBS | TEMPERATURE: 98.2 F | OXYGEN SATURATION: 97 % | SYSTOLIC BLOOD PRESSURE: 130 MMHG | BODY MASS INDEX: 31.24 KG/M2 | RESPIRATION RATE: 18 BRPM

## 2021-05-26 DIAGNOSIS — D75.1 ERYTHROCYTOSIS: ICD-10-CM

## 2021-05-26 DIAGNOSIS — R19.00 ABDOMINAL MASS, UNSPECIFIED ABDOMINAL LOCATION: ICD-10-CM

## 2021-05-26 DIAGNOSIS — C49.A0 GASTROINTESTINAL STROMAL TUMOR (HCC): Primary | ICD-10-CM

## 2021-05-26 DIAGNOSIS — I26.99 PULMONARY EMBOLISM WITHOUT ACUTE COR PULMONALE, UNSPECIFIED CHRONICITY, UNSPECIFIED PULMONARY EMBOLISM TYPE (HCC): ICD-10-CM

## 2021-05-26 DIAGNOSIS — G44.021 INTRACTABLE CHRONIC CLUSTER HEADACHE: ICD-10-CM

## 2021-05-26 PROCEDURE — 99214 OFFICE O/P EST MOD 30 MIN: CPT | Performed by: INTERNAL MEDICINE

## 2021-05-28 RX ORDER — PREDNISONE 10 MG/1
TABLET ORAL
Qty: 33 TABLET | Refills: 0 | Status: SHIPPED | OUTPATIENT
Start: 2021-05-28 | End: 2021-08-02 | Stop reason: ALTCHOICE

## 2021-06-01 DIAGNOSIS — I25.10 CORONARY ARTERY DISEASE INVOLVING NATIVE CORONARY ARTERY OF NATIVE HEART WITHOUT ANGINA PECTORIS: ICD-10-CM

## 2021-06-04 DIAGNOSIS — C49.A0 GASTROINTESTINAL STROMAL TUMOR (HCC): ICD-10-CM

## 2021-06-15 LAB
ECHO AO ROOT DIAM: 4.3 CM
ECHO LA AREA 4C: 18.13 CM2
ECHO LA VOL 2C: 54.05 ML (ref 18–58)
ECHO LA VOL 4C: 43.14 ML (ref 18–58)
ECHO LA VOL BP: 52.74 ML (ref 18–58)
ECHO LA VOL/BSA BIPLANE: 23.86 ML/M2 (ref 16–28)
ECHO LA VOLUME INDEX A2C: 24.46 ML/M2 (ref 16–28)
ECHO LA VOLUME INDEX A4C: 19.52 ML/M2 (ref 16–28)
ECHO LV E' LATERAL VELOCITY: 14.55 CM/S
ECHO LV E' SEPTAL VELOCITY: 10.05 CM/S
ECHO LV INTERNAL DIMENSION DIASTOLIC: 6.06 CM (ref 4.2–5.9)
ECHO LV INTERNAL DIMENSION SYSTOLIC: 5.06 CM
ECHO LV IVSD: 0.92 CM (ref 0.6–1)
ECHO LV MASS 2D: 254.3 G (ref 88–224)
ECHO LV MASS INDEX 2D: 115.1 G/M2 (ref 49–115)
ECHO LV POSTERIOR WALL DIASTOLIC: 1.1 CM (ref 0.6–1)
ECHO LVOT CARDIAC OUTPUT: 6.47 LITER/MINUTE
ECHO LVOT DIAM: 2.24 CM
ECHO LVOT PEAK GRADIENT: 3.48 MMHG
ECHO LVOT PEAK VELOCITY: 93.24 CM/S
ECHO LVOT SV: 68.9 ML
ECHO LVOT VTI: 17.53 CM
ECHO MV A VELOCITY: 74.27 CM/S
ECHO MV E DECELERATION TIME (DT): 220.66 MS
ECHO MV E VELOCITY: 52.57 CM/S
ECHO MV E/A RATIO: 0.71
ECHO MV E/E' LATERAL: 3.61
ECHO MV E/E' RATIO (AVERAGED): 4.42
ECHO MV E/E' SEPTAL: 5.23
ECHO RV TAPSE: 1.97 CM (ref 1.5–2)
LVOT MG: 1.95 MMHG
STRESS BASELINE DIAS BP: 80 MMHG
STRESS BASELINE HR: 101 BPM
STRESS BASELINE SYS BP: 144 MMHG
STRESS ESTIMATED WORKLOAD: 1 METS
STRESS EXERCISE DUR MIN: NORMAL
STRESS PEAK DIAS BP: 80 MMHG
STRESS PEAK SYS BP: 144 MMHG
STRESS PERCENT HR ACHIEVED: 77 %
STRESS POST PEAK HR: 126 BPM
STRESS RATE PRESSURE PRODUCT: NORMAL BPM*MMHG
STRESS ST DEPRESSION: 0 MM
STRESS ST ELEVATION: 0 MM
STRESS STAGE 1 BP: NORMAL MMHG
STRESS STAGE 1 DURATION: 3 MIN:SEC
STRESS STAGE 1 HR: 125 BPM
STRESS STAGE RECOVERY 1 BP: NORMAL MMHG
STRESS STAGE RECOVERY 1 DURATION: 1 MIN:SEC
STRESS STAGE RECOVERY 1 HR: 102 BPM
STRESS TARGET HR: 163 BPM

## 2021-06-15 NOTE — PROGRESS NOTES
Per your last note\" Ischemic cardiomyopathy. EF 35-40% on his most recent echocardiogram in November 2017. He remains on appropriate medical therapy including a beta-blocker and ACE inhibitor. He has never required scheduled loop diuretics. He does not appear to have decompensated heart failure. I have recommended repeat an echocardiogram to reevaluate his LV function.

## 2021-06-16 ENCOUNTER — TELEPHONE (OUTPATIENT)
Dept: CARDIOLOGY CLINIC | Age: 56
End: 2021-06-16

## 2021-06-16 DIAGNOSIS — C49.A0 GASTROINTESTINAL STROMAL TUMOR (HCC): ICD-10-CM

## 2021-06-16 RX ORDER — IMATINIB MESYLATE 400 MG/1
TABLET, FILM COATED ORAL
Qty: 30 TABLET | Refills: 4 | Status: SHIPPED | OUTPATIENT
Start: 2021-06-16 | End: 2022-01-10 | Stop reason: SDUPTHER

## 2021-06-16 NOTE — PROGRESS NOTES
Per your last note\" Coronary artery disease. Status post three-vessel CABG in 2009. LIMA to LAD, SVG to diagonal SVG to RPDA. The SVG to PDA is known to be occluded on repeat cardiac catheterization in 2011. His native RCA has a 70% distal stenosis which has been managed medically. Patient underwent a pharmacologic nuclear stress test in August 2016, which showed an ejection fraction of 40% and a partial reversible distal posterior lateral defect consistent with his known coronary anatomy. Patient reports that he did not have typical anginal symptoms in the past.  I have recommended a repeat pharmacologic nuclear stress test later this year especially since he will be requiring possible extensive abdominal surgery once his tumor shrinks. He should remain on an aspirin and a beta-blocker.   He did not tolerate statins in the past.

## 2021-06-16 NOTE — TELEPHONE ENCOUNTER
----- Message from Omaira Mcgowan MD sent at 6/15/2021  4:27 PM EDT -----  Please let the patient know that his heart function has improved with medical therapy. His ejection fraction is now 50 to 55% which is lower limits of normal.  He should continue all of his current medications.  ----- Message -----  From: Kyler Olivares  Sent: 6/15/2021   1:38 PM EDT  To: Omaira Mcgowan MD    Per your last note\" Ischemic cardiomyopathy. EF 35-40% on his most recent echocardiogram in November 2017. He remains on appropriate medical therapy including a beta-blocker and ACE inhibitor. He has never required scheduled loop diuretics. He does not appear to have decompensated heart failure. I have recommended repeat an echocardiogram to reevaluate his LV function.

## 2021-06-28 ENCOUNTER — HOSPITAL ENCOUNTER (OUTPATIENT)
Age: 56
Discharge: HOME | End: 2021-06-28
Attending: INTERNAL MEDICINE
Payer: COMMERCIAL

## 2021-06-28 LAB — CREAT UR-MCNC: 0.8 MG/DL (ref 0.6–1.3)

## 2021-06-28 PROCEDURE — 70553 MRI BRAIN STEM W/O & W/DYE: CPT

## 2021-06-28 PROCEDURE — 74011636320 HC RX REV CODE- 636/320: Performed by: INTERNAL MEDICINE

## 2021-06-28 PROCEDURE — A9575 INJ GADOTERATE MEGLUMI 0.1ML: HCPCS | Performed by: INTERNAL MEDICINE

## 2021-06-28 PROCEDURE — 82565 ASSAY OF CREATININE: CPT

## 2021-06-28 RX ADMIN — GADOTERATE MEGLUMINE 20 ML: 376.9 INJECTION INTRAVENOUS at 12:33

## 2021-07-08 RX ORDER — RAMIPRIL 10 MG/1
CAPSULE ORAL
Qty: 90 CAPSULE | Refills: 3 | Status: SHIPPED | OUTPATIENT
Start: 2021-07-08 | End: 2022-06-20 | Stop reason: SDUPTHER

## 2021-08-02 ENCOUNTER — OFFICE VISIT (OUTPATIENT)
Dept: CARDIOLOGY CLINIC | Age: 56
End: 2021-08-02
Payer: COMMERCIAL

## 2021-08-02 VITALS
DIASTOLIC BLOOD PRESSURE: 78 MMHG | HEIGHT: 71 IN | SYSTOLIC BLOOD PRESSURE: 116 MMHG | OXYGEN SATURATION: 97 % | HEART RATE: 75 BPM | WEIGHT: 225 LBS | BODY MASS INDEX: 31.5 KG/M2

## 2021-08-02 DIAGNOSIS — I49.3 SYMPTOMATIC PVCS: ICD-10-CM

## 2021-08-02 DIAGNOSIS — I25.10 CORONARY ARTERY DISEASE INVOLVING NATIVE CORONARY ARTERY OF NATIVE HEART WITHOUT ANGINA PECTORIS: Primary | ICD-10-CM

## 2021-08-02 DIAGNOSIS — I10 ESSENTIAL HYPERTENSION: ICD-10-CM

## 2021-08-02 DIAGNOSIS — I25.5 ISCHEMIC CARDIOMYOPATHY: ICD-10-CM

## 2021-08-02 DIAGNOSIS — Z95.1 S/P CABG X 3: ICD-10-CM

## 2021-08-02 DIAGNOSIS — Z72.0 TOBACCO ABUSE: ICD-10-CM

## 2021-08-02 DIAGNOSIS — C49.A0 MALIGNANT GASTROINTESTINAL STROMAL TUMOR, UNSPECIFIED SITE (HCC): ICD-10-CM

## 2021-08-02 DIAGNOSIS — E78.5 DYSLIPIDEMIA: ICD-10-CM

## 2021-08-02 PROCEDURE — 99214 OFFICE O/P EST MOD 30 MIN: CPT | Performed by: INTERNAL MEDICINE

## 2021-08-02 PROCEDURE — 93000 ELECTROCARDIOGRAM COMPLETE: CPT | Performed by: INTERNAL MEDICINE

## 2021-08-02 NOTE — PROGRESS NOTES
HISTORY OF PRESENT ILLNESS  Kayla Amaya is a 60 y.o. male. Follow-up  Associated symptoms include shortness of breath. Pertinent negatives include no chest pain, no abdominal pain and no headaches. Shortness of Breath  Pertinent negatives include no fever, no headaches, no cough, no wheezing, no PND, no orthopnea, no chest pain, no vomiting, no abdominal pain, no rash, no leg swelling and no claudication. Patient presents for a follow-up office visit. He has a past medical history significant for known coronary artery disease with a prior myocardial infarction in the past to his lateral wall with stenting of his obtuse marginal branch. He also had a three-vessel bypass CABG in 2009. His long-standing hypertension, dyslipidemia, COPD with active tobacco use. Lastly, he is a history of a GIST tumor which was resected greater than 7 years ago. The patient [facility] pulmonary [facility]. He is found to be quite hypoxic at that time. He was started on anticoagulation. He underwent an echocardiogram during his hospital stay which showed a mildly depressed LV systolic function, EF 63-01% with a mildly dilated right ventricle with mildly reduced systolic function, but no obvious pulmonary hypertension. The patient remained on Xarelto for anticoagulation since that time. Patient underwent a pharmacologic nuclear stress test in August 2016 which showed a mild to moderately depressed LV function, EF 40% with a mostly fixed but partially reversible distal posterior lateral perfusion defect likely representing an area of prior infarct with yomi-infarct ischemia. This was not significantly changed compared to his known coronary anatomy, so he has been managed medically. Patient underwent repeat noninvasive cardiac testing in June 2021.   His nuclear stress test showed a fixed inferolateral defect, is consistent with prior infarct and ejection fraction in the 42% range. This was essentially unchanged compared to his stress test from 2016. His echocardiogram showed no significant valvular heart disease with a low normal LVEF. He was last seen in our office 6 months ago. Since last visit he still is smoking a pack and a half of cigarettes every day. He is back on his oral chemotherapy to try and shrink his tumor. He denies any new chest pain or worsening shortness of breath, no leg swelling or claudication. Past Medical History:   Diagnosis Date    CAD (coronary artery disease)     Carpal tunnel syndrome     COPD     Depression     H/O echocardiogram 11/2017    EF 35-40%, mild LVH, biatrial enlargement, mild TR, RVSP 41    Hemorrhoids     History of esophagitis     History of malignant gastrointestinal stromal tumor (GIST)     Hypercholesterolemia     Hypertension     [facility])     Pulmonary embolus ([facility] [address] June 2016    Bilateral    S/P CABG x 3 2009    LIMA to LAD, SVG to RPDA, SVG to diagonal    S/P cardiac catheterization November 2011    Patent GALLAGHER to LAD, patent SVG to diet, and occluded SVG to RPDA,  native LAD 85% proximal stenosis, left circumflex 20% diffuse disease, RCA distal 70% stenosis,, EF 45%    Secondary polycythemia       Current Outpatient Medications   Medication Sig Dispense Refill    ramipriL (ALTACE) 10 mg capsule take 1 capsule by mouth once daily 90 Capsule 3    imatinib (GLEEVEC) 400 mg tablet Take one tab by mouth daily with a large glass of water. 30 Tablet 4    hydroCHLOROthiazide (HYDRODIURIL) 25 mg tablet take 1 tablet by mouth once daily 30 Tab 6    metoprolol tartrate (LOPRESSOR) 25 mg tablet take 1 tablet by mouth twice a day 180 Tab 3    aspirin (ASPIRIN) 325 mg tablet Take 325 mg by mouth daily.  traZODone (DESYREL) 150 mg tablet Take 150 mg by mouth nightly.          No Known Allergies     Social History     Tobacco Use    Smoking status: Current Every Day Smoker     Packs/day: 1.50    Smokeless tobacco: Never Used   Substance Use Topics    Alcohol use: Yes     Comment: \"I haven't drank in 2 months, but I was drinking about 12 pk beer per week\"     Drug use: No       No family history on file. Review of Systems   Constitutional: Negative for chills, fever and weight loss. HENT: Negative for nosebleeds. Eyes: Negative for blurred vision and double vision. Respiratory: Positive for shortness of breath. Negative for cough and wheezing. Cardiovascular: Negative for chest pain, palpitations, orthopnea, claudication, leg swelling and PND. Gastrointestinal: Negative for abdominal pain, heartburn, nausea and vomiting. Genitourinary: Negative for dysuria and hematuria. Musculoskeletal: Negative for falls and myalgias. Skin: Negative for rash. Neurological: Negative for dizziness, focal weakness and headaches. Endo/Heme/Allergies: Does not bruise/bleed easily. Psychiatric/Behavioral: Negative for substance abuse. Visit Vitals  /78 (BP 1 Location: Left upper arm, BP Patient Position: Sitting, BP Cuff Size: Small adult)   Pulse 75   Ht 5' 11\" (1.803 m)   Wt 102.1 kg (225 lb)   SpO2 97%   BMI 31.38 kg/m²      Physical Exam  Constitutional:       Appearance: He is well-developed. HENT:      Head: Normocephalic and atraumatic. Eyes:      Conjunctiva/sclera: Conjunctivae normal.   Neck:      Vascular: No carotid bruit or JVD. Cardiovascular:      Rate and Rhythm: Normal rate and regular rhythm. Pulses: Normal pulses. Heart sounds: S1 normal and S2 normal. No murmur heard. No gallop. No S3 sounds. Pulmonary:      Breath sounds: Decreased breath sounds present. No wheezing or rales. Abdominal:      General: Bowel sounds are normal. There is no distension. Palpations: Abdomen is soft. There is mass. Tenderness: There is no abdominal tenderness. Musculoskeletal:         General: No tenderness.       Cervical back: Neck supple. Skin:     General: Skin is warm and dry. Neurological:      Mental Status: He is alert and oriented to person, place, and time. Psychiatric:         Behavior: Behavior normal.       EKG: Normal sinus rhythm,  Normal axis, borderline inferior Q waves, cannot exclude prior infarct, normal QTc interval, no ST segment changes concerning for ischemia. Occasional j PVCs and PACs. Compared to the previous EKG, PACs are now present. ASSESSMENT and PLAN    Coronary artery disease. Status post three-vessel CABG in 2009. LIMA to LAD, SVG to diagonal SVG to RPDA. The SVG to PDA is known to be occluded on repeat cardiac catheterization in 2011. His native RCA has a 70% distal stenosis which has been managed medically. Patient recently nderwent a repeat pharmacologic nuclear stress test in June 2021, which showed an ejection fraction of 42 % and a primarily fixed posterior lateral defect consistent with his known coronary anatomy. Patient reports that he did not have typical anginal symptoms in the past.  No new symptoms concerning for angina. He remains on a full dose aspirin, beta-blocker, but has been intolerant to multiple statins in the past.    Ischemic cardiomyopathy. EF 42 to 50% on recent noninvasive cardiac testing. This has been fairly stable over the past 4 to 5 years. No evidence of decompensated heart failure. He remains on a beta-blocker and an ACE inhibitor, both of which I would continue. He is never required any loop diuretics. Hypertension. Patient blood pressure is reasonably well-controlled on his current medical regimen. All of which I would continue. Dyslipidemia. Patient did not tolerate simvastatin or Crestor. He may be a candidate for Repatha in the future. Tobacco use disorder. Patient is now smoking a pack and a half of cigarettes a day. He is a longtime smoker and states at this point he is not interested in quitting.     History of bilateral pulmonary embolus. This occurred in June 2016. Patient was managed with Xarelto for oral anticoagulation up until recently which was stopped by his hematologist/oncologist.    GIST tumor. Patient continues to take oral chemotherapy. This is followed closely by his oncologist.    Followup in 6 months, sooner if needed.

## 2021-08-02 NOTE — PROGRESS NOTES
Peace Rehman presents today for   Chief Complaint   Patient presents with    Follow-up     6 month follow up       Peace Rehman preferred language for health care discussion is english/other. Is someone accompanying this pt? no    Is the patient using any DME equipment during [address]? no    Depression Screening:  3 most recent PHQ Screens 8/2/2021   Little interest or pleasure in doing things Not at all   Feeling down, depressed, irritable, or hopeless Not at all   Total Score PHQ 2 0       Learning Assessment:  Learning Assessment 8/2/2021   PRIMARY LEARNER Patient   PRIMARY LANGUAGE ENGLISH   LEARNER PREFERENCE PRIMARY DEMONSTRATION   ANSWERED BY patient   RELATIONSHIP SELF       Abuse Screening:  Abuse Screening Questionnaire 8/2/2021   Do you ever feel afraid of your partner? N   Are you in a relationship with someone who physically or mentally threatens you? N   Is it safe for you to go home? Y       Fall Risk  Fall Risk Assessment, last 12 mths 2/1/2021   Able to walk? Yes   Fall in past 12 months? 0   Do you feel unsteady? 0   Are you worried about falling 0           Pt currently taking Anticoagulant therapy? no    Pt currently taking Antiplatelet therapy ?  mg once a day      Coordination of Care:  1. Have you been to the ER, urgent care clinic since your last visit? Hospitalized since your last visit? no    2. Have you seen or consulted any other health care providers outside of the [address] [place] [address] since your last visit? Include any pap smears or colon screening.  no

## 2021-08-03 PROBLEM — I10 HYPERTENSION: Status: RESOLVED | Noted: 2021-08-03 | Resolved: 2021-08-03

## 2021-08-03 PROBLEM — I25.10 CAD (CORONARY ARTERY DISEASE): Status: RESOLVED | Noted: 2021-08-03 | Resolved: 2021-08-03

## 2021-11-22 RX ORDER — HYDROCHLOROTHIAZIDE 25 MG/1
TABLET ORAL
Qty: 30 TABLET | Refills: 6 | Status: SHIPPED | OUTPATIENT
Start: 2021-11-22 | End: 2022-06-17

## 2021-12-02 DIAGNOSIS — C49.A0 GASTROINTESTINAL STROMAL TUMOR (HCC): Primary | ICD-10-CM

## 2021-12-29 ENCOUNTER — TRANSCRIBE ORDER (OUTPATIENT)
Dept: SCHEDULING | Age: 56
End: 2021-12-29

## 2021-12-29 DIAGNOSIS — C49.A0 GASTROINTESTINAL STROMAL TUMOR (HCC): Primary | ICD-10-CM

## 2022-01-04 ENCOUNTER — NURSE NAVIGATOR (OUTPATIENT)
Dept: OTHER | Age: 57
End: 2022-01-04

## 2022-01-04 NOTE — NURSE NAVIGATOR
Follow up call to patient who stated that he canceled appointment with  until after CT scan on 1/10/22 an F/U with surgeon-Dr. Melisa Ferrari on 1/12/22-will follow up with pt to reschedule appointment with Dr. Jessica Orellana and labs after surgeon's appointment.

## 2022-01-10 ENCOUNTER — HOSPITAL ENCOUNTER (OUTPATIENT)
Dept: CT IMAGING | Age: 57
Discharge: HOME | End: 2022-01-10
Payer: COMMERCIAL

## 2022-01-10 DIAGNOSIS — C49.A0 GASTROINTESTINAL STROMAL TUMOR (HCC): ICD-10-CM

## 2022-01-10 LAB — CREAT UR-MCNC: 1 MG/DL (ref 0.6–1.3)

## 2022-01-10 PROCEDURE — 82565 ASSAY OF CREATININE: CPT

## 2022-01-10 PROCEDURE — 74011000636 HC RX REV CODE- 636

## 2022-01-10 PROCEDURE — 74177 CT ABD & PELVIS W/CONTRAST: CPT

## 2022-01-10 RX ORDER — IMATINIB MESYLATE 400 MG/1
TABLET, FILM COATED ORAL
Qty: 30 TABLET | Refills: 4 | Status: SHIPPED | OUTPATIENT
Start: 2022-01-10 | End: 2022-05-24 | Stop reason: ALTCHOICE

## 2022-01-10 RX ADMIN — IOPAMIDOL 100 ML: 612 INJECTION, SOLUTION INTRAVENOUS at 09:56

## 2022-01-31 ENCOUNTER — NURSE NAVIGATOR (OUTPATIENT)
Dept: OTHER | Age: 57
End: 2022-01-31

## 2022-02-03 ENCOUNTER — OFFICE VISIT (OUTPATIENT)
Dept: CARDIOLOGY CLINIC | Age: 57
End: 2022-02-03
Payer: COMMERCIAL

## 2022-02-03 VITALS
HEIGHT: 71 IN | OXYGEN SATURATION: 95 % | BODY MASS INDEX: 33.6 KG/M2 | WEIGHT: 240 LBS | DIASTOLIC BLOOD PRESSURE: 88 MMHG | SYSTOLIC BLOOD PRESSURE: 136 MMHG | HEART RATE: 58 BPM

## 2022-02-03 DIAGNOSIS — I25.5 ISCHEMIC CARDIOMYOPATHY: ICD-10-CM

## 2022-02-03 DIAGNOSIS — Z95.1 S/P CABG X 3: ICD-10-CM

## 2022-02-03 DIAGNOSIS — I10 ESSENTIAL HYPERTENSION WITH GOAL BLOOD PRESSURE LESS THAN 140/90: ICD-10-CM

## 2022-02-03 DIAGNOSIS — E78.5 DYSLIPIDEMIA: ICD-10-CM

## 2022-02-03 DIAGNOSIS — I25.10 CORONARY ARTERY DISEASE INVOLVING NATIVE HEART WITHOUT ANGINA PECTORIS, UNSPECIFIED VESSEL OR LESION TYPE: Primary | ICD-10-CM

## 2022-02-03 DIAGNOSIS — Z72.0 TOBACCO ABUSE: ICD-10-CM

## 2022-02-03 PROCEDURE — 99214 OFFICE O/P EST MOD 30 MIN: CPT | Performed by: INTERNAL MEDICINE

## 2022-02-03 PROCEDURE — 93000 ELECTROCARDIOGRAM COMPLETE: CPT | Performed by: INTERNAL MEDICINE

## 2022-02-03 RX ORDER — METOPROLOL TARTRATE 25 MG/1
25 TABLET, FILM COATED ORAL 2 TIMES DAILY
Qty: 180 TABLET | Refills: 3 | Status: SHIPPED | OUTPATIENT
Start: 2022-02-03 | End: 2022-02-07

## 2022-02-03 NOTE — PROGRESS NOTES
HISTORY OF PRESENT ILLNESS  Andres Catherine is a 60 y.o. male. Follow-up  Associated symptoms include shortness of breath. Pertinent negatives include no chest pain, no abdominal pain and no headaches. Shortness of Breath  Pertinent negatives include no fever, no headaches, no cough, no wheezing, no PND, no orthopnea, no chest pain, no vomiting, no abdominal pain, no rash, no leg swelling and no claudication. Patient presents for a follow-up office visit. He has a past medical history significant for known coronary artery disease with a prior myocardial infarction in the past to his lateral wall with stenting of his obtuse marginal branch. He also had a three-vessel bypass CABG in 2009. His long-standing hypertension, dyslipidemia, COPD with active tobacco use. Lastly, he is a history of a GIST tumor which was resected greater than 7 years ago. [facility]. He is found to be quite hypoxic at that time. He was started on anticoagulation. He underwent an echocardiogram during his hospital stay which showed a mildly depressed LV systolic function, EF 42-23% with a mildly dilated right ventricle with mildly reduced systolic function, but no obvious pulmonary hypertension. The patient remained on Xarelto for anticoagulation since that time. Patient underwent a pharmacologic nuclear stress test in August 2016 which showed a mild to moderately depressed LV function, EF 40% with a mostly fixed but partially reversible distal posterior lateral perfusion defect likely representing an area of prior infarct with yomi-infarct ischemia. This was not significantly changed compared to his known coronary anatomy, so he has been managed medically. Patient underwent repeat noninvasive cardiac testing in June 2021.   His nuclear stress test showed a fixed inferolateral defect, is consistent with prior infarct and ejection fraction in the 42% range. This was essentially unchanged compared to his stress test from 2016. His echocardiogram showed no significant valvular heart disease with a low normal LVEF. He was last seen in our office 6 months ago. Since last visit he still is smoking a pack and a half of cigarettes every day. He reports he is in the process of being scheduled for surgical resection of his recurrent GIST tumor. This may be [facility] at [facility].  Since last visit, he states been feeling about the same. No significant change in his chronic exertional dyspnea. No chest pain, leg swelling or claudication. Past Medical History:   Diagnosis Date    CAD (coronary artery disease)     Carpal tunnel syndrome     COPD     Depression     H/O echocardiogram 11/2017    EF 35-40%, mild LVH, biatrial enlargement, mild TR, RVSP 41    Hemorrhoids     History of esophagitis     History of malignant gastrointestinal stromal tumor (GIST)     Hypercholesterolemia     Hypertension     [facility])     Pulmonary embolus ([address] June 2016    Bilateral    S/P CABG x 3 2009    LIMA to LAD, SVG to RPDA, SVG to diagonal    S/P cardiac catheterization November 2011    Patent GALLAGHER to LAD, patent SVG to diet, and occluded SVG to RPDA,  native LAD 85% proximal stenosis, left circumflex 20% diffuse disease, RCA distal 70% stenosis,, EF 45%    Secondary polycythemia       Current Outpatient Medications   Medication Sig Dispense Refill    metoprolol tartrate (LOPRESSOR) 25 mg tablet Take 1 Tablet by mouth two (2) times a day. 180 Tablet 3    imatinib (GLEEVEC) 400 mg tablet Take one tab by mouth daily with a large glass of water.  30 Tablet 4    hydroCHLOROthiazide (HYDRODIURIL) 25 mg tablet take 1 tablet by mouth once daily 30 Tablet 6    ramipriL (ALTACE) 10 mg capsule take 1 capsule by mouth once daily 90 Capsule 3    aspirin (ASPIRIN) 325 mg tablet Take 325 mg by mouth daily.  traZODone (DESYREL) 150 mg tablet Take 150 mg by mouth nightly. No Known Allergies     Social History     Tobacco Use    Smoking status: Current Every Day Smoker     Packs/day: 1.50    Smokeless tobacco: Never Used   Substance Use Topics    Alcohol use: Yes     Comment: \"I haven't drank in 2 months, but I was drinking about 12 pk beer per week\"     Drug use: No       No family history on file. Review of Systems   Constitutional: Negative for chills, fever and weight loss. HENT: Negative for nosebleeds. Eyes: Negative for blurred vision and double vision. Respiratory: Positive for shortness of breath. Negative for cough and wheezing. Cardiovascular: Negative for chest pain, palpitations, orthopnea, claudication, leg swelling and PND. Gastrointestinal: Negative for abdominal pain, heartburn, nausea and vomiting. Genitourinary: Negative for dysuria and hematuria. Musculoskeletal: Negative for falls and myalgias. Skin: Negative for rash. Neurological: Negative for dizziness, focal weakness and headaches. Endo/Heme/Allergies: Does not bruise/bleed easily. Psychiatric/Behavioral: Negative for substance abuse. Visit Vitals  /88 (BP 1 Location: Left upper arm, BP Patient Position: Sitting, BP Cuff Size: Adult)   Pulse (!) 58   Ht 5' 11\" (1.803 m)   Wt 108.9 kg (240 lb)   SpO2 95%   BMI 33.47 kg/m²      Physical Exam  Constitutional:       Appearance: He is well-developed. HENT:      Head: Normocephalic and atraumatic. Eyes:      Conjunctiva/sclera: Conjunctivae normal.   Neck:      Vascular: No carotid bruit or JVD. Cardiovascular:      Rate and Rhythm: Regular rhythm. Bradycardia present. Pulses: Normal pulses. Heart sounds: S1 normal and S2 normal. No murmur heard. No gallop. No S3 sounds. Pulmonary:      Breath sounds: Decreased air movement present. No decreased breath sounds, wheezing, rhonchi or rales.    Abdominal: General: Bowel sounds are normal. There is no distension. Palpations: Abdomen is soft. There is mass. Tenderness: There is no abdominal tenderness. Musculoskeletal:         General: No tenderness. Cervical back: Neck supple. Skin:     General: Skin is warm and dry. Neurological:      Mental Status: He is alert and oriented to person, place, and time. Psychiatric:         Behavior: Behavior normal.       EKG: Sinus bradycardia, normal axis,  inferior Q waves,  normal QTc interval, no ST segment changes concerning for ischemia. Compared to the previous EKG, PACs and PVCs are no longer present. ASSESSMENT and PLAN    Coronary artery disease. Status post three-vessel CABG in 2009. LIMA to LAD, SVG to diagonal SVG to RPDA. The SVG to PDA is known to be occluded on repeat cardiac catheterization in 2011. His native RCA has a 70% distal stenosis which has been managed medically. Patient last underwent a repeat pharmacologic nuclear stress test in June 2021, which showed an ejection fraction of 42 % and a primarily fixed posterior lateral defect consistent with his known coronary anatomy. Patient reports that he did not have typical anginal symptoms in the past.  No new symptoms concerning for angina. He remains on a full dose aspirin, beta-blocker, but has been intolerant to multiple statins in the past.  He can continue his current medical regimen. Ischemic cardiomyopathy. EF 42 to 50% on recent noninvasive cardiac testing. This has been fairly stable over the past 4 to 5 years. No evidence of decompensated heart failure. He remains on a beta-blocker and an ACE inhibitor, both of which I would continue. He is never required any loop diuretics. Hypertension. Patient blood pressure is reasonably well-controlled on his current medical regimen. All of which I would continue. Dyslipidemia. Patient did not tolerate simvastatin or Crestor.   He may be a candidate for Repatha in the future. Tobacco use disorder. Patient is now smoking a pack and a half of cigarettes a day. He is a longtime smoker and states at this point he is not interested in quitting. History of bilateral pulmonary embolus. This occurred in June 2016. Patient was managed with Xarelto for oral anticoagulation up until recently which was stopped by his hematologist/oncologist.    GIST tumor. Patient continues to take oral chemotherapy. This is followed closely by his oncologist.  He states he is in the process of being scheduled for repeat surgical resection probably next month. From a cardiac standpoint he will be at moderate risk for the surgical procedure. He can stop his aspirin for a week perioperatively if necessary. I would continue his metoprolol perioperatively. Followup in 6 months, sooner if needed.

## 2022-02-03 NOTE — PROGRESS NOTES
Jose Luis Irby presents today for   Chief Complaint   Patient presents with    Follow-up     6 month follow up- no complaints        Jose Luis Irby preferred language for health care discussion is english/other. Is someone accompanying this pt? no    Is the patient using any DME equipment during [address]? no    Depression Screening:  3 most recent PHQ Screens 2/3/2022   Little interest or pleasure in doing things Not at all   Feeling down, depressed, irritable, or hopeless Not at all   Total Score PHQ 2 0       Learning Assessment:  Learning Assessment 8/2/2021   PRIMARY LEARNER Patient   PRIMARY LANGUAGE ENGLISH   LEARNER PREFERENCE PRIMARY DEMONSTRATION   ANSWERED BY patient   RELATIONSHIP SELF       Abuse Screening:  Abuse Screening Questionnaire 2/3/2022   Do you ever feel afraid of your partner? N   Are you in a relationship with someone who physically or mentally threatens you? N   Is it safe for you to go home? Y       Fall Risk  Fall Risk Assessment, last 12 mths 2/1/2021   Able to walk? Yes   Fall in past 12 months? 0   Do you feel unsteady? 0   Are you worried about falling 0       Pt currently taking Anticoagulant therapy? no    Coordination of Care:  1. Have you been to the ER, urgent care clinic since your last visit? Hospitalized since your last visit? no    2. Have you seen or consulted any other health care providers outside of the [address] [place] [address] since your last visit? Include any pap smears or colon screening.  no

## 2022-02-07 RX ORDER — METOPROLOL TARTRATE 25 MG/1
TABLET, FILM COATED ORAL
Qty: 180 TABLET | Refills: 3 | Status: SHIPPED | OUTPATIENT
Start: 2022-02-07

## 2022-03-19 PROBLEM — E78.5 DYSLIPIDEMIA: Status: ACTIVE | Noted: 2017-11-07

## 2022-03-19 PROBLEM — I49.3 SYMPTOMATIC PVCS: Status: ACTIVE | Noted: 2021-02-01

## 2022-03-19 PROBLEM — I25.5 ISCHEMIC CARDIOMYOPATHY: Status: ACTIVE | Noted: 2017-04-17

## 2022-05-24 ENCOUNTER — OFFICE VISIT (OUTPATIENT)
Dept: ONCOLOGY | Age: 57
End: 2022-05-24
Payer: COMMERCIAL

## 2022-05-24 VITALS
BODY MASS INDEX: 30.8 KG/M2 | DIASTOLIC BLOOD PRESSURE: 97 MMHG | HEART RATE: 59 BPM | OXYGEN SATURATION: 98 % | RESPIRATION RATE: 18 BRPM | HEIGHT: 71 IN | WEIGHT: 220 LBS | SYSTOLIC BLOOD PRESSURE: 137 MMHG

## 2022-05-24 DIAGNOSIS — G44.021 INTRACTABLE CHRONIC CLUSTER HEADACHE: ICD-10-CM

## 2022-05-24 DIAGNOSIS — I26.99 PULMONARY EMBOLISM, OTHER, UNSPECIFIED CHRONICITY, UNSPECIFIED WHETHER ACUTE COR PULMONALE PRESENT (HCC): ICD-10-CM

## 2022-05-24 DIAGNOSIS — R19.00 ABDOMINAL MASS, UNSPECIFIED ABDOMINAL LOCATION: ICD-10-CM

## 2022-05-24 DIAGNOSIS — C49.A0 GASTROINTESTINAL STROMAL TUMOR (HCC): Primary | ICD-10-CM

## 2022-05-24 DIAGNOSIS — C49.A0 GASTROINTESTINAL STROMAL TUMOR (HCC): ICD-10-CM

## 2022-05-24 DIAGNOSIS — D75.1 POLYCYTHEMIA: ICD-10-CM

## 2022-05-24 DIAGNOSIS — I26.99 PULMONARY EMBOLISM WITHOUT ACUTE COR PULMONALE, UNSPECIFIED CHRONICITY, UNSPECIFIED PULMONARY EMBOLISM TYPE (HCC): ICD-10-CM

## 2022-05-24 PROCEDURE — 99215 OFFICE O/P EST HI 40 MIN: CPT | Performed by: INTERNAL MEDICINE

## 2022-05-24 NOTE — PROGRESS NOTES
Hematology/Oncology  Progress Note    Name: Jen Ramirez  Date: 2022  : 1965    RADHA Sol     Mr. Dimas Herbert is a 60y.o. year old male who was seen for recurrent GIST. Subjective:     Mr. Dimas Herbert is a 24-year-old man who was diagnosed with a pulmonary embolus in May of 2016. Patient was being followed by Dr. Chepe Peterson who retired. He states that he is taking Xarelto 20mg PO daily. He admits smoking 1 ppd from 1 1/2ppd and states he is willing to cut back some more until he totally quits. He went to abdominal wall biopsy recently which confirmed recurrent GIST. He has hx of GIST in . He reported he was taking Gleevec for about one year after surgery at that time. He has started [facility] since 2021. He denied significant pain or vomiting or altered bowel movements. He is an active smoker. He did not receive his phlebotomy for months for his polycythemia. Since his last visit on 2021, the patient did not keep follow up visit with our clinic. The patient reported he took [facility] as 7 days on/3 days off (due to worsening headache) prior to his surgical resection. He stated she stopped Gleevec about 2 months before surgery. He was off Xarelto since started [facility]. The patient otherwise has no other complaints. Denied fever, chills, night sweat, unintentional weight loss, skin lumps or bumps, acute bleeding or bruising issues. No acute bleeding, blood in stool, dark stool, melena, hematochezia, hemoptysis, dark urine, or easily bruising. Denied headache, acute vision change, dizziness, chest pain, worsen shortness of breath, palpitation, productive cough, vomiting, abdominal pain, altered bowel habits, dysuria, new bone pain or back pain, focal numbness or weakness. Past medical history, family history, and social history: these were reviewed and remains unchanged.     Past Medical History:   Diagnosis Date    CAD (coronary artery disease)     Carpal tunnel syndrome     COPD     Depression     H/O echocardiogram 11/2017    EF 35-40%, mild LVH, biatrial enlargement, mild TR, RVSP 41    Hemorrhoids     History of esophagitis     History of malignant gastrointestinal stromal tumor (GIST)     Hypercholesterolemia     Hypertension     Myocardial infarction [facility])     Pulmonary embolus [facility]) June 2016    Bilateral    S/P CABG x 3 2009    LIMA to LAD, SVG to RPDA, SVG to diagonal    S/P cardiac catheterization November 2011    Patent GALLAGHER to LAD, patent SVG to diet, and occluded SVG to RPDA,  native LAD 85% proximal stenosis, left circumflex 20% diffuse disease, RCA distal 70% stenosis,, EF 45%    Secondary polycythemia      Past Surgical History:   Procedure Laterality Date    HX CORONARY STENT PLACEMENT      HX CYST REMOVAL      HX GI      tumor near stomach    HX HEENT      deviated septum    HX HEMORRHOIDECTOMY      HX ORTHOPAEDIC      knee injury right    HX OTHER SURGICAL      resection of mesenteric mass    MA CARDIAC SURG PROCEDURE UNLIST      stent     Social History     Socioeconomic History    Marital status:      Spouse name: Not on file    Number of children: Not on file    Years of education: Not on file    Highest education level: Not on file   Occupational History    Not on file   Tobacco Use    Smoking status: Current Every Day Smoker     Packs/day: 1.50    Smokeless tobacco: Never Used   Substance and Sexual Activity    Alcohol use: Yes     Comment: \"I haven't drank in 2 months, but I was drinking about 12 pk beer per week\"     Drug use: No    Sexual activity: Not on file   Other Topics Concern    Not on file   Social History Narrative    Not on file     Social Determinants of Health     Financial Resource Strain:     Difficulty of Paying Living Expenses: Not on file   Food Insecurity:     Worried About Running Out of Food in the Last Year: Not on file    Ruben of Food in the Last Year: Not on file Transportation Needs:     Lack of Transportation (Medical): Not on file    Lack of Transportation (Non-Medical): Not on file   Physical Activity:     Days of Exercise per Week: Not on file    Minutes of Exercise per Session: Not on file   Stress:     Feeling of Stress : Not on file   Social Connections:     Frequency of Communication with Friends and Family: Not on file    Frequency of Social Gatherings with Friends and Family: Not on file    Attends Latter day Services: Not on file    Active Member of [address] [place] [address] [employer] or Organizations: Not on file    Attends Club or Organization Meetings: Not on file    Marital Status: Not on file   Intimate Partner Violence:     Fear of Current or Ex-Partner: Not on file    Emotionally Abused: Not on file    Physically Abused: Not on file    Sexually Abused: Not on file   Housing Stability:     Unable to Pay for Housing in the Last Year: Not on file    Number of [place] in the Last Year: Not on file    Unstable Housing in the Last Year: Not on file     No family history on file. Current Outpatient Medications   Medication Sig Dispense Refill    metoprolol tartrate (LOPRESSOR) 25 mg tablet take 1 tablet by mouth twice a day 180 Tablet 3    imatinib (GLEEVEC) 400 mg tablet Take one tab by mouth daily with a large glass of water. 30 Tablet 4    hydroCHLOROthiazide (HYDRODIURIL) 25 mg tablet take 1 tablet by mouth once daily 30 Tablet 6    ramipriL (ALTACE) 10 mg capsule take 1 capsule by mouth once daily 90 Capsule 3    aspirin (ASPIRIN) 325 mg tablet Take 325 mg by mouth daily.  traZODone (DESYREL) 150 mg tablet Take 150 mg by mouth nightly. Review of Systems   Constitutional: Positive for malaise/fatigue. Negative for chills, diaphoresis, fever and weight loss. Respiratory: Negative for cough, hemoptysis, shortness of breath and wheezing. Cardiovascular: Negative for chest pain, palpitations and leg swelling.    Gastrointestinal: Negative for abdominal pain, diarrhea, heartburn, nausea and vomiting. Genitourinary: Negative for dysuria, frequency, hematuria and urgency. Musculoskeletal: Negative for joint pain and myalgias. Skin: Negative for itching and rash. Neurological: Negative for dizziness, seizures, weakness and headaches. Psychiatric/Behavioral: Negative for depression. The patient does not have insomnia. Objective:     Visit Vitals  BP (!) 137/97   Pulse (!) 59   Resp 18   Ht 5' 11\" (1.803 m)   Wt 99.8 kg (220 lb)   SpO2 98%   BMI 30.68 kg/m²       ECOG Performance Status (grade): 0  0 - able to carry on all pre-disease activity w/out restriction  1 - restricted but able to carry out light work  2 - ambulatory and can self- care but unable to carry out work  3 - bed or chair >50% of waking hours  4 - completely disable, total care, confined to bed or chair    Physical Exam  Constitutional:       General: He is not in acute distress. HENT:      Head: Normocephalic and atraumatic. Eyes:      Pupils: Pupils are equal, round, and reactive to light. Cardiovascular:      Pulses: Normal pulses. Heart sounds: Normal heart sounds. No murmur heard. Pulmonary:      Effort: Pulmonary effort is normal. No respiratory distress. Breath sounds: Normal breath sounds. Abdominal:      General: Bowel sounds are normal. There is no distension. Palpations: Abdomen is soft. There is no mass. Tenderness: There is no abdominal tenderness. There is no guarding. Musculoskeletal:         General: No swelling or tenderness. Cervical back: Neck supple. No rigidity. Lymphadenopathy:      Cervical: No cervical adenopathy. Skin:     General: Skin is warm. Findings: No rash. Neurological:      Mental Status: He is alert and oriented to person, place, and time. Mental status is at baseline. Cranial Nerves: No cranial nerve deficit.    Psychiatric:         Mood and Affect: Mood normal. Diagnostics:      No results found for this or any previous visit (from the past 96 hour(s)). Imaging:  No results found for this or any previous visit. Results for orders placed during the hospital encounter of 05/31/16    XR SWALLOW FUNC VIDEO    Narrative  Modified barium swallow with video recording:        INDICATION:    Dysphagia. The study has been performed under supervision of speech therapist.    The patient swallowed thin liquid but there is been deep penetration almost to  the level of vocal cord. Then patient swallowed thin liquid food with small sips  and with chin-tucked position. At  this time, there has been no penetration or  aspiration of thin liquid into larynx. Then patient swallowed barium tablet. There has  been penetration of thin liquid used for swallowing tablet into larynx. There  is been no evidence of stagnation of tablet in hypopharynx or in upper  esophagus. Patient swallowed puree and cracker normally without penetration or  nasopharyngeal into larynx. Fluoroscopy time utilized is 48 seconds. No fluoroscopy images is obtained. Impression  : At first there is penetration of thin liquid into larynx as described. But when  the patient swallowed thin liquid in small sips with chin tuck position, there  is been no penetration or aspiration into larynx. Patient swallowed barium tablet but there has  been penetration of the liquid  used with a tablet. Swallowing of puree and cracker has been normal.      Results for orders placed during the hospital encounter of 01/10/22    CT ABD PELV W CONT    Narrative  EXAM:  CT Abdomen-Pelvis with Contrast.    CLINICAL INDICATION:  Gastrointestinal stromal tumor (C49.A0). COMPARISON:  04/07/21, 09/18/20. TECHNIQUE:    - Helical volumetric CT imaging of the abdomen and pelvis is performed following  IV contrast administration.   Coronal and sagittal multiplanar reconstruction  images are generated for improved anatomic delineation.  - IV contrast 100 mL Isovue-300. - All CT scans at this facility are performed using dose optimization technique  as appropriate to the performed exam, to include automated exposure control,  adjustment of the mA and/or kV according to patient's size (Including  appropriate matching for site-specific examinations), or use of iterative  reconstruction technique. FINDINGS:    Lung Bases:  Slightly heterogeneous lung attenuation pattern which may be  secondary to air trapping vs. emphysematous changes. Similar pattern compared  to prior studies. No airspace opacities. Liver: There are multiple tiny hepatic hypodensities, overall similar to prior  studies. The largest of the hepatic hypodensities is identified in the left  lobe adjacent to the umbilical fissure measuring about 1.0 x 0.7 cm (axial image  18). Gallbladder, Spleen, Pancreas, Kidneys:  Unremarkable. Adrenal Glands:  Stable 1.7 x 1.4 cm right adrenal gland nodule. Stable 1.7 x  1.3 cm and 1.4 x 1.3 cm left adrenal gland nodules. GI Tract, Abdominal Wall, Peritoneal Cavity:    - Extensive intra-abdominal and extra-abdominal masses. The largest of the  intra-abdominal mass is identified at about the level of the umbilicus measuring  about 12.0 x 6.9 cm (axial #53). This mass is decreased compared to 04/07/21,  previously measuring 218.0 x 10.0 cm. The left paramedian iliac fossa region  mass currently measures about 9.6 x 8.1 cm vs. 10.0 x 9.1 cm on 04/07/21. Some  of the masses to demonstrate distinct interval increase in size, i.e. upper  abdomen midline region mass as into the abdominal wall musculature currently  measures about 7.2 x 4.8 cm (axial #26) vs. 6.2 x 4.3 cm on 04/07/21. Subcutaneous mass in the right upper quadrant paramedian region measures about  5.7 x 3.4 cm, increased from about 5.0 x 2.9 cm on 04/07/21.   This mass may be  the most readily amenable target to site for biopsy if tissue sampling is  needed. The overall total tumor burden does appear decreased compared to  04/07/21. - Unremarkable stomach and small bowel. Normal appendix. No acute colitis or  diverticulitis is detected along the colon. Nodes:  No para-aortic or pericaval adenopathy. Vascular:  No acute abnormalities. Bladder:  Underdistended. Slight bladder wall thickening. Grossly unremarkable  prostate. Bones:  Unremarkable. Impression  1. Extensive intraabdominal and abdominal wall masses. Some of the lesions are  decreased in extent whereas some are increased in size. 2.  Stable multiple tiny hepatic hypodensities, favor hepatic cysts. 3.  Stable adrenal gland nodules. Favor benign process based on stability. PATHOLOGY  12/3/2020 ANTERIOR ABDOMINAL WALL MASS, BIOPSY:   GASTROINTESTINAL STROMAL TUMOR. GASTROINTESTINAL STROMAL TUMOR (GIST): Biopsy   SPECIMEN   Procedure: Core needle biopsy   TUMOR   Tumor Site: Anterior abdominal wall   Histologic Type: Gastrointestinal stromal tumor, spindle cell type   Histologic Grade: G1: Low grade; mitotic rate <= 5 / 5 mm2   Mitotic Rate: 1 mitoses per 5 mm2   Necrosis: Not identified   Risk Assessment: Cannot be determined: Recurrent   Treatment Effect: No known prebiopsy therapy     DIAGNOSIS COMMENT:   Given the patient's history of gastrointestinal stromal tumor, the findings are consistent with recurrent disease.     4/5/2022  Surgical Pathology Report                         Case: [id number]                                   Authorizing Provider: Edwardo Jessica MD      Collected:           04/05/2022 0924               Ordering Location:     [facility]    Received:            04/06/2022 [address] [place] [address] [facility]                                                     Pathologist:           Flako Altamirano MD                                                             Specimens:   A) - Epigastrium, Subcutaneous epigastric tumor in 10% Formalin                                      B) - Abdomen, Subcutaneous mass mid-abdomen in 10% Formalin                                          C) - Abdomen, Subcutaneous mass mid-abdomen #2 in 10% Formalin                                      D) - Abdomen, Intra abdominal mass mid-abdomen in 10% Formalin                                      E) - Abdomen, Intra abdominal mass mid-abdomen #2 in 10% Formalin                                    F) - Abdomen, Intra abdominal mass mid-abdomen #3 in 10% Formalin                                    G) - Abdomen, Intra abdominal mass mid-abdomen #4 in 10% Formalin                                    H) - Abdomen, Intra abdominal mass mid-abdomen #5 in 10% Formalin                                    I) - Tissue, Pericolonic mass in 10% Formalin                                                        J) - Suprapubic, suprapubic mass in 10% formalin                                                    K) - Suprapubic, suprapubic nodule mass in 10% formalin                                              L) - Abdomen, intra abdominal mass in 10% formalin                                                  M) - Suprapubic, suprapubic mass in #2 in 10% formalin                                              N) - Suprapubic, suprapubic mass #3 in 10% formalin                                                  O) - Sigmoid, Sigmoid nodule in 10% formalin                                                        P) - Nodule, left lower quadrant nodule in 10% formalin.                                            Q) - Tissue, left peritoneal nodule in 10% formalin.                                                R) - Abdominal Wall, abdominal wall mass in 10% formalin.                                            S) - Colon, colon mass in 10% formalin.                                                              T) - Mesentery, mesenteric mass in 10% formalin.                                                    U) - Omentum, omental nodule in 10% formalin.                                                        V) - Mesentery, mesenteric nodule in 10% formalin.                                                  W) - Tissue, right upper quadrant mass in 10% formalin.                                              X) - Diaphragm, Right diaphragm mass in 10% formalin.                                                Y) - Small Bowel, Small bowel                                                                        Z) - Diaphragm, Left Diaphragm                                                                      AA) - Nodule, subcutaneous nodule in 10% formalin.                                       Diagnosis     A.  SUBCUTANEOUS EPIGASTRIC TUMOR, EXCISION:          -  METASTATIC GASTROINTESTINAL STROMAL TUMOR (GIST), 2.9 CM.     B.  SUBCUTANEOUS MASS, MID ABDOMEN, EXCISION:          -  METASTATIC GASTROINTESTINAL STROMAL TUMOR (GIST), 5.9 CM WITH HEMORRHAGE AND THE TUMOR NECROSIS.     C.  SUBCUTANEOUS MASS, MID ABDOMEN #2, EXCISION:          -  METASTATIC GASTROINTESTINAL STROMAL TUMOR (GIST), 7.5 CM.     D. INTRAABDOMINAL MASS, MID ABDOMEN, EXCISION:          -  METASTATIC GASTROINTESTINAL STROMAL TUMOR (GIST), 9.2 CM WITH FOCAL HEMORRHAGE AND NECROSIS.     E.  INTRA-ABDOMINAL MASS, MID ABDOMEN, #2, EXCISION:          -  METASTATIC GASTROINTESTINAL STROMAL TUMOR (GIST), 3.7 CM WITH HEMORRHAGE.     F. INTRA-ABDOMINAL MASS, MID ABDOMEN #3, EXCISION:           -  METASTATIC GASTROINTESTINAL STROMAL TUMOR (GIST), 4.3 CM WITH TUMOR NECROSIS AND HEMORRHAGE.     G. INTRA-ABDOMINAL MASS, MID ABDOMEN #4, EXCISION:           -  FOCI OF METASTATIC GASTROINTESTINAL STROMAL TUMOR (GIST) , 1.7 AND 7.5 CM, FOCALLY HEMORRHAGIC AND NECROTIC.     H.   INTRA-ABDOMINAL MASS, MID ABDOMEN #5, EXCISION:            -  METASTATIC GASTROINTESTINAL STROMAL TUMOR (GIST), 8.5 CM WITH HEMORRHAGE AND SEVERAL MICROSCOPIC TUMOR FOCI ADJACENT TO THE TUMOR MASS.     I. PERICOLONIC MASS, EXCISION:            - METASTATIC GASTROINTESTINAL STROMAL TUMOR (GIST), 4.7 CM.     J. SUPRAPUBIC MASS, EXCISION:            - METASTATIC GASTROINTESTINAL STROMAL TUMOR (GIST), 7.8 CM WITH CYSTIC CHANGE.     K.  SUPRAPUBIC NODULE MASS, EXCISION:            - METASTATIC GASTROINTESTINAL STROMAL TUMOR (GIST), 5.5 CM WITH CYSTIC CHANGE.     L. INTRAABDOMINAL MASS, EXCISION:           -  METASTATIC GASTROINTESTINAL STROMAL TUMOR (GIST), 17 CM CM WITH TUMOR NECROSIS AND HEMORRHAGE.       M. SUPRAPUBIC MASS #2, EXCISION:           -  METASTATIC GASTROINTESTINAL STROMAL TUMOR (GIST), 7.5 CM AT THE END.      N. SUPRAPUBIC MASS #3, EXCISION:           -  METASTATIC GASTROINTESTINAL STROMAL TUMOR (GIST), 6.7 CM AT THE END.      O. SIGMOID NODULE, EXCISION:                 -  METASTATIC GASTROINTESTINAL STROMAL TUMOR (GIST), 2,9 CM WITH MARKED NECROSIS/HYALINIZATION OF TUMOR.      P. LEFT LOWER QUADRANT NODULE, EXCISION:            -  METASTATIC GASTROINTESTINAL STROMAL TUMOR (GIST), 11.3 CM WITH TUMOR NECROSIS AND HEMORRHAGE.      Q.   LEFT PERITONEAL NODULE, EXCISION:            -  METASTATIC GASTROINTESTINAL STROMAL TUMOR (GIST), 2.7 CM.       R.  ABDOMINAL WALL MASS, EXCISION:            -  METASTATIC GASTROINTESTINAL STROMAL TUMOR (GIST), 5.5 CM WITH CYSTIC CHANGE AND HEMORRHAGE.      S.  COLON MASS, EXCISION:            -  METASTATIC GASTROINTESTINAL STROMAL TUMOR (GIST), TWO FOCI, ONE MEASURE 0.5 CM  AND THE OTHER 0.1 CM.      T.   MESENTERIC MASS, EXCISION:             -  METASTATIC GASTROINTESTINAL STROMAL TUMOR (GIST), UP TO 9.7 CM WITH EXTENSIVE TUMOR NECROSIS, HEMORRHAGE AND FOCAL CYSTIC CHANGE.     U.   OMENTAL NODULE, EXCISION:             -  METASTATIC GASTROINTESTINAL STROMAL TUMOR (GIST), UP TO 5.0 CM WITH EXTENSIVE TUMOR NECROSIS, HEMORRHAGE AND FOCAL CYSTIC CHANGE.     V.  MESENTERIC NODULE, EXCISION:             - METASTATIC GASTROINTESTINAL STROMAL TUMOR (GIST), 0.4 CM.     W. RIGHT UPPER QUADRANT MASS, EXCISION:             -  METASTATIC GASTROINTESTINAL STROMAL TUMOR (GIST), UP TO 4.7 CM WITH MARKEDLY HEMORRHAGE AND CYSTIC CHANGE.      X.    RIGHT DIAPHRAGM MASS, EXCISION:             -  METASTATIC GASTROINTESTINAL STROMAL TUMOR (GIST), UP TO 3.5 CM WITH EXTENSIVE TUMOR NECROSIS, HEMORRHAGE AND FOCAL CYSTIC CHANGE.     Y. SMALL BOWEL. RESECTION:            -  SEGMENT OF SMALL INTESTINE WITH FOCAL ADHESION AND SEROSITIS, NO TUMOR SEEN.     Z. LEFT DIAPHRAGM, EXCISION:            -  METASTATIC GASTROINTESTINAL STROMAL TUMOR (GIST), UP TO 1 CM.     AA. SUBCUTANEOUS NODULE, EXCISION:              -  METASTATIC GASTROINTESTINAL STROMAL TUMOR (GIST), UP TO 3.7 CM WITH FOCAL CYSTIC CHANGE.         (RP:[facility])     NOTE:  Original GIST biopsy/resection was done at an outside hospital and the primary site is small intestine per clinical history. The morphological features and clinical presentation of this tumor are those of a malignant GIST.        Electronic Signature: Pierce Lerma MD, PhD               Assessment:     1. Gastrointestinal stromal tumor ([address]    2. Intractable chronic cluster headache    3. Abdominal mass, unspecified abdominal location    4. Pulmonary embolism without acute cor pulmonale, unspecified chronicity, unspecified pulmonary embolism type ([address]    5. Pulmonary embolism, other, unspecified chronicity, unspecified whether acute cor pulmonale present ([address]    6. Polycythemia        Plan:     Recurrent GIST  Abdominal wall mass  History of GIST 2010:  -- History of GIST, Dx 2010. S.p adjuvant Imatinib for about 1 year reportedly. -- 9/18/2020 CT reported extensive abdominal and pelvic mesenteric masses with some masses directly extending and penetrating through the anterior abdominal wall reflective of the given history of bulge.  There are some regions of benign herniation but the vast majority is malignant. Findings may be primary and/or metastatic and within the spectrum of carcinomatosis. Left adrenal nodule new since prior exam. Metastasis is not excluded. Stable right adrenal nodule. Stable hepatic hypodensities. -- 12/3/2020 Abdominal wall biopsy confirmed recurrent GIST.  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 Due to granular background staining, interpretation of PDL 1 immunohistochemical stain is not possible. -- He has seen Dr. Loc Geller for initial evaluation of resection. I have discussed with Dr. Yoni Escalante who suggested to start neoadjuvant TKIs prior to surgical resection. He was not interested in XRT at this point. 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 -- Since 2/16/2021 he was started Imatinib at 400 mg daily. He has tolerated therapy without high graded toxicities. He reported his tumors became smaller with therapy. -- 4/7/2021 CT C/A/P reported multiple abdominal and pelvic peritoneal masses mostly show mildly decrease in size and much decreased peripheral enhancing components since the prior CT, suggesting partial response to the treatment. No new lesion seen. -- Since his last visit on 5/26/2021, the patient did not keep follow up visit with our clinic. The patient reported he took [address] as 7 days on/3 days off (due to worsening headache) prior to his surgical resection. He stated she stopped Gleevec about 2 months before surgery. He was off Xarelto since started [address].   -- 4/5/2022 s/p exploratory laparotomy, small bowel resection, bladder repair, left ureterolysis, and excision of multiple abdominal and subcutaneous tissue tumors. + Post-operatively his paz was left in following his case since he underwent a bladder repair.  The patient had a severe lung disease/ hypoxia-related symptoms that improves with oxygen therapy.  + Pathology report as above, multiple tumor deposits. Plan:  -- Staging PET scan and MRI brain  -- Tumor profile [facility]  -- He will f/u Dr. Adilene Manning as planned  -- He will RTC 3 weeks for follows-up. Always sooner if required. Cluster headache/ Intractable headache  -- Advised to f/u Neurology for long term control. -- Brain MRI to r/o intracranial lesions        CNIV  -- Zofran PRN      Pulmonary embolus  -- He was diagnosed with one-time pulmonary embolus in May of 2016. Patient was being followed by Dr. Yi Lay who retired recently. He states that he is taking Xarelto 20mg PO daily since then. Thrombophilia w/u was negative reportedly. -- 10/12/2020 CTA reported no evidence of pulmonary embolism. -- Xarelto was d/c. Patient was advised to continue on ASA 81 mg for thromboprophylaxis. Polycythemia  -- He is an active smoker. He did not receive his phlebotomy for months for his polycythemia. Negative (wild type) for the JAK2 V617F.   -- His polycythemia was likely secondary 2/2 smoking, COPD. Recent CBC on 7/2020 showed improving H/H, 14/42. 4. I have counseled him on smoking cessation. -- Monitor CBC periodically      No orders of the defined types were placed in this encounter. All of patient's questions answered to their apparent satisfaction. They verbally show understanding and agreement with aforementioned plan. Rogelio Upton MD          Parts of this document has been produced using Dragon dictation system. Unrecognized errors in transcription may be present. Please do not hesitate to reach out for any questions or clarifications.       CC: RADHA Bhakta;

## 2022-06-09 ENCOUNTER — HOSPITAL ENCOUNTER (OUTPATIENT)
Age: 57
Discharge: HOME | End: 2022-06-09
Attending: INTERNAL MEDICINE
Payer: COMMERCIAL

## 2022-06-09 ENCOUNTER — HOSPITAL ENCOUNTER (OUTPATIENT)
Dept: PET IMAGING | Age: 57
Discharge: HOME | End: 2022-06-09
Attending: INTERNAL MEDICINE
Payer: COMMERCIAL

## 2022-06-09 PROCEDURE — A9575 INJ GADOTERATE MEGLUMI 0.1ML: HCPCS | Performed by: INTERNAL MEDICINE

## 2022-06-09 PROCEDURE — 74011000250 HC RX REV CODE- 250: Performed by: INTERNAL MEDICINE

## 2022-06-09 PROCEDURE — A9552 F18 FDG: HCPCS

## 2022-06-09 PROCEDURE — 70553 MRI BRAIN STEM W/O & W/DYE: CPT

## 2022-06-09 PROCEDURE — 74011250636 HC RX REV CODE- 250/636: Performed by: INTERNAL MEDICINE

## 2022-06-09 RX ORDER — BARIUM SULFATE 20 MG/ML
800 SUSPENSION ORAL
Status: COMPLETED | OUTPATIENT
Start: 2022-06-09 | End: 2022-06-09

## 2022-06-09 RX ORDER — FLUDEOXYGLUCOSE F-18 200 MCI/ML
5-10 INJECTION INTRAVENOUS ONCE
Status: COMPLETED | OUTPATIENT
Start: 2022-06-09 | End: 2022-06-09

## 2022-06-09 RX ADMIN — FLUDEOXYGLUCOSE F-18 8.42 MILLICURIE: 200 INJECTION INTRAVENOUS at 11:37

## 2022-06-09 RX ADMIN — BARIUM SULFATE 800 ML: 20 SUSPENSION ORAL at 12:32

## 2022-06-09 RX ADMIN — GADOTERATE MEGLUMINE 20 ML: 376.9 INJECTION INTRAVENOUS at 13:45

## 2022-06-15 ENCOUNTER — OFFICE VISIT (OUTPATIENT)
Dept: ONCOLOGY | Age: 57
End: 2022-06-15
Payer: COMMERCIAL

## 2022-06-15 ENCOUNTER — NURSE NAVIGATOR (OUTPATIENT)
Dept: OTHER | Age: 57
End: 2022-06-15

## 2022-06-15 VITALS
WEIGHT: 219 LBS | DIASTOLIC BLOOD PRESSURE: 79 MMHG | SYSTOLIC BLOOD PRESSURE: 121 MMHG | HEIGHT: 71 IN | RESPIRATION RATE: 16 BRPM | HEART RATE: 62 BPM | BODY MASS INDEX: 30.66 KG/M2 | OXYGEN SATURATION: 95 %

## 2022-06-15 DIAGNOSIS — D75.1 POLYCYTHEMIA: ICD-10-CM

## 2022-06-15 DIAGNOSIS — R11.2 CINV (CHEMOTHERAPY-INDUCED NAUSEA AND VOMITING): ICD-10-CM

## 2022-06-15 DIAGNOSIS — G44.021 INTRACTABLE CHRONIC CLUSTER HEADACHE: ICD-10-CM

## 2022-06-15 DIAGNOSIS — R19.00 ABDOMINAL MASS, UNSPECIFIED ABDOMINAL LOCATION: ICD-10-CM

## 2022-06-15 DIAGNOSIS — I26.99 PULMONARY EMBOLISM WITHOUT ACUTE COR PULMONALE, UNSPECIFIED CHRONICITY, UNSPECIFIED PULMONARY EMBOLISM TYPE (HCC): ICD-10-CM

## 2022-06-15 DIAGNOSIS — T45.1X5A CINV (CHEMOTHERAPY-INDUCED NAUSEA AND VOMITING): ICD-10-CM

## 2022-06-15 DIAGNOSIS — I26.99 PULMONARY EMBOLISM, OTHER, UNSPECIFIED CHRONICITY, UNSPECIFIED WHETHER ACUTE COR PULMONALE PRESENT (HCC): ICD-10-CM

## 2022-06-15 DIAGNOSIS — D75.1 ERYTHROCYTOSIS: ICD-10-CM

## 2022-06-15 DIAGNOSIS — C49.A0 GASTROINTESTINAL STROMAL TUMOR (HCC): ICD-10-CM

## 2022-06-15 DIAGNOSIS — C49.A0 GASTROINTESTINAL STROMAL TUMOR (HCC): Primary | ICD-10-CM

## 2022-06-15 PROCEDURE — 99214 OFFICE O/P EST MOD 30 MIN: CPT | Performed by: INTERNAL MEDICINE

## 2022-06-15 NOTE — NURSE NAVIGATOR
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 Pt will be changing therapy. Order for Sunitinib written. Chemo teaching scheduled for 6/22/22. Order for ECHO, urology referral-PET scan showed metabolic activity at anus.

## 2022-06-15 NOTE — PROGRESS NOTES
Hematology/Oncology  Progress Note    Name: Mary Jane Mark  Date: 6/15/2022  : 1965    RADHA Benitez     Mr. Carlos Zapata is a 60y.o. year old male who was seen for recurrent GIST. Subjective:     Mr. Carlos Zapata is a 57-year-old man who was diagnosed with a pulmonary embolus in May of 2016. Patient was being followed by Dr. Derek Reynolds who retired. He states that he is taking Xarelto 20mg PO daily. He admits smoking 1 ppd from 1 1/2ppd and states he is willing to cut back some more until he totally quits. He went to abdominal wall biopsy recently which confirmed recurrent GIST. He has hx of GIST in . He reported he was taking Gleevec for about one year after surgery at that time. He has started [facility] since 2021. He denied significant pain or vomiting or altered bowel movements. He is an active smoker. He did not receive his phlebotomy for months for his polycythemia. Today he has no other complaints. Denied fever, chills, night sweat, unintentional weight loss, skin lumps or bumps, acute bleeding or bruising issues. No acute bleeding, blood in stool, dark stool, melena, hematochezia, hemoptysis, dark urine, or easily bruising. Denied headache, acute vision change, dizziness, chest pain, worsen shortness of breath, palpitation, productive cough, vomiting, abdominal pain, altered bowel habits, dysuria, new bone pain or back pain, focal numbness or weakness. Past medical history, family history, and social history: these were reviewed and remains unchanged.     Past Medical History:   Diagnosis Date    CAD (coronary artery disease)     Carpal tunnel syndrome     COPD     Depression     H/O echocardiogram 2017    EF 35-40%, mild LVH, biatrial enlargement, mild TR, RVSP 41    Hemorrhoids     History of esophagitis     History of malignant gastrointestinal stromal tumor (GIST)     Hypercholesterolemia     Hypertension     Myocardial infarction [facility]     [facility]    Bilateral    S/P CABG x 3 2009    LIMA to LAD, SVG to RPDA, SVG to diagonal    S/P cardiac catheterization November 2011    Patent GALLAGHER to LAD, patent SVG to diet, and occluded SVG to RPDA,  native LAD 85% proximal stenosis, left circumflex 20% diffuse disease, RCA distal 70% stenosis,, EF 45%    Secondary polycythemia      Past Surgical History:   Procedure Laterality Date    HX CORONARY STENT PLACEMENT      HX CYST REMOVAL      HX GI      tumor near stomach    HX HEENT      deviated septum    HX HEMORRHOIDECTOMY      HX ORTHOPAEDIC      knee injury right    HX OTHER SURGICAL      resection of mesenteric mass    ME CARDIAC SURG PROCEDURE UNLIST      stent     Social History     Socioeconomic History    Marital status:      Spouse name: Not on file    Number of children: Not on file    Years of education: Not on file    Highest education level: Not on file   Occupational History    Not on file   Tobacco Use    Smoking status: Current Every Day Smoker     Packs/day: 1.50    Smokeless tobacco: Never Used   Substance and Sexual Activity    Alcohol use: Yes     Comment: \"I haven't drank in 2 months, but I was drinking about 12 pk beer per week\"     Drug use: No    Sexual activity: Not on file   Other Topics Concern    Not on file   Social History Narrative    Not on file     Social Determinants of Health     Financial Resource Strain:     Difficulty of Paying Living Expenses: Not on file   Food Insecurity:     Worried About Running Out of Food in the Last Year: Not on file    Ruben of Food in the Last Year: Not on file   Transportation Needs:     Lack of Transportation (Medical): Not on file    Lack of Transportation (Non-Medical):  Not on file   Physical Activity:     Days of Exercise per Week: Not on file    Minutes of Exercise per Session: Not on file   Stress:     Feeling of Stress : Not on file   Social Connections:     Frequency of Communication with Friends and Family: Not on file    Frequency of Social Gatherings with Friends and Family: Not on file    Attends Yazidi Services: Not on file    Active Member of Clubs or Organizations: Not on file    Attends Club or Organization Meetings: Not on file    Marital Status: Not on file   Intimate Partner Violence:     Fear of Current or Ex-Partner: Not on file    Emotionally Abused: Not on file    Physically Abused: Not on file    Sexually Abused: Not on file   Housing Stability:     Unable to Pay for Housing in the Last Year: Not on file    Number of [place] in the Last Year: Not on file    Unstable Housing in the Last Year: Not on file     History reviewed. No pertinent family history. Current Outpatient Medications   Medication Sig Dispense Refill    metoprolol tartrate (LOPRESSOR) 25 mg tablet take 1 tablet by mouth twice a day 180 Tablet 3    hydroCHLOROthiazide (HYDRODIURIL) 25 mg tablet take 1 tablet by mouth once daily 30 Tablet 6    ramipriL (ALTACE) 10 mg capsule take 1 capsule by mouth once daily 90 Capsule 3    aspirin (ASPIRIN) 325 mg tablet Take 325 mg by mouth daily.  traZODone (DESYREL) 150 mg tablet Take 150 mg by mouth nightly. Review of Systems   Constitutional: Positive for malaise/fatigue. Negative for chills, diaphoresis, fever and weight loss. Respiratory: Negative for cough, hemoptysis, shortness of breath and wheezing. Cardiovascular: Negative for chest pain, palpitations and leg swelling. Gastrointestinal: Negative for abdominal pain, diarrhea, heartburn, nausea and vomiting. Genitourinary: Negative for dysuria, frequency, hematuria and urgency. Musculoskeletal: Negative for joint pain and myalgias. Skin: Negative for itching and rash. Neurological: Negative for dizziness, seizures, weakness and headaches. Psychiatric/Behavioral: Negative for depression. The patient does not have insomnia.              Objective:     Visit Vitals  BP 121/79   Pulse 62   Resp 16   Ht 5' 11\" (1.803 m)   Wt 99.3 kg (219 lb)   SpO2 95%   BMI 30.54 kg/m²       ECOG Performance Status (grade): 0  0 - able to carry on all pre-disease activity w/out restriction  1 - restricted but able to carry out light work  2 - ambulatory and can self- care but unable to carry out work  3 - bed or chair >50% of waking hours  4 - completely disable, total care, confined to bed or chair    Physical Exam  Constitutional:       General: He is not in acute distress. HENT:      Head: Normocephalic and atraumatic. Eyes:      Pupils: Pupils are equal, round, and reactive to light. Cardiovascular:      Pulses: Normal pulses. Heart sounds: Normal heart sounds. No murmur heard. Pulmonary:      Effort: Pulmonary effort is normal. No respiratory distress. Breath sounds: Normal breath sounds. Abdominal:      General: Bowel sounds are normal. There is no distension. Palpations: Abdomen is soft. There is no mass. Tenderness: There is no abdominal tenderness. There is no guarding. Musculoskeletal:         General: No swelling or tenderness. Cervical back: Neck supple. No rigidity. Lymphadenopathy:      Cervical: No cervical adenopathy. Skin:     General: Skin is warm. Findings: No rash. Neurological:      Mental Status: He is alert and oriented to person, place, and time. Mental status is at baseline. Cranial Nerves: No cranial nerve deficit. Psychiatric:         Mood and Affect: Mood normal.          Diagnostics:      No results found for this or any previous visit (from the past 96 hour(s)). Imaging:  No results found for this or any previous visit. Results for orders placed during the hospital encounter of 05/31/16    XR SWALLOW FUNC VIDEO    Narrative  Modified barium swallow with video recording:        INDICATION:    Dysphagia.         The study has been performed under supervision of speech therapist.    The patient swallowed thin liquid but there is been deep penetration almost to  the level of vocal cord. Then patient swallowed thin liquid food with small sips  and with chin-tucked position. At  this time, there has been no penetration or  aspiration of thin liquid into larynx. Then patient swallowed barium tablet. There has  been penetration of thin liquid used for swallowing tablet into larynx. There  is been no evidence of stagnation of tablet in hypopharynx or in upper  esophagus. Patient swallowed puree and cracker normally without penetration or  nasopharyngeal into larynx. Fluoroscopy time utilized is 48 seconds. No fluoroscopy images is obtained. Impression  : At first there is penetration of thin liquid into larynx as described. But when  the patient swallowed thin liquid in small sips with chin tuck position, there  is been no penetration or aspiration into larynx. Patient swallowed barium tablet but there has  been penetration of the liquid  used with a tablet. Swallowing of puree and cracker has been normal.      Results for orders placed during the hospital encounter of 01/10/22    CT ABD PELV W CONT    Narrative  EXAM:  CT Abdomen-Pelvis with Contrast.    CLINICAL INDICATION:  Gastrointestinal stromal tumor (C49.A0). COMPARISON:  04/07/21, 09/18/20. TECHNIQUE:    - Helical volumetric CT imaging of the abdomen and pelvis is performed following  IV contrast administration. Coronal and sagittal multiplanar reconstruction  images are generated for improved anatomic delineation.  - IV contrast 100 mL Isovue-300. - All CT scans at this facility are performed using dose optimization technique  as appropriate to the performed exam, to include automated exposure control,  adjustment of the mA and/or kV according to patient's size (Including  appropriate matching for site-specific examinations), or use of iterative  reconstruction technique.     FINDINGS:    Lung Bases:  Slightly heterogeneous lung attenuation pattern which may be  secondary to air trapping vs. emphysematous changes. Similar pattern compared  to prior studies. No airspace opacities. Liver: There are multiple tiny hepatic hypodensities, overall similar to prior  studies. The largest of the hepatic hypodensities is identified in the left  lobe adjacent to the umbilical fissure measuring about 1.0 x 0.7 cm (axial image  18). Gallbladder, Spleen, Pancreas, Kidneys:  Unremarkable. Adrenal Glands:  Stable 1.7 x 1.4 cm right adrenal gland nodule. Stable 1.7 x  1.3 cm and 1.4 x 1.3 cm left adrenal gland nodules. GI Tract, Abdominal Wall, Peritoneal Cavity:    - Extensive intra-abdominal and extra-abdominal masses. The largest of the  intra-abdominal mass is identified at about the level of the umbilicus measuring  about 12.0 x 6.9 cm (axial #53). This mass is decreased compared to 04/07/21,  previously measuring 218.0 x 10.0 cm. The left paramedian iliac fossa region  mass currently measures about 9.6 x 8.1 cm vs. 10.0 x 9.1 cm on 04/07/21. Some  of the masses to demonstrate distinct interval increase in size, i.e. upper  abdomen midline region mass as into the abdominal wall musculature currently  measures about 7.2 x 4.8 cm (axial #26) vs. 6.2 x 4.3 cm on 04/07/21. Subcutaneous mass in the right upper quadrant paramedian region measures about  5.7 x 3.4 cm, increased from about 5.0 x 2.9 cm on 04/07/21. This mass may be  the most readily amenable target to site for biopsy if tissue sampling is  needed. The overall total tumor burden does appear decreased compared to  04/07/21. - Unremarkable stomach and small bowel. Normal appendix. No acute colitis or  diverticulitis is detected along the colon. Nodes:  No para-aortic or pericaval adenopathy. Vascular:  No acute abnormalities. Bladder:  Underdistended. Slight bladder wall thickening. Grossly unremarkable  prostate. Bones:  Unremarkable. Impression  1. Extensive intraabdominal and abdominal wall masses. Some of the lesions are  decreased in extent whereas some are increased in size. 2.  Stable multiple tiny hepatic hypodensities, favor hepatic cysts. 3.  Stable adrenal gland nodules. Favor benign process based on stability. PATHOLOGY  12/3/2020 ANTERIOR ABDOMINAL WALL MASS, BIOPSY:   GASTROINTESTINAL STROMAL TUMOR. GASTROINTESTINAL STROMAL TUMOR (GIST): Biopsy   SPECIMEN   Procedure: Core needle biopsy   TUMOR   Tumor Site: Anterior abdominal wall   Histologic Type: Gastrointestinal stromal tumor, spindle cell type   Histologic Grade: G1: Low grade; mitotic rate <= 5 / 5 mm2   Mitotic Rate: 1 mitoses per 5 mm2   Necrosis: Not identified   Risk Assessment: Cannot be determined: Recurrent   Treatment Effect: No known prebiopsy therapy     DIAGNOSIS COMMENT:   Given the patient's history of gastrointestinal stromal tumor, the findings are consistent with recurrent disease.     4/5/2022  Surgical Pathology Report                         Case: [id number]                                   Authorizing Provider: Fide Vaughan MD      Collected:           04/05/2022 0924               Ordering Location:     [facility]    Received:            04/06/2022 [facility]                                      [facility]                                                     Pathologist:           Brigitte Riley MD                                                             Specimens:   A) - Epigastrium, Subcutaneous epigastric tumor in 10% Formalin                                      B) - Abdomen, Subcutaneous mass mid-abdomen in 10% Formalin                                          C) - Abdomen, Subcutaneous mass mid-abdomen #2 in 10% Formalin                                      D) - Abdomen, Intra abdominal mass mid-abdomen in 10% Formalin                                      E) - Abdomen, Intra abdominal mass mid-abdomen #2 in 10% Formalin                                    F) - Abdomen, Intra abdominal mass mid-abdomen #3 in 10% Formalin                                    G) - Abdomen, Intra abdominal mass mid-abdomen #4 in 10% Formalin                                    H) - Abdomen, Intra abdominal mass mid-abdomen #5 in 10% Formalin                                    I) - Tissue, Pericolonic mass in 10% Formalin                                                        J) - Suprapubic, suprapubic mass in 10% formalin                                                    K) - Suprapubic, suprapubic nodule mass in 10% formalin                                              L) - Abdomen, intra abdominal mass in 10% formalin                                                  M) - Suprapubic, suprapubic mass in #2 in 10% formalin                                              N) - Suprapubic, suprapubic mass #3 in 10% formalin                                                  O) - Sigmoid, Sigmoid nodule in 10% formalin                                                        P) - Nodule, left lower quadrant nodule in 10% formalin.                                            Q) - Tissue, left peritoneal nodule in 10% formalin.                                                R) - Abdominal Wall, abdominal wall mass in 10% formalin.                                            S) - Colon, colon mass in 10% formalin.                                                              T) - Mesentery, mesenteric mass in 10% formalin.                                                    U) - Omentum, omental nodule in 10% formalin.                                                        V) - Mesentery, mesenteric nodule in 10% formalin.                                                  W) - Tissue, right upper quadrant mass in 10% formalin.                                              X) - Diaphragm, Right diaphragm mass in 10% formalin.                                                Y) - Small Bowel, Small bowel                                                                        Z) - Diaphragm, Left Diaphragm                                                                      AA) - Nodule, subcutaneous nodule in 10% formalin.                                       Diagnosis     A.  SUBCUTANEOUS EPIGASTRIC TUMOR, EXCISION:          -  METASTATIC GASTROINTESTINAL STROMAL TUMOR (GIST), 2.9 CM.     B.  SUBCUTANEOUS MASS, MID ABDOMEN, EXCISION:          -  METASTATIC GASTROINTESTINAL STROMAL TUMOR (GIST), 5.9 CM WITH HEMORRHAGE AND THE TUMOR NECROSIS.     C.  SUBCUTANEOUS MASS, MID ABDOMEN #2, EXCISION:          -  METASTATIC GASTROINTESTINAL STROMAL TUMOR (GIST), 7.5 CM.     D. INTRAABDOMINAL MASS, MID ABDOMEN, EXCISION:          -  METASTATIC GASTROINTESTINAL STROMAL TUMOR (GIST), 9.2 CM WITH FOCAL HEMORRHAGE AND NECROSIS.     E.  INTRA-ABDOMINAL MASS, MID ABDOMEN, #2, EXCISION:          -  METASTATIC GASTROINTESTINAL STROMAL TUMOR (GIST), 3.7 CM WITH HEMORRHAGE.     F. INTRA-ABDOMINAL MASS, MID ABDOMEN #3, EXCISION:           -  METASTATIC GASTROINTESTINAL STROMAL TUMOR (GIST), 4.3 CM WITH TUMOR NECROSIS AND HEMORRHAGE.     G. INTRA-ABDOMINAL MASS, MID ABDOMEN #4, EXCISION:           -  FOCI OF METASTATIC GASTROINTESTINAL STROMAL TUMOR (GIST) , 1.7 AND 7.5 CM, FOCALLY HEMORRHAGIC AND NECROTIC.     H. INTRA-ABDOMINAL MASS, MID ABDOMEN #5, EXCISION:            -  METASTATIC GASTROINTESTINAL STROMAL TUMOR (GIST), 8.5 CM WITH HEMORRHAGE AND SEVERAL MICROSCOPIC TUMOR FOCI ADJACENT TO THE TUMOR MASS.     I. PERICOLONIC MASS, EXCISION:            - METASTATIC GASTROINTESTINAL STROMAL TUMOR (GIST), 4.7 CM.     J. SUPRAPUBIC MASS, EXCISION:            - METASTATIC GASTROINTESTINAL STROMAL TUMOR (GIST), 7.8 CM WITH CYSTIC CHANGE.     K.  SUPRAPUBIC NODULE MASS, EXCISION:            - METASTATIC GASTROINTESTINAL STROMAL TUMOR (GIST), 5.5 CM WITH CYSTIC CHANGE.     L.   INTRAABDOMINAL MASS, EXCISION: -  METASTATIC GASTROINTESTINAL STROMAL TUMOR (GIST), 17 CM CM WITH TUMOR NECROSIS AND HEMORRHAGE.       M. SUPRAPUBIC MASS #2, EXCISION:           -  METASTATIC GASTROINTESTINAL STROMAL TUMOR (GIST), 7.5 CM AT THE END.      N. SUPRAPUBIC MASS #3, EXCISION:           -  METASTATIC GASTROINTESTINAL STROMAL TUMOR (GIST), 6.7 CM AT THE END.      O. SIGMOID NODULE, EXCISION:                 -  METASTATIC GASTROINTESTINAL STROMAL TUMOR (GIST), 2,9 CM WITH MARKED NECROSIS/HYALINIZATION OF TUMOR.      P. LEFT LOWER QUADRANT NODULE, EXCISION:            -  METASTATIC GASTROINTESTINAL STROMAL TUMOR (GIST), 11.3 CM WITH TUMOR NECROSIS AND HEMORRHAGE.      Q. LEFT PERITONEAL NODULE, EXCISION:            -  METASTATIC GASTROINTESTINAL STROMAL TUMOR (GIST), 2.7 CM.       R.  ABDOMINAL WALL MASS, EXCISION:            -  METASTATIC GASTROINTESTINAL STROMAL TUMOR (GIST), 5.5 CM WITH CYSTIC CHANGE AND HEMORRHAGE.      S.  COLON MASS, EXCISION:            -  METASTATIC GASTROINTESTINAL STROMAL TUMOR (GIST), TWO FOCI, ONE MEASURE 0.5 CM  AND THE OTHER 0.1 CM.      T.   MESENTERIC MASS, EXCISION:             -  METASTATIC GASTROINTESTINAL STROMAL TUMOR (GIST), UP TO 9.7 CM WITH EXTENSIVE TUMOR NECROSIS, HEMORRHAGE AND FOCAL CYSTIC CHANGE.     U.   OMENTAL NODULE, EXCISION:             -  METASTATIC GASTROINTESTINAL STROMAL TUMOR (GIST), UP TO 5.0 CM WITH EXTENSIVE TUMOR NECROSIS, HEMORRHAGE AND FOCAL CYSTIC CHANGE.     V.  MESENTERIC NODULE, EXCISION:             -  METASTATIC GASTROINTESTINAL STROMAL TUMOR (GIST), 0.4 CM.     W. RIGHT UPPER QUADRANT MASS, EXCISION:             -  METASTATIC GASTROINTESTINAL STROMAL TUMOR (GIST), UP TO 4.7 CM WITH MARKEDLY HEMORRHAGE AND CYSTIC CHANGE.      X.    RIGHT DIAPHRAGM MASS, EXCISION:             -  METASTATIC GASTROINTESTINAL STROMAL TUMOR (GIST), UP TO 3.5 CM WITH EXTENSIVE TUMOR NECROSIS, HEMORRHAGE AND FOCAL CYSTIC CHANGE.     Y. SMALL BOWEL.  RESECTION:            -  SEGMENT OF SMALL INTESTINE WITH FOCAL ADHESION AND SEROSITIS, NO TUMOR SEEN.     Z. LEFT DIAPHRAGM, EXCISION:            -  METASTATIC GASTROINTESTINAL STROMAL TUMOR (GIST), UP TO 1 CM.     AA. SUBCUTANEOUS NODULE, EXCISION:              -  METASTATIC GASTROINTESTINAL STROMAL TUMOR (GIST), UP TO 3.7 CM WITH FOCAL CYSTIC CHANGE.         (RP:aoc)     NOTE:  Original GIST biopsy/resection was done at an outside hospital and the primary site is small intestine per clinical history. The morphological features and clinical presentation of this tumor are those of a malignant GIST.        Electronic Signature: Fariha Birmingham MD, PhD               Assessment:     1. Gastrointestinal stromal tumor ([address]    2. Intractable chronic cluster headache    3. Abdominal mass, unspecified abdominal location    4. Pulmonary embolism without acute cor pulmonale, unspecified chronicity, unspecified pulmonary embolism type ([address]    5. Pulmonary embolism, other, unspecified chronicity, unspecified whether acute cor pulmonale present ([address]    6. Polycythemia    7. Erythrocytosis    8. CINV (chemotherapy-induced nausea and vomiting)        Plan:     Recurrent GIST  Abdominal wall mass  History of GIST 2010:  -- History of GIST, Dx 2010. S.p adjuvant Imatinib for about 1 year reportedly. -- 9/18/2020 CT reported extensive abdominal and pelvic mesenteric masses with some masses directly extending and penetrating through the anterior abdominal wall reflective of the given history of bulge. There are some regions of benign herniation but the vast majority is malignant. Findings may be primary and/or metastatic and within the spectrum of carcinomatosis. Left adrenal nodule new since prior exam. Metastasis is not excluded. Stable right adrenal nodule. Stable hepatic hypodensities.   -- 12/3/2020 Abdominal wall biopsy confirmed recurrent GIST.  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 Due to granular background staining, interpretation of PDL 1 immunohistochemical stain is not possible. -- He has seen Dr. Orlin Campuzano for initial evaluation of resection. I have discussed with Dr. Irasema Arroyo who suggested to start neoadjuvant TKIs prior to surgical resection. He was not interested in XRT at this point. 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 -- Since 2/16/2021 he was started Imatinib at 400 mg daily. He has tolerated therapy without high graded toxicities. He reported his tumors became smaller with therapy. -- 4/7/2021 CT C/A/P reported multiple abdominal and pelvic peritoneal masses mostly show mildly decrease in size and much decreased peripheral enhancing components since the prior CT, suggesting partial response to the treatment. No new lesion seen. -- Since his last visit on 5/26/2021, the patient did not keep follow up visit with our clinic. The patient reported he took [address] as 7 days on/3 days off (due to worsening headache) prior to his surgical resection. He stated she stopped Gleevec about 2 months before surgery. He was off Xarelto since started [address].   -- 4/5/2022 s/p exploratory laparotomy, small bowel resection, bladder repair, left ureterolysis, and excision of multiple abdominal and subcutaneous tissue tumors. + Post-operatively his paz was left in following his case since he underwent a bladder repair. The patient had a severe lung disease/ hypoxia-related symptoms that improves with oxygen therapy.  + Pathology report as above, multiple tumor deposits.    -- 6/9/2022 Brain MRI reported no finding for metastatic disease.  -- 6/9/2022 Restaging PET reported residual hypermetabolic metastatic deposits to include the anterior left pelvis omentum and left abdominal wall.  + Hypermetabolic soft tissue left retroperitoneum between the psoas muscle and  common iliac chain. May represent residual metastatic tumor deposit however the ureter is in this area and could be a potential cause for false positive.  + There is soft tissue thickening with less intense but moderately increased  metabolic activity in the midline at site of surgical incision and metastatic  mass on prior CT, interval surgically excised. Residual malignant tumor in this  area versus postsurgical change in the differential.  + There is very intense metabolic activity at the anus. This could be urologic  however correlation with physical examination suggested to help exclude  possibility of underlying tumor or inflammatory process in this area.  + No finding for FDG avid metastatic disease in the chest.  -- Today I have reviewed with the patient about recent PET and MRI reports. I have discussed with the patient about systemic therapy options. In patients  who are intolerant of imatinib or who become refractory to treatment, we recommend the multitargeted TKI sunitinib. The potential side effects explained, printed information provided. The patient was agreeable,e with the plan. He states he will f/u Dr. Elfego Terry for a very intense metabolic activity at the anus found on recent PET. Denied any symptomatic issues. Plan:  -- Sunitinib 37.5 mg once daily, continuous daily dosing until disease progression or unacceptable toxicity [id number]). -- Labs baseline TSH/FT4, LFTs, Chemistry. -- Baseline Echocardiogram  -- F/u PCP to optimize HTN control  -- F/u Tumor profile [facility] report  -- He will f/u Dr. Elfego Terry   -- He will RTC 1 week for pre-chemo assessment. Always sooner if required. Cluster headache/ Intractable headache  -- Advised to f/u Neurology for long term control. -- Brain MRI as above      CNIV  -- Zofran PRN      Pulmonary embolus  -- He was diagnosed with one-time pulmonary embolus in May of 2016.  Patient was being followed by Dr. Prashant De Jesus who retired recently. He states that he is taking Xarelto 20mg PO daily since then. Thrombophilia w/u was negative reportedly. -- 10/12/2020 CTA reported no evidence of pulmonary embolism. -- Xarelto was d/c. Patient was advised to continue on ASA 81 mg for thromboprophylaxis. Polycythemia  -- He is an active smoker. He did not receive his phlebotomy for months for his polycythemia. Negative (wild type) for the JAK2 [id number].   -- His polycythemia was likely secondary 2/2 smoking, COPD. Recent CBC on 7/2020 showed improving H/H, 14/42. 4. I have counseled him on smoking cessation. -- Monitor CBC periodically      Orders Placed This Encounter    METABOLIC PANEL, COMPREHENSIVE     Standing Status:   Future     Number of Occurrences:   1     Standing Expiration Date:   6/16/2023    CBC WITH AUTOMATED DIFF     Standing Status:   Future     Number of Occurrences:   1     Standing Expiration Date:   6/15/2023    TSH+FREE T4     Standing Status:   Future     Number of Occurrences:   1     Standing Expiration Date:   6/16/2023    REFERRAL TO UROLOGY     Referral Priority:   Urgent     Referral Type:   Consultation     Referral Reason:   Specialty Services Required     Referred to Provider:   Kulwinder Phelan MD     Requested Specialty:   Urology     Number of Visits Requested:   1    SUNItinib 37.5 mg cap     Sig: Take 1 Capsule by mouth daily for 28 days. Dispense:  28 Capsule     Refill:  4              All of patient's questions answered to their apparent satisfaction. They verbally show understanding and agreement with aforementioned plan. Nicky Stockton MD          Parts of this document has been produced using Dragon dictation system. Unrecognized errors in transcription may be present. Please do not hesitate to reach out for any questions or clarifications.       CC: RADHA Ayoub;

## 2022-06-16 ENCOUNTER — NURSE NAVIGATOR (OUTPATIENT)
Dept: OTHER | Age: 57
End: 2022-06-16

## 2022-06-16 LAB
A-G RATIO,AGRAT: 1.6 RATIO (ref 1.1–2.6)
ABSOLUTE LYMPHOCYTE COUNT, 10803: 2 K/UL (ref 1–4.8)
ALBUMIN SERPL-MCNC: 4.1 G/DL (ref 3.5–5)
ALP SERPL-CCNC: 53 U/L (ref 25–115)
ALT SERPL-CCNC: 6 U/L (ref 5–40)
ANION GAP SERPL CALC-SCNC: 11 MMOL/L (ref 3–15)
AST SERPL W P-5'-P-CCNC: 12 U/L (ref 10–37)
BASOPHILS # BLD: 0.1 K/UL (ref 0–0.2)
BASOPHILS NFR BLD: 1 % (ref 0–2)
BILIRUB SERPL-MCNC: 0.2 MG/DL (ref 0.2–1.2)
BUN SERPL-MCNC: 11 MG/DL (ref 6–22)
CALCIUM SERPL-MCNC: 10 MG/DL (ref 8.4–10.5)
CHLORIDE SERPL-SCNC: 99 MMOL/L (ref 98–110)
CO2 SERPL-SCNC: 27 MMOL/L (ref 20–32)
CREAT SERPL-MCNC: 0.9 MG/DL (ref 0.5–1.2)
EOSINOPHIL # BLD: 1.1 K/UL (ref 0–0.5)
EOSINOPHIL NFR BLD: 12 % (ref 0–6)
ERYTHROCYTE [DISTWIDTH] IN BLOOD BY AUTOMATED COUNT: 14.9 % (ref 10–15.5)
GLOBULIN,GLOB: 2.6 G/DL (ref 2–4)
GLOMERULAR FILTRATION RATE: >60 ML/MIN/1.73 SQ.M.
GLUCOSE SERPL-MCNC: 105 MG/DL (ref 70–99)
GRANULOCYTES,GRANS: 56 % (ref 40–75)
HCT VFR BLD AUTO: 47.7 % (ref 39.3–51.6)
HGB BLD-MCNC: 14.6 G/DL (ref 13.1–17.2)
LYMPHOCYTES, LYMLT: 22 % (ref 20–45)
MCH RBC QN AUTO: 30 PG (ref 26–34)
MCHC RBC AUTO-ENTMCNC: 31 G/DL (ref 31–36)
MCV RBC AUTO: 97 FL (ref 80–95)
MONOCYTES # BLD: 0.8 K/UL (ref 0.1–1)
MONOCYTES NFR BLD: 9 % (ref 3–12)
NEUTROPHILS # BLD AUTO: 5.1 K/UL (ref 1.8–7.7)
PLATELET # BLD AUTO: 292 K/UL (ref 140–440)
PMV BLD AUTO: 11.1 FL (ref 9–13)
POTASSIUM SERPL-SCNC: 4.6 MMOL/L (ref 3.5–5.5)
PROT SERPL-MCNC: 6.7 G/DL (ref 6.4–8.3)
RBC # BLD AUTO: 4.92 M/UL (ref 3.8–5.8)
SODIUM SERPL-SCNC: 137 MMOL/L (ref 133–145)
T4 FREE SERPL-MCNC: 1.2 NG/DL (ref 0.9–1.8)
TSH SERPL DL<=0.005 MIU/L-ACNC: 1.45 MCU/ML (ref 0.27–4.2)
WBC # BLD AUTO: 9.1 K/UL (ref 4–11)

## 2022-06-16 NOTE — NURSE NAVIGATOR
Spoke with pt in reference to scheduling echo, he said that he wanted his cardiologist to do the echo. He will call their office to make an appointment.

## 2022-06-17 ENCOUNTER — TELEPHONE (OUTPATIENT)
Dept: ONCOLOGY | Age: 57
End: 2022-06-17

## 2022-06-17 RX ORDER — HYDROCHLOROTHIAZIDE 25 MG/1
TABLET ORAL
Qty: 30 TABLET | Refills: 6 | Status: SHIPPED | OUTPATIENT
Start: 2022-06-17

## 2022-06-17 RX ORDER — SUNITINIB MALATE 37.5 MG/1
1 CAPSULE ORAL DAILY
Qty: 28 CAPSULE | Refills: 4 | Status: SHIPPED | OUTPATIENT
Start: 2022-06-17 | End: 2022-06-22 | Stop reason: ALTCHOICE

## 2022-06-17 NOTE — TELEPHONE ENCOUNTER
Patient called office, stated he did not want to start the new medication Sutent. He would like to go back on his old medication Gleevec. 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    He is asking to be called to discuss

## 2022-06-20 ENCOUNTER — NURSE NAVIGATOR (OUTPATIENT)
Dept: OTHER | Age: 57
End: 2022-06-20

## 2022-06-20 RX ORDER — RAMIPRIL 10 MG/1
CAPSULE ORAL
Qty: 90 CAPSULE | Refills: 3 | Status: SHIPPED | OUTPATIENT
Start: 2022-06-20

## 2022-06-20 NOTE — TELEPHONE ENCOUNTER
[facility] called the office to report his medication of Sutent has been placed on hold. If patient would like to start medication we need to contact them to resume.

## 2022-06-20 NOTE — NURSE NAVIGATOR
Received voicemail message from pt stating that he did not want to take Sunitinib and he wanted imaging faxed to Dr. Leslie Billings.

## 2022-06-22 ENCOUNTER — OFFICE VISIT (OUTPATIENT)
Dept: ONCOLOGY | Age: 57
End: 2022-06-22

## 2022-06-22 VITALS
SYSTOLIC BLOOD PRESSURE: 133 MMHG | HEART RATE: 67 BPM | WEIGHT: 219 LBS | DIASTOLIC BLOOD PRESSURE: 80 MMHG | BODY MASS INDEX: 30.66 KG/M2 | RESPIRATION RATE: 18 BRPM | OXYGEN SATURATION: 96 % | HEIGHT: 71 IN

## 2022-06-22 DIAGNOSIS — G44.021 INTRACTABLE CHRONIC CLUSTER HEADACHE: ICD-10-CM

## 2022-06-22 DIAGNOSIS — D75.1 POLYCYTHEMIA: ICD-10-CM

## 2022-06-22 DIAGNOSIS — Z51.11 ENCOUNTER FOR ANTINEOPLASTIC CHEMOTHERAPY: ICD-10-CM

## 2022-06-22 DIAGNOSIS — R19.00 ABDOMINAL MASS, UNSPECIFIED ABDOMINAL LOCATION: ICD-10-CM

## 2022-06-22 DIAGNOSIS — C49.A0 GASTROINTESTINAL STROMAL TUMOR (HCC): Primary | ICD-10-CM

## 2022-06-22 PROCEDURE — 99215 OFFICE O/P EST HI 40 MIN: CPT | Performed by: INTERNAL MEDICINE

## 2022-06-22 RX ORDER — IMATINIB MESYLATE 400 MG/1
TABLET, FILM COATED ORAL
Qty: 30 TABLET | Refills: 3 | Status: SHIPPED | OUTPATIENT
Start: 2022-06-22 | End: 2022-10-27 | Stop reason: SDUPTHER

## 2022-06-22 RX ORDER — PREDNISONE 10 MG/1
10 TABLET ORAL
Qty: 7 TABLET | Refills: 0 | Status: SHIPPED | OUTPATIENT
Start: 2022-06-22 | End: 2022-06-29

## 2022-06-26 NOTE — PROGRESS NOTES
Hematology/Oncology  Progress Note    Name: Gerard Juna  Date: 2022  : 1965    RADHA Jacome     Mr. Eliel Valle is a 59y.o. year old male who was seen for recurrent GIST. Subjective:     Mr. Eliel Valle is a 49-year-old man who was diagnosed with a pulmonary embolus in May of 2016. Patient was being followed by Dr. Hoda Cox who retired. He states that he is taking Xarelto 20mg PO daily. He admits smoking 1 ppd from 1 /2ppd and states he is willing to cut back some more until he totally quits. He went to abdominal wall biopsy recently which confirmed recurrent GIST. He has hx of GIST in . He reported he was taking Gleevec for about one year after surgery at that time. He has started [facility] since 2021. He denied significant pain or vomiting or altered bowel movements. He is an active smoker. He did not receive his phlebotomy for months for his polycythemia. Today he has no other complaints. Denied fever, chills, night sweat, unintentional weight loss, skin lumps or bumps, acute bleeding or bruising issues. No acute bleeding, blood in stool, dark stool, melena, hematochezia, hemoptysis, dark urine, or easily bruising. Denied headache, acute vision change, dizziness, chest pain, worsen shortness of breath, palpitation, productive cough, vomiting, abdominal pain, altered bowel habits, dysuria, new bone pain or back pain, focal numbness or weakness. Past medical history, family history, and social history: these were reviewed and remains unchanged.     Past Medical History:   Diagnosis Date    CAD (coronary artery disease)     Carpal tunnel syndrome     COPD     Depression     H/O echocardiogram 2017    EF 35-40%, mild LVH, biatrial enlargement, mild TR, RVSP 41    Hemorrhoids     History of esophagitis     History of malignant gastrointestinal stromal tumor (GIST)     Hypercholesterolemia     Hypertension     Myocardial infarction [facility])     [facility]    Bilateral    S/P CABG x 3 2009    LIMA to LAD, SVG to RPDA, SVG to diagonal    S/P cardiac catheterization November 2011    Patent GALLAGHER to LAD, patent SVG to diet, and occluded SVG to RPDA,  native LAD 85% proximal stenosis, left circumflex 20% diffuse disease, RCA distal 70% stenosis,, EF 45%    Secondary polycythemia      Past Surgical History:   Procedure Laterality Date    HX CORONARY STENT PLACEMENT      HX CYST REMOVAL      HX GI      tumor near stomach    HX HEENT      deviated septum    HX HEMORRHOIDECTOMY      HX ORTHOPAEDIC      knee injury right    HX OTHER SURGICAL      resection of mesenteric mass    [facility] CARDIAC SURG PROCEDURE UNLIST      stent     Social History     Socioeconomic History    Marital status:      Spouse name: Not on file    Number of children: Not on file    Years of education: Not on file    Highest education level: Not on file   Occupational History    Not on file   Tobacco Use    Smoking status: Current Every Day Smoker     Packs/day: 1.50    Smokeless tobacco: Never Used   Substance and Sexual Activity    Alcohol use: Yes     Comment: \"I haven't drank in 2 months, but I was drinking about 12 pk beer per week\"     Drug use: No    Sexual activity: Not on file   Other Topics Concern    Not on file   Social History Narrative    Not on file     Social Determinants of Health     Financial Resource Strain:     Difficulty of Paying Living Expenses: Not on file   Food Insecurity:     Worried About Running Out of Food in the Last Year: Not on file    Ruben of Food in the Last Year: Not on file   Transportation Needs:     Lack of Transportation (Medical): Not on file    Lack of Transportation (Non-Medical):  Not on file   Physical Activity:     Days of Exercise per Week: Not on file    Minutes of Exercise per Session: Not on file   Stress:     Feeling of Stress : Not on file   Social Connections:     Frequency of Communication with Friends and Family: Not on file    Frequency of Social Gatherings with Friends and Family: Not on file    Attends Sabianist Services: Not on file    Active Member of Clubs or Organizations: Not on file    Attends Club or Organization Meetings: Not on file    Marital Status: Not on file   Intimate Partner Violence:     Fear of Current or Ex-Partner: Not on file    Emotionally Abused: Not on file    Physically Abused: Not on file    Sexually Abused: Not on file   Housing Stability:     Unable to Pay for Housing in the Last Year: Not on file    Number of [place] in the Last Year: Not on file    Unstable Housing in the Last Year: Not on file     History reviewed. No pertinent family history. Current Outpatient Medications   Medication Sig Dispense Refill    imatinib (Gleevec) 400 mg tablet Take one tab by mouth daily with a large glass of water 30 Tablet 3    predniSONE (DELTASONE) 10 mg tablet Take 10 mg by mouth daily (with breakfast) for 7 days. 7 Tablet 0    ramipriL (ALTACE) 10 mg capsule take 1 capsule by mouth once daily 90 Capsule 3    hydroCHLOROthiazide (HYDRODIURIL) 25 mg tablet take 1 tablet by mouth once daily 30 Tablet 6    metoprolol tartrate (LOPRESSOR) 25 mg tablet take 1 tablet by mouth twice a day 180 Tablet 3    aspirin (ASPIRIN) 325 mg tablet Take 325 mg by mouth daily.  traZODone (DESYREL) 150 mg tablet Take 150 mg by mouth nightly. Review of Systems   Constitutional: Positive for malaise/fatigue. Negative for chills, diaphoresis, fever and weight loss. Respiratory: Negative for cough, hemoptysis, shortness of breath and wheezing. Cardiovascular: Negative for chest pain, palpitations and leg swelling. Gastrointestinal: Negative for abdominal pain, diarrhea, heartburn, nausea and vomiting. Genitourinary: Negative for dysuria, frequency, hematuria and urgency. Musculoskeletal: Negative for joint pain and myalgias.    Skin: Negative for itching and rash.   Neurological: Negative for dizziness, seizures, weakness and headaches. Psychiatric/Behavioral: Negative for depression. The patient does not have insomnia. Objective:     Visit Vitals  /80   Pulse 67   Resp 18   Ht 5' 11\" (1.803 m)   Wt 99.3 kg (219 lb)   SpO2 96%   BMI 30.54 kg/m²       ECOG Performance Status (grade): 0  0 - able to carry on all pre-disease activity w/out restriction  1 - restricted but able to carry out light work  2 - ambulatory and can self- care but unable to carry out work  3 - bed or chair >50% of waking hours  4 - completely disable, total care, confined to bed or chair    Physical Exam  Constitutional:       General: He is not in acute distress. HENT:      Head: Normocephalic and atraumatic. Eyes:      Pupils: Pupils are equal, round, and reactive to light. Cardiovascular:      Pulses: Normal pulses. Heart sounds: Normal heart sounds. No murmur heard. Pulmonary:      Effort: Pulmonary effort is normal. No respiratory distress. Breath sounds: Normal breath sounds. Abdominal:      General: Bowel sounds are normal. There is no distension. Palpations: Abdomen is soft. There is no mass. Tenderness: There is no abdominal tenderness. There is no guarding. Musculoskeletal:         General: No swelling or tenderness. Cervical back: Neck supple. No rigidity. Lymphadenopathy:      Cervical: No cervical adenopathy. Skin:     General: Skin is warm. Findings: No rash. Neurological:      Mental Status: He is alert and oriented to person, place, and time. Mental status is at baseline. Cranial Nerves: No cranial nerve deficit. Psychiatric:         Mood and Affect: Mood normal.          Diagnostics:      No results found for this or any previous visit (from the past 96 hour(s)). Imaging:  No results found for this or any previous visit.       Results for orders placed during the hospital encounter of 05/31/16    XR SWALLOW [facility] VIDEO    Narrative  Modified barium swallow with video recording:        INDICATION:    Dysphagia. The study has been performed under supervision of speech therapist.    The patient swallowed thin liquid but there is been deep penetration almost to  the level of vocal cord. Then patient swallowed thin liquid food with small sips  and with chin-tucked position. At  this time, there has been no penetration or  aspiration of thin liquid into larynx. Then patient swallowed barium tablet. There has  been penetration of thin liquid used for swallowing tablet into larynx. There  is been no evidence of stagnation of tablet in hypopharynx or in upper  esophagus. Patient swallowed puree and cracker normally without penetration or  nasopharyngeal into larynx. Fluoroscopy time utilized is 48 seconds. No fluoroscopy images is obtained. Impression  : At first there is penetration of thin liquid into larynx as described. But when  the patient swallowed thin liquid in small sips with chin tuck position, there  is been no penetration or aspiration into larynx. Patient swallowed barium tablet but there has  been penetration of the liquid  used with a tablet. Swallowing of puree and cracker has been normal.      Results for orders placed during the hospital encounter of 01/10/22    CT ABD PELV W CONT    Narrative  EXAM:  CT Abdomen-Pelvis with Contrast.    CLINICAL INDICATION:  Gastrointestinal stromal tumor (C49.A0). COMPARISON:  04/07/21, 09/18/20. TECHNIQUE:    - Helical volumetric CT imaging of the abdomen and pelvis is performed following  IV contrast administration. Coronal and sagittal multiplanar reconstruction  images are generated for improved anatomic delineation.  - IV contrast 100 mL Isovue-300.   - All CT scans at this facility are performed using dose optimization technique  as appropriate to the performed exam, to include automated exposure control,  adjustment of the mA and/or kV according to patient's size (Including  appropriate matching for site-specific examinations), or use of iterative  reconstruction technique. FINDINGS:    Lung Bases:  Slightly heterogeneous lung attenuation pattern which may be  secondary to air trapping vs. emphysematous changes. Similar pattern compared  to prior studies. No airspace opacities. Liver: There are multiple tiny hepatic hypodensities, overall similar to prior  studies. The largest of the hepatic hypodensities is identified in the left  lobe adjacent to the umbilical fissure measuring about 1.0 x 0.7 cm (axial image  18). Gallbladder, Spleen, Pancreas, Kidneys:  Unremarkable. Adrenal Glands:  Stable 1.7 x 1.4 cm right adrenal gland nodule. Stable 1.7 x  1.3 cm and 1.4 x 1.3 cm left adrenal gland nodules. GI Tract, Abdominal Wall, Peritoneal Cavity:    - Extensive intra-abdominal and extra-abdominal masses. The largest of the  intra-abdominal mass is identified at about the level of the umbilicus measuring  about 12.0 x 6.9 cm (axial #53). This mass is decreased compared to 04/07/21,  previously measuring 218.0 x 10.0 cm. The left paramedian iliac fossa region  mass currently measures about 9.6 x 8.1 cm vs. 10.0 x 9.1 cm on 04/07/21. Some  of the masses to demonstrate distinct interval increase in size, i.e. upper  abdomen midline region mass as into the abdominal wall musculature currently  measures about 7.2 x 4.8 cm (axial #26) vs. 6.2 x 4.3 cm on 04/07/21. Subcutaneous mass in the right upper quadrant paramedian region measures about  5.7 x 3.4 cm, increased from about 5.0 x 2.9 cm on 04/07/21. This mass may be  the most readily amenable target to site for biopsy if tissue sampling is  needed. The overall total tumor burden does appear decreased compared to  04/07/21. - Unremarkable stomach and small bowel. Normal appendix. No acute colitis or  diverticulitis is detected along the colon.     Nodes:  No para-aortic or pericaval adenopathy. Vascular:  No acute abnormalities. Bladder:  Underdistended. Slight bladder wall thickening. Grossly unremarkable  prostate. Bones:  Unremarkable. Impression  1. Extensive intraabdominal and abdominal wall masses. Some of the lesions are  decreased in extent whereas some are increased in size. 2.  Stable multiple tiny hepatic hypodensities, favor hepatic cysts. 3.  Stable adrenal gland nodules. Favor benign process based on stability. PATHOLOGY  12/3/2020 ANTERIOR ABDOMINAL WALL MASS, BIOPSY:   GASTROINTESTINAL STROMAL TUMOR. GASTROINTESTINAL STROMAL TUMOR (GIST): Biopsy   SPECIMEN   Procedure: Core needle biopsy   TUMOR   Tumor Site: Anterior abdominal wall   Histologic Type: Gastrointestinal stromal tumor, spindle cell type   Histologic Grade: G1: Low grade; mitotic rate <= 5 / 5 mm2   Mitotic Rate: 1 mitoses per 5 mm2   Necrosis: Not identified   Risk Assessment: Cannot be determined: Recurrent   Treatment Effect: No known prebiopsy therapy     DIAGNOSIS COMMENT:   Given the patient's history of gastrointestinal stromal tumor, the findings are consistent with recurrent disease.     4/5/2022  Surgical Pathology Report                         Case: [id number]                                   Authorizing Provider: Zainab Kirkpatrick MD      Collected:           04/05/2022 0924               Ordering Location:     [facility]    Received:            04/06/2022 1126                                      [facility]                                                     Pathologist:           Rusty Frye MD                                                             Specimens:   A) - Epigastrium, Subcutaneous epigastric tumor in 10% Formalin                                      B) - Abdomen, Subcutaneous mass mid-abdomen in 10% Formalin                                          C) - Abdomen, Subcutaneous mass mid-abdomen #2 in 10% Formalin                                      D) - Abdomen, Intra abdominal mass mid-abdomen in 10% Formalin                                      E) - Abdomen, Intra abdominal mass mid-abdomen #2 in 10% Formalin                                    F) - Abdomen, Intra abdominal mass mid-abdomen #3 in 10% Formalin                                    G) - Abdomen, Intra abdominal mass mid-abdomen #4 in 10% Formalin                                    H) - Abdomen, Intra abdominal mass mid-abdomen #5 in 10% Formalin                                    I) - Tissue, Pericolonic mass in 10% Formalin                                                        J) - Suprapubic, suprapubic mass in 10% formalin                                                    K) - Suprapubic, suprapubic nodule mass in 10% formalin                                              L) - Abdomen, intra abdominal mass in 10% formalin                                                  M) - Suprapubic, suprapubic mass in #2 in 10% formalin                                              N) - Suprapubic, suprapubic mass #3 in 10% formalin                                                  O) - Sigmoid, Sigmoid nodule in 10% formalin                                                        P) - Nodule, left lower quadrant nodule in 10% formalin.                                            Q) - Tissue, left peritoneal nodule in 10% formalin.                                                R) - Abdominal Wall, abdominal wall mass in 10% formalin.                                            S) - Colon, colon mass in 10% formalin.                                                              T) - Mesentery, mesenteric mass in 10% formalin.                                                    U) - Omentum, omental nodule in 10% formalin.                                                        V) - Mesentery, mesenteric nodule in 10% formalin.                                                  W) - Tissue, right upper quadrant mass in 10% formalin.                                              X) - Diaphragm, Right diaphragm mass in 10% formalin.                                                Y) - Small Bowel, Small bowel                                                                        Z) - Diaphragm, Left Diaphragm                                                                      AA) - Nodule, subcutaneous nodule in 10% formalin.                                       Diagnosis     A.  SUBCUTANEOUS EPIGASTRIC TUMOR, EXCISION:          -  METASTATIC GASTROINTESTINAL STROMAL TUMOR (GIST), 2.9 CM.     B.  SUBCUTANEOUS MASS, MID ABDOMEN, EXCISION:          -  METASTATIC GASTROINTESTINAL STROMAL TUMOR (GIST), 5.9 CM WITH HEMORRHAGE AND THE TUMOR NECROSIS.     C.  SUBCUTANEOUS MASS, MID ABDOMEN #2, EXCISION:          -  METASTATIC GASTROINTESTINAL STROMAL TUMOR (GIST), 7.5 CM.     D. INTRAABDOMINAL MASS, MID ABDOMEN, EXCISION:          -  METASTATIC GASTROINTESTINAL STROMAL TUMOR (GIST), 9.2 CM WITH FOCAL HEMORRHAGE AND NECROSIS.     E.  INTRA-ABDOMINAL MASS, MID ABDOMEN, #2, EXCISION:          -  METASTATIC GASTROINTESTINAL STROMAL TUMOR (GIST), 3.7 CM WITH HEMORRHAGE.     F. INTRA-ABDOMINAL MASS, MID ABDOMEN #3, EXCISION:           -  METASTATIC GASTROINTESTINAL STROMAL TUMOR (GIST), 4.3 CM WITH TUMOR NECROSIS AND HEMORRHAGE.     G. INTRA-ABDOMINAL MASS, MID ABDOMEN #4, EXCISION:           -  FOCI OF METASTATIC GASTROINTESTINAL STROMAL TUMOR (GIST) , 1.7 AND 7.5 CM, FOCALLY HEMORRHAGIC AND NECROTIC.     H. INTRA-ABDOMINAL MASS, MID ABDOMEN #5, EXCISION:            -  METASTATIC GASTROINTESTINAL STROMAL TUMOR (GIST), 8.5 CM WITH HEMORRHAGE AND SEVERAL MICROSCOPIC TUMOR FOCI ADJACENT TO THE TUMOR MASS.     I. PERICOLONIC MASS, EXCISION:            - METASTATIC GASTROINTESTINAL STROMAL TUMOR (GIST), 4.7 CM.     J.   SUPRAPUBIC MASS, EXCISION:            - METASTATIC GASTROINTESTINAL STROMAL TUMOR (GIST), 7.8 CM WITH CYSTIC CHANGE.     K.  SUPRAPUBIC NODULE MASS, EXCISION:            - METASTATIC GASTROINTESTINAL STROMAL TUMOR (GIST), 5.5 CM WITH CYSTIC CHANGE.     L. INTRAABDOMINAL MASS, EXCISION:           -  METASTATIC GASTROINTESTINAL STROMAL TUMOR (GIST), 17 CM CM WITH TUMOR NECROSIS AND HEMORRHAGE.       M. SUPRAPUBIC MASS #2, EXCISION:           -  METASTATIC GASTROINTESTINAL STROMAL TUMOR (GIST), 7.5 CM AT THE END.      N. SUPRAPUBIC MASS #3, EXCISION:           -  METASTATIC GASTROINTESTINAL STROMAL TUMOR (GIST), 6.7 CM AT THE END.      O. SIGMOID NODULE, EXCISION:                 -  METASTATIC GASTROINTESTINAL STROMAL TUMOR (GIST), 2,9 CM WITH MARKED NECROSIS/HYALINIZATION OF TUMOR.      P. LEFT LOWER QUADRANT NODULE, EXCISION:            -  METASTATIC GASTROINTESTINAL STROMAL TUMOR (GIST), 11.3 CM WITH TUMOR NECROSIS AND HEMORRHAGE.      Q. LEFT PERITONEAL NODULE, EXCISION:            -  METASTATIC GASTROINTESTINAL STROMAL TUMOR (GIST), 2.7 CM.       R.  ABDOMINAL WALL MASS, EXCISION:            -  METASTATIC GASTROINTESTINAL STROMAL TUMOR (GIST), 5.5 CM WITH CYSTIC CHANGE AND HEMORRHAGE.      S.  COLON MASS, EXCISION:            -  METASTATIC GASTROINTESTINAL STROMAL TUMOR (GIST), TWO FOCI, ONE MEASURE 0.5 CM  AND THE OTHER 0.1 CM.      T.   MESENTERIC MASS, EXCISION:             -  METASTATIC GASTROINTESTINAL STROMAL TUMOR (GIST), UP TO 9.7 CM WITH EXTENSIVE TUMOR NECROSIS, HEMORRHAGE AND FOCAL CYSTIC CHANGE.     U.   OMENTAL NODULE, EXCISION:             -  METASTATIC GASTROINTESTINAL STROMAL TUMOR (GIST), UP TO 5.0 CM WITH EXTENSIVE TUMOR NECROSIS, HEMORRHAGE AND FOCAL CYSTIC CHANGE.     V.  MESENTERIC NODULE, EXCISION:             -  METASTATIC GASTROINTESTINAL STROMAL TUMOR (GIST), 0.4 CM.     W.   RIGHT UPPER QUADRANT MASS, EXCISION:             -  METASTATIC GASTROINTESTINAL STROMAL TUMOR (GIST), UP TO 4.7 CM WITH MARKEDLY HEMORRHAGE AND CYSTIC CHANGE.      X.    RIGHT DIAPHRAGM MASS, EXCISION:             -  METASTATIC GASTROINTESTINAL STROMAL TUMOR (GIST), UP TO 3.5 CM WITH EXTENSIVE TUMOR NECROSIS, HEMORRHAGE AND FOCAL CYSTIC CHANGE.     Y. SMALL BOWEL. RESECTION:            -  SEGMENT OF SMALL INTESTINE WITH FOCAL ADHESION AND SEROSITIS, NO TUMOR SEEN.     Z. LEFT DIAPHRAGM, EXCISION:            -  METASTATIC GASTROINTESTINAL STROMAL TUMOR (GIST), UP TO 1 CM.     AA. SUBCUTANEOUS NODULE, EXCISION:              -  METASTATIC GASTROINTESTINAL STROMAL TUMOR (GIST), UP TO 3.7 CM WITH FOCAL CYSTIC CHANGE.         (RP:aoc)     NOTE:  Original GIST biopsy/resection was done at an outside hospital and the primary site is small intestine per clinical history. The morphological features and clinical presentation of this tumor are those of a malignant GIST.        Electronic Signature: Oly Hernandez MD, PhD               Assessment:     1. Gastrointestinal stromal tumor ([facility] [address]    2. Encounter for antineoplastic chemotherapy    3. Abdominal mass, unspecified abdominal location    4. Polycythemia    5. Intractable chronic cluster headache        Plan:     Recurrent GIST  Abdominal wall mass  History of GIST 2010:  -- History of GIST, Dx 2010. S.p adjuvant Imatinib for about 1 year reportedly. -- 9/18/2020 CT reported extensive abdominal and pelvic mesenteric masses with some masses directly extending and penetrating through the anterior abdominal wall reflective of the given history of bulge. There are some regions of benign herniation but the vast majority is malignant. Findings may be primary and/or metastatic and within the spectrum of carcinomatosis. Left adrenal nodule new since prior exam. Metastasis is not excluded. Stable right adrenal nodule. Stable hepatic hypodensities.   -- 12/3/2020 Abdominal wall biopsy confirmed recurrent GIST.  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 Due to granular background staining, interpretation of PDL 1 immunohistochemical stain is not possible. -- He has seen Dr. Prema Wheat for initial evaluation of resection. I have discussed with Dr. Leslie Billings who suggested to start neoadjuvant TKIs prior to surgical resection. He was not interested in XRT at this point. 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 -- Since 2/16/2021 he was started Imatinib at 400 mg daily. He has tolerated therapy without high graded toxicities. He reported his tumors became smaller with therapy. -- 4/7/2021 CT C/A/P reported multiple abdominal and pelvic peritoneal masses mostly show mildly decrease in size and much decreased peripheral enhancing components since the prior CT, suggesting partial response to the treatment. No new lesion seen. -- Since his last visit on 5/26/2021, the patient did not keep follow up visit with our clinic. The patient reported he took [address] as 7 days on/3 days off (due to worsening headache) prior to his surgical resection. He stated she stopped Gleevec about 2 months before surgery. He was off Xarelto since started [address].   -- 4/5/2022 s/p exploratory laparotomy, small bowel resection, bladder repair, left ureterolysis, and excision of multiple abdominal and subcutaneous tissue tumors. + Post-operatively his paz was left in following his case since he underwent a bladder repair. The patient had a severe lung disease/ hypoxia-related symptoms that improves with oxygen therapy.  + Pathology report as above, multiple tumor deposits.    -- 6/9/2022 Brain MRI reported no finding for metastatic disease.  -- 6/9/2022 Restaging PET reported residual hypermetabolic metastatic deposits to include the anterior left pelvis omentum and left abdominal wall.  + Hypermetabolic soft tissue left retroperitoneum between the psoas muscle and  common iliac chain. May represent residual metastatic tumor deposit however the ureter is in this area and could be a potential cause for false positive.  + There is soft tissue thickening with less intense but moderately increased  metabolic activity in the midline at site of surgical incision and metastatic  mass on prior CT, interval surgically excised. Residual malignant tumor in this  area versus postsurgical change in the differential.  + There is very intense metabolic activity at the anus. This could be urologic  however correlation with physical examination suggested to help exclude  possibility of underlying tumor or inflammatory process in this area.  + No finding for FDG avid metastatic disease in the chest.  -- He states he will f/u Dr. Whitney Mckeon and DAWSON for a very intense metabolic activity at the anus found on recent PET. Denied any symptomatic issues. -- At previous visit we have discussed with the patient about systemic therapy with TKI sunitinib. Today he reported he was reluctant to start Sunitinib given concerned S/Es. Instead he would like to resume Gleevec at full dose. He states his cluster headache appeared unpredictable in the past and likely not related to [address] Chemo consent was signed. I have emphasized the importance of medical adherence and compliance. I discussed lab monitor closely while on antineoplastic chemotherapy. He states he can only afford labs checked monthly and visit in 2 months. Plan:  -- Imatinib 400 mg once daily until disease progression or unacceptable toxicity. -- Short-course Prednisone will be prescribed if worsening headache  -- F/u PCP to optimize HTN control  -- F/u Tumor profile Caris report  -- He will f/u Dr. Whitney Mckeon and DAWSON   -- We discussed lab monitor closely while on antineoplastic chemotherapy. He states he can only afford labs checked monthly and clinic visit in 2 months. -- Advised to contact us if any issues or concerns.    -- He will RTC 2 months. Always sooner if required. Cluster headache/ Intractable headache  -- Advised to f/u Neurology for long term control. -- Brain MRI as above  -- Short-course Prednisone will be prescribed if worsening headache      CNIV  -- Zofran PRN      Pulmonary embolus  -- He was diagnosed with one-time pulmonary embolus in May of 2016. Patient was being followed by Dr. Dana Yun who retired recently. He states that he is taking Xarelto 20mg PO daily since then. Thrombophilia w/u was negative reportedly. -- 10/12/2020 CTA reported no evidence of pulmonary embolism. -- Xarelto was d/c. Patient was advised to continue on ASA 81 mg for thromboprophylaxis. Polycythemia  -- He is an active smoker. He did not receive his phlebotomy for months for his polycythemia. Negative (wild type) for the JAK2 [id number].   -- His polycythemia was likely secondary 2/2 smoking, COPD. Recent CBC on 7/2020 showed improving H/H, 14/42. 4. I have counseled him on smoking cessation. -- Monitor CBC periodically      Orders Placed This Encounter    imatinib (Gleevec) 400 mg tablet     Sig: Take one tab by mouth daily with a large glass of water     Dispense:  30 Tablet     Refill:  3     Brand name required    predniSONE (DELTASONE) 10 mg tablet     Sig: Take 10 mg by mouth daily (with breakfast) for 7 days. Dispense:  7 Tablet     Refill:  0              All of patient's questions answered to their apparent satisfaction. They verbally show understanding and agreement with aforementioned plan. Gasper Pineda MD          Parts of this document has been produced using Dragon dictation system. Unrecognized errors in transcription may be present. Please do not hesitate to reach out for any questions or clarifications.       CC: RADHA White;

## 2022-07-14 ENCOUNTER — NURSE NAVIGATOR (OUTPATIENT)
Dept: OTHER | Age: 57
End: 2022-07-14

## 2022-07-14 NOTE — NURSE NAVIGATOR
Follow up call to patient in reference to [facility], [facility] to know if pt received and started treatment.  Pt at [facility] message for pt to call office at [phone]

## 2022-07-14 NOTE — NURSE NAVIGATOR
Spoke with pt who stated that he started taking Gleevec last night 7/13/22-wanted to know if he should have labs done tomorrow. Per Dr. Kortney Mclaughlin pt can do labs in 2 weeks. [facility] will call pt to reschedule.

## 2022-07-28 DIAGNOSIS — D75.1 POLYCYTHEMIA: ICD-10-CM

## 2022-07-28 DIAGNOSIS — C49.A0 GASTROINTESTINAL STROMAL TUMOR (HCC): ICD-10-CM

## 2022-07-28 DIAGNOSIS — D75.1 ERYTHROCYTOSIS: ICD-10-CM

## 2022-07-28 DIAGNOSIS — R19.00 ABDOMINAL MASS, UNSPECIFIED ABDOMINAL LOCATION: ICD-10-CM

## 2022-07-29 ENCOUNTER — LAB ONLY (OUTPATIENT)
Dept: ONCOLOGY | Age: 57
End: 2022-07-29

## 2022-07-29 DIAGNOSIS — C49.A0 GASTROINTESTINAL STROMAL TUMOR (HCC): Primary | ICD-10-CM

## 2022-07-29 DIAGNOSIS — D75.1 POLYCYTHEMIA: ICD-10-CM

## 2022-07-29 DIAGNOSIS — D75.1 ERYTHROCYTOSIS: ICD-10-CM

## 2022-07-29 DIAGNOSIS — R19.00 ABDOMINAL MASS, UNSPECIFIED ABDOMINAL LOCATION: ICD-10-CM

## 2022-07-30 LAB
A-G RATIO,AGRAT: 1.5 RATIO (ref 1.1–2.6)
ABSOLUTE LYMPHOCYTE COUNT, 10803: 1.8 K/UL (ref 1–4.8)
ALBUMIN SERPL-MCNC: 3.9 G/DL (ref 3.5–5)
ALP SERPL-CCNC: 56 U/L (ref 25–115)
ALT SERPL-CCNC: 15 U/L (ref 5–40)
ANION GAP SERPL CALC-SCNC: 12 MMOL/L (ref 3–15)
AST SERPL W P-5'-P-CCNC: 17 U/L (ref 10–37)
BASOPHILS # BLD: 0.2 K/UL (ref 0–0.2)
BASOPHILS NFR BLD: 2 % (ref 0–2)
BILIRUB SERPL-MCNC: 0.2 MG/DL (ref 0.2–1.2)
BUN SERPL-MCNC: 12 MG/DL (ref 6–22)
CALCIUM SERPL-MCNC: 9 MG/DL (ref 8.4–10.5)
CHLORIDE SERPL-SCNC: 99 MMOL/L (ref 98–110)
CO2 SERPL-SCNC: 26 MMOL/L (ref 20–32)
CREAT SERPL-MCNC: 0.9 MG/DL (ref 0.5–1.2)
EOSINOPHIL # BLD: 0.9 K/UL (ref 0–0.5)
EOSINOPHIL NFR BLD: 11 % (ref 0–6)
ERYTHROCYTE [DISTWIDTH] IN BLOOD BY AUTOMATED COUNT: 16.6 % (ref 10–15.5)
GLOBULIN,GLOB: 2.6 G/DL (ref 2–4)
GLOMERULAR FILTRATION RATE: >60 ML/MIN/1.73 SQ.M.
GLUCOSE SERPL-MCNC: 98 MG/DL (ref 70–99)
GRANULOCYTES,GRANS: 59 % (ref 40–75)
HCT VFR BLD AUTO: 46.3 % (ref 39.3–51.6)
HGB BLD-MCNC: 14.6 G/DL (ref 13.1–17.2)
LYMPHOCYTES, LYMLT: 21 % (ref 20–45)
MCH RBC QN AUTO: 30 PG (ref 26–34)
MCHC RBC AUTO-ENTMCNC: 32 G/DL (ref 31–36)
MCV RBC AUTO: 94 FL (ref 80–95)
MONOCYTES # BLD: 0.6 K/UL (ref 0.1–1)
MONOCYTES NFR BLD: 7 % (ref 3–12)
NEUTROPHILS # BLD AUTO: 5.1 K/UL (ref 1.8–7.7)
PLATELET # BLD AUTO: 236 K/UL (ref 140–440)
PMV BLD AUTO: 11 FL (ref 9–13)
POTASSIUM SERPL-SCNC: 4.5 MMOL/L (ref 3.5–5.5)
PROT SERPL-MCNC: 6.5 G/DL (ref 6.4–8.3)
RBC # BLD AUTO: 4.93 M/UL (ref 3.8–5.8)
SODIUM SERPL-SCNC: 137 MMOL/L (ref 133–145)
WBC # BLD AUTO: 8.6 K/UL (ref 4–11)

## 2022-08-11 ENCOUNTER — LAB ONLY (OUTPATIENT)
Dept: ONCOLOGY | Age: 57
End: 2022-08-11

## 2022-08-11 DIAGNOSIS — D75.1 ERYTHROCYTOSIS: ICD-10-CM

## 2022-08-11 DIAGNOSIS — D75.1 POLYCYTHEMIA: ICD-10-CM

## 2022-08-11 DIAGNOSIS — R19.00 ABDOMINAL MASS, UNSPECIFIED ABDOMINAL LOCATION: ICD-10-CM

## 2022-08-11 DIAGNOSIS — C49.A0 GASTROINTESTINAL STROMAL TUMOR (HCC): Primary | ICD-10-CM

## 2022-08-11 DIAGNOSIS — C49.A0 GASTROINTESTINAL STROMAL TUMOR (HCC): ICD-10-CM

## 2022-08-12 LAB
A-G RATIO,AGRAT: 1.7 RATIO (ref 1.1–2.6)
ABSOLUTE LYMPHOCYTE COUNT, 10803: 2 K/UL (ref 1–4.8)
ALBUMIN SERPL-MCNC: 3.8 G/DL (ref 3.5–5)
ALP SERPL-CCNC: 51 U/L (ref 25–115)
ALT SERPL-CCNC: 17 U/L (ref 5–40)
ANION GAP SERPL CALC-SCNC: 11 MMOL/L (ref 3–15)
AST SERPL W P-5'-P-CCNC: 18 U/L (ref 10–37)
BASOPHILS # BLD: 0.1 K/UL (ref 0–0.2)
BASOPHILS NFR BLD: 2 % (ref 0–2)
BILIRUB SERPL-MCNC: 0.3 MG/DL (ref 0.2–1.2)
BUN SERPL-MCNC: 11 MG/DL (ref 6–22)
CALCIUM SERPL-MCNC: 8.5 MG/DL (ref 8.4–10.5)
CHLORIDE SERPL-SCNC: 99 MMOL/L (ref 98–110)
CO2 SERPL-SCNC: 25 MMOL/L (ref 20–32)
CREAT SERPL-MCNC: 0.8 MG/DL (ref 0.5–1.2)
EOSINOPHIL # BLD: 0.7 K/UL (ref 0–0.5)
EOSINOPHIL NFR BLD: 10 % (ref 0–6)
ERYTHROCYTE [DISTWIDTH] IN BLOOD BY AUTOMATED COUNT: 16.5 % (ref 10–15.5)
GLOBULIN,GLOB: 2.2 G/DL (ref 2–4)
GLOMERULAR FILTRATION RATE: >60 ML/MIN/1.73 SQ.M.
GLUCOSE SERPL-MCNC: 88 MG/DL (ref 70–99)
GRANULOCYTES,GRANS: 55 % (ref 40–75)
HCT VFR BLD AUTO: 44.1 % (ref 39.3–51.6)
HGB BLD-MCNC: 14.5 G/DL (ref 13.1–17.2)
LYMPHOCYTES, LYMLT: 28 % (ref 20–45)
MCH RBC QN AUTO: 30 PG (ref 26–34)
MCHC RBC AUTO-ENTMCNC: 33 G/DL (ref 31–36)
MCV RBC AUTO: 92 FL (ref 80–95)
MONOCYTES # BLD: 0.4 K/UL (ref 0.1–1)
MONOCYTES NFR BLD: 6 % (ref 3–12)
NEUTROPHILS # BLD AUTO: 3.9 K/UL (ref 1.8–7.7)
PLATELET # BLD AUTO: 202 K/UL (ref 140–440)
PMV BLD AUTO: 10.8 FL (ref 9–13)
POTASSIUM SERPL-SCNC: 4.2 MMOL/L (ref 3.5–5.5)
PROT SERPL-MCNC: 6 G/DL (ref 6.4–8.3)
RBC # BLD AUTO: 4.78 M/UL (ref 3.8–5.8)
SODIUM SERPL-SCNC: 135 MMOL/L (ref 133–145)
WBC # BLD AUTO: 7.1 K/UL (ref 4–11)

## 2022-08-25 ENCOUNTER — OFFICE VISIT (OUTPATIENT)
Dept: ONCOLOGY | Age: 57
End: 2022-08-25
Payer: COMMERCIAL

## 2022-08-25 VITALS
DIASTOLIC BLOOD PRESSURE: 87 MMHG | SYSTOLIC BLOOD PRESSURE: 133 MMHG | HEART RATE: 61 BPM | OXYGEN SATURATION: 94 % | BODY MASS INDEX: 32 KG/M2 | TEMPERATURE: 97.8 F | HEIGHT: 71 IN | WEIGHT: 228.6 LBS

## 2022-08-25 DIAGNOSIS — C49.A0 GASTROINTESTINAL STROMAL TUMOR (HCC): Primary | ICD-10-CM

## 2022-08-25 DIAGNOSIS — R19.00 ABDOMINAL MASS, UNSPECIFIED ABDOMINAL LOCATION: ICD-10-CM

## 2022-08-25 DIAGNOSIS — Z79.899 ON ANTINEOPLASTIC CHEMOTHERAPY: ICD-10-CM

## 2022-08-25 DIAGNOSIS — D75.1 POLYCYTHEMIA: ICD-10-CM

## 2022-08-25 PROCEDURE — 99214 OFFICE O/P EST MOD 30 MIN: CPT | Performed by: INTERNAL MEDICINE

## 2022-08-25 NOTE — PROGRESS NOTES
Hematology/Oncology  Progress Note    Name: Radha Clements  Date: 2022  : 1965    RADHA Hagan     Mr. Tiffany Arellano is a 59y.o. year old male who was seen for recurrent GIST. Subjective:     Mr. Tiffany Arellano is a 47-year-old man who was diagnosed with a pulmonary embolus in May of 2016. Patient was being followed by Dr. Jose Ferguson who retired. He states that he is taking Xarelto 20mg PO daily. He admits smoking 1 ppd from 1 1/2ppd and states he is willing to cut back some more until he totally quits. He went to abdominal wall biopsy recently which confirmed recurrent GIST. He has hx of GIST in . He reported he was taking Gleevec for about one year after surgery at that time. He has started [address] since 2021. Today he denied significant pain or vomiting or altered bowel movements. He is an active smoker. He did not receive his phlebotomy for months for his polycythemia. Today he reported doing stable with Gleevec. He denied fever, chills, night sweat, unintentional weight loss, skin lumps or bumps, acute bleeding or bruising issues. No acute bleeding, blood in stool, dark stool, melena, hematochezia, hemoptysis, dark urine, or easily bruising. Denied headache, acute vision change, dizziness, chest pain, shortness of breath, palpitation, productive cough, vomiting, abdominal pain, altered bowel habits, dysuria, new bone pain or back pain, focal numbness or weakness. Past medical history, family history, and social history: these were reviewed and remains unchanged.     Past Medical History:   Diagnosis Date    CAD (coronary artery disease)     Carpal tunnel syndrome     COPD     Depression     H/O echocardiogram 2017    EF 35-40%, mild LVH, biatrial enlargement, mild TR, RVSP 41    Hemorrhoids     History of esophagitis     History of malignant gastrointestinal stromal tumor (GIST)     Hypercholesterolemia     Hypertension     Myocardial infarction [facility])     Pulmonary embolus [facility]) June 2016    Bilateral    S/P CABG x 3 2009    LIMA to LAD, SVG to RPDA, SVG to diagonal    S/P cardiac catheterization November 2011    Patent GALLAGHER to LAD, patent SVG to diet, and occluded SVG to RPDA,  native LAD 85% proximal stenosis, left circumflex 20% diffuse disease, RCA distal 70% stenosis,, EF 45%    Secondary polycythemia      Past Surgical History:   Procedure Laterality Date    HX CORONARY STENT PLACEMENT      HX CYST REMOVAL      HX GI      tumor near stomach    HX HEENT      deviated septum    HX HEMORRHOIDECTOMY      HX ORTHOPAEDIC      knee injury right    HX OTHER SURGICAL      resection of mesenteric mass    NV CARDIAC SURG PROCEDURE UNLIST      stent     Social History     Socioeconomic History    Marital status:      Spouse name: Not on file    Number of children: Not on file    Years of education: Not on file    Highest education level: Not on file   Occupational History    Not on file   Tobacco Use    Smoking status: Every Day     Packs/day: 1.50     Types: Cigarettes    Smokeless tobacco: Never   Substance and Sexual Activity    Alcohol use: Yes     Comment: \"I haven't drank in 2 months, but I was drinking about 12 pk beer per week\"     Drug use: No    Sexual activity: Not on file   Other Topics Concern    Not on file   Social History Narrative    Not on file     Social Determinants of Health     Financial Resource Strain: Not on file   Food Insecurity: Not on file   Transportation Needs: Not on file   Physical Activity: Not on file   Stress: Not on file   Social Connections: Not on file   Intimate Partner Violence: Not on file   Housing Stability: Not on file     No family history on file.   Current Outpatient Medications   Medication Sig Dispense Refill    imatinib (Gleevec) 400 mg tablet Take one tab by mouth daily with a large glass of water 30 Tablet 3    ramipriL (ALTACE) 10 mg capsule take 1 capsule by mouth once daily 90 Capsule 3    hydroCHLOROthiazide (HYDRODIURIL) 25 mg tablet take 1 tablet by mouth once daily 30 Tablet 6    metoprolol tartrate (LOPRESSOR) 25 mg tablet take 1 tablet by mouth twice a day 180 Tablet 3    aspirin (ASPIRIN) 325 mg tablet Take 325 mg by mouth daily. traZODone (DESYREL) 150 mg tablet Take 150 mg by mouth nightly. Review of Systems   Constitutional:  Positive for malaise/fatigue. Negative for chills, diaphoresis, fever and weight loss. Respiratory:  Negative for cough, hemoptysis, shortness of breath and wheezing. Cardiovascular:  Negative for chest pain, palpitations and leg swelling. Gastrointestinal:  Negative for abdominal pain, diarrhea, heartburn, nausea and vomiting. Genitourinary:  Negative for dysuria, frequency, hematuria and urgency. Musculoskeletal:  Negative for joint pain and myalgias. Skin:  Negative for itching and rash. Neurological:  Negative for dizziness, seizures, weakness and headaches. Psychiatric/Behavioral:  Negative for depression. The patient does not have insomnia. Objective:     Visit Vitals  /87   Pulse 61   Temp 97.8 °F (36.6 °C)   Ht 5' 11\" (1.803 m)   Wt 103.7 kg (228 lb 9.6 oz)   SpO2 94%   BMI 31.88 kg/m²       ECOG Performance Status (grade): 0  0 - able to carry on all pre-disease activity w/out restriction  1 - restricted but able to carry out light work  2 - ambulatory and can self- care but unable to carry out work  3 - bed or chair >50% of waking hours  4 - completely disable, total care, confined to bed or chair    Physical Exam  Constitutional:       General: He is not in acute distress. HENT:      Head: Normocephalic and atraumatic. Eyes:      Pupils: Pupils are equal, round, and reactive to light. Cardiovascular:      Pulses: Normal pulses. Heart sounds: Normal heart sounds. No murmur heard. Pulmonary:      Effort: Pulmonary effort is normal. No respiratory distress. Breath sounds: Normal breath sounds.    Abdominal:      General: Bowel sounds are normal. There is no distension. Palpations: Abdomen is soft. There is no mass. Tenderness: no abdominal tenderness There is no guarding. Musculoskeletal:         General: No swelling or tenderness. Cervical back: Neck supple. No rigidity. Lymphadenopathy:      Cervical: No cervical adenopathy. Skin:     General: Skin is warm. Findings: No rash. Neurological:      Mental Status: He is alert and oriented to person, place, and time. Mental status is at baseline. Cranial Nerves: No cranial nerve deficit. Psychiatric:         Mood and Affect: Mood normal.        Diagnostics:      No results found for this or any previous visit (from the past 96 hour(s)). Imaging:  No results found for this or any previous visit. Results for orders placed during the hospital encounter of 05/31/16    XR SWALLOW FUNC VIDEO    Narrative  Modified barium swallow with video recording:        INDICATION:    Dysphagia. The study has been performed under supervision of speech therapist.    The patient swallowed thin liquid but there is been deep penetration almost to  the level of vocal cord. Then patient swallowed thin liquid food with small sips  and with chin-tucked position. At  this time, there has been no penetration or  aspiration of thin liquid into larynx. Then patient swallowed barium tablet. There has  been penetration of thin liquid used for swallowing tablet into larynx. There  is been no evidence of stagnation of tablet in hypopharynx or in upper  esophagus. Patient swallowed puree and cracker normally without penetration or  nasopharyngeal into larynx. Fluoroscopy time utilized is 48 seconds. No fluoroscopy images is obtained. Impression  : At first there is penetration of thin liquid into larynx as described.  But when  the patient swallowed thin liquid in small sips with chin tuck position, there  is been no penetration or aspiration into larynx. Patient swallowed barium tablet but there has  been penetration of the liquid  used with a tablet. Swallowing of puree and cracker has been normal.      Results for orders placed during the hospital encounter of 01/10/22    CT ABD PELV W CONT    Narrative  EXAM:  CT Abdomen-Pelvis with Contrast.    CLINICAL INDICATION:  Gastrointestinal stromal tumor (C49.A0). COMPARISON:  04/07/21, 09/18/20. TECHNIQUE:    - Helical volumetric CT imaging of the abdomen and pelvis is performed following  IV contrast administration. Coronal and sagittal multiplanar reconstruction  images are generated for improved anatomic delineation.  - IV contrast 100 mL Isovue-300. - All CT scans at this facility are performed using dose optimization technique  as appropriate to the performed exam, to include automated exposure control,  adjustment of the mA and/or kV according to patient's size (Including  appropriate matching for site-specific examinations), or use of iterative  reconstruction technique. FINDINGS:    Lung Bases:  Slightly heterogeneous lung attenuation pattern which may be  secondary to air trapping vs. emphysematous changes. Similar pattern compared  to prior studies. No airspace opacities. Liver: There are multiple tiny hepatic hypodensities, overall similar to prior  studies. The largest of the hepatic hypodensities is identified in the left  lobe adjacent to the umbilical fissure measuring about 1.0 x 0.7 cm (axial image  18). Gallbladder, Spleen, Pancreas, Kidneys:  Unremarkable. Adrenal Glands:  Stable 1.7 x 1.4 cm right adrenal gland nodule. Stable 1.7 x  1.3 cm and 1.4 x 1.3 cm left adrenal gland nodules. GI Tract, Abdominal Wall, Peritoneal Cavity:    - Extensive intra-abdominal and extra-abdominal masses. The largest of the  intra-abdominal mass is identified at about the level of the umbilicus measuring  about 12.0 x 6.9 cm (axial #53).   This mass is decreased compared to 04/07/21,  previously measuring 218.0 x 10.0 cm. The left paramedian iliac fossa region  mass currently measures about 9.6 x 8.1 cm vs. 10.0 x 9.1 cm on 04/07/21. Some  of the masses to demonstrate distinct interval increase in size, i.e. upper  abdomen midline region mass as into the abdominal wall musculature currently  measures about 7.2 x 4.8 cm (axial #26) vs. 6.2 x 4.3 cm on 04/07/21. Subcutaneous mass in the right upper quadrant paramedian region measures about  5.7 x 3.4 cm, increased from about 5.0 x 2.9 cm on 04/07/21. This mass may be  the most readily amenable target to site for biopsy if tissue sampling is  needed. The overall total tumor burden does appear decreased compared to  04/07/21. - Unremarkable stomach and small bowel. Normal appendix. No acute colitis or  diverticulitis is detected along the colon. Nodes:  No para-aortic or pericaval adenopathy. Vascular:  No acute abnormalities. Bladder:  Underdistended. Slight bladder wall thickening. Grossly unremarkable  prostate. Bones:  Unremarkable. Impression  1. Extensive intraabdominal and abdominal wall masses. Some of the lesions are  decreased in extent whereas some are increased in size. 2.  Stable multiple tiny hepatic hypodensities, favor hepatic cysts. 3.  Stable adrenal gland nodules. Favor benign process based on stability. PATHOLOGY  12/3/2020 ANTERIOR ABDOMINAL WALL MASS, BIOPSY:   GASTROINTESTINAL STROMAL TUMOR.    GASTROINTESTINAL STROMAL TUMOR (GIST): Biopsy   SPECIMEN   Procedure: Core needle biopsy   TUMOR   Tumor Site: Anterior abdominal wall   Histologic Type: Gastrointestinal stromal tumor, spindle cell type   Histologic Grade: G1: Low grade; mitotic rate <= 5 / 5 mm2   Mitotic Rate: 1 mitoses per 5 mm2   Necrosis: Not identified   Risk Assessment: Cannot be determined: Recurrent   Treatment Effect: No known prebiopsy therapy     DIAGNOSIS COMMENT:   Given the patient's history of gastrointestinal stromal tumor, the findings are consistent with recurrent disease.     4/5/2022  Surgical Pathology Report                         Case: [id number]                                   Authorizing Provider:  Tanya Peck MD      Collected:           04/05/2022 9530               Ordering Location:     [facility]    Received:            04/06/2022 [address] [place] [address]                                                     Pathologist:           Yris Vaughan MD                                                             Specimens:   A) - Epigastrium, Subcutaneous epigastric tumor in 10% Formalin                                      B) - Abdomen, Subcutaneous mass mid-abdomen in 10% Formalin                                          C) - Abdomen, Subcutaneous mass mid-abdomen #2 in 10% Formalin                                      D) - Abdomen, Intra abdominal mass mid-abdomen in 10% Formalin                                      E) - Abdomen, Intra abdominal mass mid-abdomen #2 in 10% Formalin                                    F) - Abdomen, Intra abdominal mass mid-abdomen #3 in 10% Formalin                                    G) - Abdomen, Intra abdominal mass mid-abdomen #4 in 10% Formalin                                    H) - Abdomen, Intra abdominal mass mid-abdomen #5 in 10% Formalin                                    I) - Tissue, Pericolonic mass in 10% Formalin                                                        J) - Suprapubic, suprapubic mass in 10% formalin                                                    K) - Suprapubic, suprapubic nodule mass in 10% formalin                                              L) - Abdomen, intra abdominal mass in 10% formalin                                                  M) - Suprapubic, suprapubic mass in #2 in 10% formalin                                              N) - Suprapubic, suprapubic mass #3 in 10% formalin                                                  O) - Sigmoid, Sigmoid nodule in 10% formalin                                                        P) - Nodule, left lower quadrant nodule in 10% formalin. Q) - Tissue, left peritoneal nodule in 10% formalin. R) - Abdominal Wall, abdominal wall mass in 10% formalin. S) - Colon, colon mass in 10% formalin. T) - Mesentery, mesenteric mass in 10% formalin. U) - Omentum, omental nodule in 10% formalin. V) - Mesentery, mesenteric nodule in 10% formalin. W) - Tissue, right upper quadrant mass in 10% formalin. X) - Diaphragm, Right diaphragm mass in 10% formalin. Y) - Small Bowel, Small bowel                                                                        Z) - Diaphragm, Left Diaphragm                                                                      AA) - Nodule, subcutaneous nodule in 10% formalin. Diagnosis     A.  SUBCUTANEOUS EPIGASTRIC TUMOR, EXCISION:          -  METASTATIC GASTROINTESTINAL STROMAL TUMOR (GIST), 2.9 CM. B.  SUBCUTANEOUS MASS, MID ABDOMEN, EXCISION:          -  METASTATIC GASTROINTESTINAL STROMAL TUMOR (GIST), 5.9 CM WITH HEMORRHAGE AND THE TUMOR NECROSIS. C.  SUBCUTANEOUS MASS, MID ABDOMEN #2, EXCISION:          -  METASTATIC GASTROINTESTINAL STROMAL TUMOR (GIST), 7.5 CM. D.  INTRAABDOMINAL MASS, MID ABDOMEN, EXCISION:          -  METASTATIC GASTROINTESTINAL STROMAL TUMOR (GIST), 9.2 CM WITH FOCAL HEMORRHAGE AND NECROSIS.      E. INTRA-ABDOMINAL MASS, MID ABDOMEN, #2, EXCISION:          -  METASTATIC GASTROINTESTINAL STROMAL TUMOR (GIST), 3.7 CM WITH HEMORRHAGE. F.  INTRA-ABDOMINAL MASS, MID ABDOMEN #3, EXCISION:           -  METASTATIC GASTROINTESTINAL STROMAL TUMOR (GIST), 4.3 CM WITH TUMOR NECROSIS AND HEMORRHAGE. G.  INTRA-ABDOMINAL MASS, MID ABDOMEN #4, EXCISION:           -  FOCI OF METASTATIC GASTROINTESTINAL STROMAL TUMOR (GIST) , 1.7 AND 7.5 CM, FOCALLY HEMORRHAGIC AND NECROTIC. H.  INTRA-ABDOMINAL MASS, MID ABDOMEN #5, EXCISION:            -  METASTATIC GASTROINTESTINAL STROMAL TUMOR (GIST), 8.5 CM WITH HEMORRHAGE AND SEVERAL MICROSCOPIC TUMOR FOCI ADJACENT TO THE TUMOR MASS. I.   PERICOLONIC MASS, EXCISION:            - METASTATIC GASTROINTESTINAL STROMAL TUMOR (GIST), 4.7 CM. J.  SUPRAPUBIC MASS, EXCISION:            - METASTATIC GASTROINTESTINAL STROMAL TUMOR (GIST), 7.8 CM WITH CYSTIC CHANGE.     K.  SUPRAPUBIC NODULE MASS, EXCISION:            - METASTATIC GASTROINTESTINAL STROMAL TUMOR (GIST), 5.5 CM WITH CYSTIC CHANGE.     L.  INTRAABDOMINAL MASS, EXCISION:           -  METASTATIC GASTROINTESTINAL STROMAL TUMOR (GIST), 17 CM CM WITH TUMOR NECROSIS AND HEMORRHAGE. Marlee Punches MASS #2, EXCISION:           -  METASTATIC GASTROINTESTINAL STROMAL TUMOR (GIST), 7.5 CM AT THE END. Ruthellen Ore #3, EXCISION:           -  METASTATIC GASTROINTESTINAL STROMAL TUMOR (GIST), 6.7 CM AT THE END. O.  SIGMOID NODULE, EXCISION:                 -  METASTATIC GASTROINTESTINAL STROMAL TUMOR (GIST), 2,9 CM WITH MARKED NECROSIS/HYALINIZATION OF TUMOR. P.   LEFT LOWER QUADRANT NODULE, EXCISION:            -  METASTATIC GASTROINTESTINAL STROMAL TUMOR (GIST), 11.3 CM WITH TUMOR NECROSIS AND HEMORRHAGE. Q.  LEFT PERITONEAL NODULE, EXCISION:            -  METASTATIC GASTROINTESTINAL STROMAL TUMOR (GIST), 2.7 CM.        R.  ABDOMINAL WALL MASS, EXCISION:            -  METASTATIC GASTROINTESTINAL STROMAL TUMOR (GIST), 5.5 CM WITH CYSTIC CHANGE AND HEMORRHAGE. S.  COLON MASS, EXCISION:            -  METASTATIC GASTROINTESTINAL STROMAL TUMOR (GIST), TWO FOCI, ONE MEASURE 0.5 CM  AND THE OTHER 0.1 CM. T.   MESENTERIC MASS, EXCISION:             -  METASTATIC GASTROINTESTINAL STROMAL TUMOR (GIST), UP TO 9.7 CM WITH EXTENSIVE TUMOR NECROSIS, HEMORRHAGE AND FOCAL CYSTIC CHANGE. U.   OMENTAL NODULE, EXCISION:             -  METASTATIC GASTROINTESTINAL STROMAL TUMOR (GIST), UP TO 5.0 CM WITH EXTENSIVE TUMOR NECROSIS, HEMORRHAGE AND FOCAL CYSTIC CHANGE.     V.  MESENTERIC NODULE, EXCISION:             -  METASTATIC GASTROINTESTINAL STROMAL TUMOR (GIST), 0.4 CM. W.  RIGHT UPPER QUADRANT MASS, EXCISION:             -  METASTATIC GASTROINTESTINAL STROMAL TUMOR (GIST), UP TO 4.7 CM WITH MARKEDLY HEMORRHAGE AND CYSTIC CHANGE. X.    RIGHT DIAPHRAGM MASS, EXCISION:             -  METASTATIC GASTROINTESTINAL STROMAL TUMOR (GIST), UP TO 3.5 CM WITH EXTENSIVE TUMOR NECROSIS, HEMORRHAGE AND FOCAL CYSTIC CHANGE. Y.  SMALL BOWEL. RESECTION:            -  SEGMENT OF SMALL INTESTINE WITH FOCAL ADHESION AND SEROSITIS, NO TUMOR SEEN. Z.  LEFT DIAPHRAGM, EXCISION:            -  METASTATIC GASTROINTESTINAL STROMAL TUMOR (GIST), UP TO 1 CM. AA.  SUBCUTANEOUS NODULE, EXCISION:              -  METASTATIC GASTROINTESTINAL STROMAL TUMOR (GIST), UP TO 3.7 CM WITH FOCAL CYSTIC CHANGE.         (RP:aoc)     NOTE:  Original GIST biopsy/resection was done at an outside hospital and the primary site is small intestine per clinical history. The morphological features and clinical presentation of this tumor are those of a malignant GIST. Electronic Signature: Jennifer Villavicencio MD, PhD               Assessment:     1. Gastrointestinal stromal tumor ([facility] [address]    2. Abdominal mass, unspecified abdominal location    3. Polycythemia    4.  On antineoplastic chemotherapy        Plan:     Recurrent GIST  Abdominal wall mass  History of GIST 2010:  -- History of GIST, Dx 2010. S.p adjuvant Imatinib for about 1 year reportedly. -- 9/18/2020 CT reported extensive abdominal and pelvic mesenteric masses with some masses directly extending and penetrating through the anterior abdominal wall reflective of the given history of bulge. There are some regions of benign herniation but the vast majority is malignant. Findings may be primary and/or metastatic and within the spectrum of carcinomatosis. Left adrenal nodule new since prior exam. Metastasis is not excluded. Stable right adrenal nodule. Stable hepatic hypodensities. -- 12/3/2020 Abdominal wall biopsy confirmed recurrent GIST.  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 Due to granular background staining, interpretation of PDL 1 immunohistochemical stain is not possible. -- He has seen Dr. Pelon Chavez for initial evaluation of resection. I have discussed with Dr. Isadora Ghotra who suggested to start neoadjuvant TKIs prior to surgical resection. He was not interested in XRT at this point. 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 -- Since 2/16/2021 he was started Imatinib at 400 mg daily. He has tolerated therapy without high graded toxicities. He reported his tumors became smaller with therapy. -- 4/7/2021 CT C/A/P reported multiple abdominal and pelvic peritoneal masses mostly show mildly decrease in size and much decreased peripheral enhancing components since the prior CT, suggesting partial response to the treatment. No new lesion seen. -- Since his last visit on 5/26/2021, the patient did not keep follow up visit with our clinic. The patient reported he took [address] as 7 days on/3 days off (due to worsening headache) prior to his surgical resection.  He stated she stopped Gleevec about 2 months before surgery. He was off Xarelto since started [address].   -- 4/5/2022 s/p exploratory laparotomy, small bowel resection, bladder repair, left ureterolysis, and excision of multiple abdominal and subcutaneous tissue tumors. + Post-operatively his paz was left in following his case since he underwent a bladder repair. The patient had a severe lung disease/ hypoxia-related symptoms that improves with oxygen therapy.  + Pathology report as above, multiple tumor deposits. -- 6/9/2022 Brain MRI reported no finding for metastatic disease.  -- 6/9/2022 Restaging PET reported residual hypermetabolic metastatic deposits to include the anterior left pelvis omentum and left abdominal wall.  + Hypermetabolic soft tissue left retroperitoneum between the psoas muscle and  common iliac chain. May represent residual metastatic tumor deposit however the ureter is in this area and could be a potential cause for false positive.  + There is soft tissue thickening with less intense but moderately increased  metabolic activity in the midline at site of surgical incision and metastatic  mass on prior CT, interval surgically excised. Residual malignant tumor in this  area versus postsurgical change in the differential.  + There is very intense metabolic activity at the anus. This could be urologic  however correlation with physical examination suggested to help exclude  possibility of underlying tumor or inflammatory process in this area.  + No finding for FDG avid metastatic disease in the chest.  -- He states he will f/u Dr. Didi Neil and DAWSON for a very intense metabolic activity at the anus found on recent PET. Denied any symptomatic issues. -- At previous visit we have discussed with the patient about systemic therapy with TKI sunitinib. he refused Sunitinib given concerned S/Es. He opted to resume Gleevec at full dose.  He states his cluster headache appeared unpredictable in the past and likely not related to [address]. Today he reported doing well with current Gleevec. Recent labs reviewed with the patient today. Plan:  -- Imatinib 400 mg once daily until disease progression or unacceptable toxicity. Educated patient on adherence on medication . -- Short-course Prednisone will be prescribed if worsening headache occurs. -- F/u PCP to optimize HTN control  -- F/u Tumor profile [facility] report  -- He will f/u Dr. Warden Armenta and  as needed   -- Previously discussed lab monitor closely while on antineoplastic chemotherapy. He states he can only afford labs checked monthly and clinic visit in 2 months and this will continued   -- Advised to contact us if any issues or concerns. -- He will RTC 2 months. Always sooner if required. Cluster headache/ Intractable headache  -- Advised to f/u Neurology for long term control. -- Brain MRI as above  -- Short-course Prednisone will be prescribed if worsening headache      CNIV  -- Zofran PRN      Pulmonary embolus  -- He was diagnosed with one-time pulmonary embolus in May of 2016. Patient was being followed by Dr. Radha Schmidt who retired recently. He states that he is taking Xarelto 20mg PO daily since then. Thrombophilia w/u was negative reportedly. -- 10/12/2020 CTA reported no evidence of pulmonary embolism. -- Xarelto was d/c. Patient was advised to continue on ASA 81 mg for thromboprophylaxis. Polycythemia  -- He is an active smoker. He did not receive his phlebotomy for months for his polycythemia. Negative (wild type) for the JAK2 V617F.   -- His polycythemia was likely secondary 2/2 smoking, COPD. Recent CBC on 8/11/2022  showed improving H/H, 14.5/44. 1. I have counseled him on smoking cessation.   -- Monitor CBC periodically      Orders Placed This Encounter    METABOLIC PANEL, COMPREHENSIVE     Standing Status:   Future     Standing Expiration Date:   11/25/2022    CBC WITH AUTOMATED DIFF     Standing Status:   Future     Standing Expiration Date:   11/25/2022              All of patient's questions answered to their apparent satisfaction. They verbally show understanding and agreement with aforementioned plan. Boyd Fuller MD          Parts of this document has been produced using Dragon dictation system. Unrecognized errors in transcription may be present. Please do not hesitate to reach out for any questions or clarifications.       CC: RADHA Garcia;

## 2022-09-06 DIAGNOSIS — C49.A0 GASTROINTESTINAL STROMAL TUMOR (HCC): ICD-10-CM

## 2022-09-06 DIAGNOSIS — R19.00 ABDOMINAL MASS, UNSPECIFIED ABDOMINAL LOCATION: ICD-10-CM

## 2022-09-06 DIAGNOSIS — C49.A0 GASTROINTESTINAL STROMAL TUMOR (HCC): Primary | ICD-10-CM

## 2022-09-09 ENCOUNTER — OFFICE VISIT (OUTPATIENT)
Dept: CARDIOLOGY CLINIC | Age: 57
End: 2022-09-09
Payer: COMMERCIAL

## 2022-09-09 VITALS
HEART RATE: 63 BPM | OXYGEN SATURATION: 94 % | SYSTOLIC BLOOD PRESSURE: 124 MMHG | WEIGHT: 229 LBS | DIASTOLIC BLOOD PRESSURE: 82 MMHG | BODY MASS INDEX: 32.06 KG/M2 | HEIGHT: 71 IN

## 2022-09-09 DIAGNOSIS — I25.10 CORONARY ARTERY DISEASE INVOLVING NATIVE HEART WITHOUT ANGINA PECTORIS, UNSPECIFIED VESSEL OR LESION TYPE: ICD-10-CM

## 2022-09-09 DIAGNOSIS — E78.5 DYSLIPIDEMIA: ICD-10-CM

## 2022-09-09 DIAGNOSIS — I25.5 ISCHEMIC CARDIOMYOPATHY: Primary | ICD-10-CM

## 2022-09-09 DIAGNOSIS — I10 ESSENTIAL HYPERTENSION WITH GOAL BLOOD PRESSURE LESS THAN 140/90: ICD-10-CM

## 2022-09-09 DIAGNOSIS — Z72.0 TOBACCO ABUSE: ICD-10-CM

## 2022-09-09 DIAGNOSIS — Z95.1 S/P CABG X 3: ICD-10-CM

## 2022-09-09 PROCEDURE — 99214 OFFICE O/P EST MOD 30 MIN: CPT | Performed by: INTERNAL MEDICINE

## 2022-09-09 PROCEDURE — 93000 ELECTROCARDIOGRAM COMPLETE: CPT | Performed by: INTERNAL MEDICINE

## 2022-09-09 NOTE — PROGRESS NOTES
HISTORY OF PRESENT ILLNESS  Jono Gupta is a 59 y.o. male. Follow-up  Associated symptoms include shortness of breath. Pertinent negatives include no chest pain, no abdominal pain and no headaches. Shortness of Breath  Pertinent negatives include no fever, no headaches, no cough, no wheezing, no PND, no orthopnea, no chest pain, no vomiting, no abdominal pain, no rash, no leg swelling and no claudication. Patient presents for a follow-up office visit. He has a past medical history significant for known coronary artery disease with a prior myocardial infarction in the past to his lateral wall with stenting of his obtuse marginal branch. He also had a three-vessel bypass CABG in 2009. His long-standing hypertension, dyslipidemia, COPD with active tobacco use. The patient was [facility] an [facility] pulmonary embolus. He is found to be quite hypoxic at that time. He was started on anticoagulation. He underwent an echocardiogram during his hospital stay which showed a mildly depressed LV systolic function, EF 38-64% with a mildly dilated right ventricle with mildly reduced systolic function, but no obvious pulmonary hypertension. The patient remained on Xarelto for anticoagulation since that time. Patient underwent a pharmacologic nuclear stress test in August 2016 which showed a mild to moderately depressed LV function, EF 40% with a mostly fixed but partially reversible distal posterior lateral perfusion defect likely representing an area of prior infarct with yomi-infarct ischemia. This was not significantly changed compared to his known coronary anatomy, so he has been managed medically. Patient underwent repeat noninvasive cardiac testing in June 2021. His nuclear stress test showed a fixed inferolateral defect, is consistent with prior infarct and ejection fraction in the 42% range.   This was essentially unchanged compared to his stress test from 2016. His echocardiogram showed no significant valvular heart disease with a low normal LVEF. He was last seen in our office 7 months ago. Since last visit, he underwent extensive abdominal surgery in April 2022 to remove additional GIST tumors. He states he had 11 tumors and the surgery took him almost 14 hours. As result he lost about 30 pounds in weight postoperatively. He states he has  regained most of the weight back. He is still smoking cigarettes but trying to cut back. He states he is now down to three quarters to a whole pack a day. This is half is much as he was smoking in the past.  He denies any chest pain or any change in his chronic exertional dyspnea. No new leg swelling, orthopnea or PND.     Past Medical History:   Diagnosis Date    CAD (coronary artery disease)     Carpal tunnel syndrome     COPD     Depression     H/O echocardiogram 11/2017    EF 35-40%, mild LVH, biatrial enlargement, mild TR, RVSP 41    Hemorrhoids     History of esophagitis     History of malignant gastrointestinal stromal tumor (GIST)     Hypercholesterolemia     Hypertension     Myocardial infarction [facility])     Pulmonary embolus [facility]) June 2016    Bilateral    S/P CABG x 3 2009    LIMA to LAD, SVG to RPDA, SVG to diagonal    S/P cardiac catheterization November 2011    Patent GALLAGHER to LAD, patent SVG to diet, and occluded SVG to RPDA,  native LAD 85% proximal stenosis, left circumflex 20% diffuse disease, RCA distal 70% stenosis,, EF 45%    Secondary polycythemia       Current Outpatient Medications   Medication Sig Dispense Refill    imatinib (Gleevec) 400 mg tablet Take one tab by mouth daily with a large glass of water 30 Tablet 3    ramipriL (ALTACE) 10 mg capsule take 1 capsule by mouth once daily 90 Capsule 3    hydroCHLOROthiazide (HYDRODIURIL) 25 mg tablet take 1 tablet by mouth once daily 30 Tablet 6    metoprolol tartrate (LOPRESSOR) 25 mg tablet take 1 tablet by mouth twice a day 180 Tablet 3    aspirin (ASPIRIN) 325 mg tablet Take 325 mg by mouth daily. traZODone (DESYREL) 150 mg tablet Take 150 mg by mouth nightly. No Known Allergies     Social History     Tobacco Use    Smoking status: Every Day     Packs/day: 1.50     Types: Cigarettes    Smokeless tobacco: Never   Vaping Use    Vaping Use: Never used   Substance Use Topics    Alcohol use: Yes     Comment: \"I haven't drank in 2 months, but I was drinking about 12 pk beer per week\"     Drug use: No       History reviewed. No pertinent family history. Review of Systems   Constitutional:  Negative for chills, fever and weight loss. HENT:  Negative for nosebleeds. Eyes:  Negative for blurred vision and double vision. Respiratory:  Positive for shortness of breath. Negative for cough and wheezing. Cardiovascular:  Negative for chest pain, palpitations, orthopnea, claudication, leg swelling and PND. Gastrointestinal:  Negative for abdominal pain, heartburn, nausea and vomiting. Genitourinary:  Negative for dysuria and hematuria. Musculoskeletal:  Negative for falls and myalgias. Skin:  Negative for rash. Neurological:  Negative for dizziness, focal weakness and headaches. Endo/Heme/Allergies:  Does not bruise/bleed easily. Psychiatric/Behavioral:  Negative for substance abuse. Visit Vitals  /82 (BP 1 Location: Left upper arm, BP Patient Position: Sitting, BP Cuff Size: Large adult)   Pulse 63   Ht 5' 11\" (1.803 m)   Wt 103.9 kg (229 lb)   SpO2 94%   BMI 31.94 kg/m²      Physical Exam  Constitutional:       Appearance: He is well-developed. HENT:      Head: Normocephalic and atraumatic. Eyes:      Conjunctiva/sclera: Conjunctivae normal.   Neck:      Vascular: No carotid bruit or JVD. Cardiovascular:      Rate and Rhythm: Normal rate and regular rhythm. Pulses: Normal pulses. Heart sounds: S1 normal and S2 normal. No murmur heard. No gallop. No S3 sounds.    Pulmonary:      Breath sounds: Decreased air movement present. No decreased breath sounds, wheezing, rhonchi or rales. Abdominal:      General: Bowel sounds are normal. There is no distension. Palpations: Abdomen is soft. There is no mass. Tenderness: There is no abdominal tenderness. Musculoskeletal:         General: No tenderness. Cervical back: Neck supple. Skin:     General: Skin is warm and dry. Neurological:      Mental Status: He is alert and oriented to person, place, and time. Psychiatric:         Behavior: Behavior normal.     EKG: Normal sinus rhythm, normal axis,  inferior Q waves,  normal QTc interval, no ST segment changes concerning for ischemia. Compared to the previous EKG, no significant change. ASSESSMENT and PLAN    Coronary artery disease. Status post three-vessel CABG in 2009. LIMA to LAD, SVG to diagonal SVG to RPDA. The SVG to PDA is known to be occluded on repeat cardiac catheterization in 2011. His native RCA has a 70% distal stenosis which has been managed medically. Patient last underwent a repeat pharmacologic nuclear stress test in June 2021, which showed an ejection fraction of 42 % and a primarily fixed posterior lateral defect consistent with his known coronary anatomy. Patient reports that he did not have typical anginal symptoms in the past.   He remains on a full dose aspirin, beta-blocker, but has been intolerant to multiple statins in the past.  He can continue his current medical regimen. Ischemic cardiomyopathy. EF 42 to 50% on recent noninvasive cardiac testing. This has been fairly stable over the past 4 to 5 years. No evidence of decompensated heart failure. He remains on a beta-blocker and an ACE inhibitor, both of which I would continue. He is never required any loop diuretics. I have recommended repeat an echocardiogram at the same time of his next follow-up visit to reevaluate his LV function. Hypertension.   Patient blood pressure is reasonably well-controlled on his current medical regimen. All of which I would continue. Dyslipidemia. Patient did not tolerate simvastatin or Crestor. He may be a candidate for Repatha in the future. At this point he is not interested in starting any additional medications. Tobacco use disorder. Patient has cut back on his smoking. He is now smoking between 15 and 20 cigarettes/day which is half is much as he was in the past.  He was congratulated encouraged to consider complete smoking cessation. GIST tumor. Patient continues to take oral chemotherapy. This is followed closely by his oncologist.  He underwent extensive abdominal surgery in April 2022 for recurrent tumors. Followup in 6 months, sooner if needed.

## 2022-09-09 NOTE — PROGRESS NOTES
Sarika Barriga presents today for   Chief Complaint   Patient presents with    Follow-up     6 month       Sarika Barriga preferred language for health care discussion is english/other. Is someone accompanying this pt? no    Is the patient using any DME equipment during [address]? no    Depression Screening:  3 most recent PHQ Screens 9/9/2022   Little interest or pleasure in doing things Not at all   Feeling down, depressed, irritable, or hopeless Not at all   Total Score PHQ 2 0       Learning Assessment:  Learning Assessment 9/9/2022   PRIMARY LEARNER Patient   PRIMARY LANGUAGE ENGLISH   LEARNER PREFERENCE PRIMARY DEMONSTRATION   ANSWERED BY patient   RELATIONSHIP SELF       Abuse Screening:  Abuse Screening Questionnaire 9/9/2022   Do you ever feel afraid of your partner? N   Are you in a relationship with someone who physically or mentally threatens you? N   Is it safe for you to go home? Y       Fall Risk  Fall Risk Assessment, last 12 mths 2/1/2021   Able to walk? Yes   Fall in past 12 months? 0   Do you feel unsteady? 0   Are you worried about falling 0           Pt currently taking Anticoagulant therapy? no    Pt currently taking Antiplatelet therapy ? Aspirin 325 mg daily      Coordination of Care:  1. Have you been to the ER, urgent care clinic since your last visit? Hospitalized since your last visit? no    2. Have you seen or consulted any other health care providers outside of the [address] [place] [address] since your last visit? Include any pap smears or colon screening.  no

## 2022-09-16 ENCOUNTER — TELEPHONE (OUTPATIENT)
Dept: ONCOLOGY | Age: 57
End: 2022-09-16

## 2022-09-16 NOTE — TELEPHONE ENCOUNTER
Contacted . García Villatoro to follow up on his care since he has been taking Gleevec. He reports he has not been having any headaches thankfully but he had diarrhea for 2 days recently. And he patient reports he has been having constant nausea but no vomiting. I asked him if he would like us to send in some antinausea or diarrhea medication into the pharmacy. He reported those medication gives him a headache an he will not take them. Advised him to contact the office if he changes his mind and if he needs anything else we are here.

## 2022-09-20 ENCOUNTER — HOSPITAL ENCOUNTER (OUTPATIENT)
Dept: CT IMAGING | Age: 57
Discharge: HOME | End: 2022-09-20
Attending: INTERNAL MEDICINE
Payer: COMMERCIAL

## 2022-09-20 LAB — CREAT UR-MCNC: 0.9 MG/DL (ref 0.6–1.3)

## 2022-09-20 PROCEDURE — 74177 CT ABD & PELVIS W/CONTRAST: CPT

## 2022-09-20 PROCEDURE — 82565 ASSAY OF CREATININE: CPT

## 2022-09-20 PROCEDURE — 74011000636 HC RX REV CODE- 636: Performed by: INTERNAL MEDICINE

## 2022-09-20 RX ADMIN — IOPAMIDOL 100 ML: 612 INJECTION, SOLUTION INTRAVENOUS at 09:14

## 2022-09-25 DIAGNOSIS — R19.00 ABDOMINAL MASS, UNSPECIFIED ABDOMINAL LOCATION: ICD-10-CM

## 2022-09-25 DIAGNOSIS — C49.A0 GASTROINTESTINAL STROMAL TUMOR (HCC): ICD-10-CM

## 2022-10-12 ENCOUNTER — APPOINTMENT (OUTPATIENT)
Dept: ONCOLOGY | Age: 57
End: 2022-10-12

## 2022-10-13 LAB
A-G RATIO,AGRAT: 1.9 RATIO (ref 1.1–2.6)
ABSOLUTE LYMPHOCYTE COUNT, 10803: 1.7 K/UL (ref 1–4.8)
ALBUMIN SERPL-MCNC: 4.2 G/DL (ref 3.5–5)
ALP SERPL-CCNC: 46 U/L (ref 25–115)
ALT SERPL-CCNC: 13 U/L (ref 5–40)
ANION GAP SERPL CALC-SCNC: 10 MMOL/L (ref 3–15)
AST SERPL W P-5'-P-CCNC: 17 U/L (ref 10–37)
BASOPHILS # BLD: 0.1 K/UL (ref 0–0.2)
BASOPHILS NFR BLD: 2 % (ref 0–2)
BILIRUB SERPL-MCNC: 0.2 MG/DL (ref 0.2–1.2)
BUN SERPL-MCNC: 14 MG/DL (ref 6–22)
CALCIUM SERPL-MCNC: 9.2 MG/DL (ref 8.4–10.5)
CHLORIDE SERPL-SCNC: 97 MMOL/L (ref 98–110)
CO2 SERPL-SCNC: 28 MMOL/L (ref 20–32)
CREAT SERPL-MCNC: 0.9 MG/DL (ref 0.5–1.2)
EOSINOPHIL # BLD: 0.8 K/UL (ref 0–0.5)
EOSINOPHIL NFR BLD: 12 % (ref 0–6)
ERYTHROCYTE [DISTWIDTH] IN BLOOD BY AUTOMATED COUNT: 17.5 % (ref 10–15.5)
GLOBULIN,GLOB: 2.2 G/DL (ref 2–4)
GLOMERULAR FILTRATION RATE: >60 ML/MIN/1.73 SQ.M.
GLUCOSE SERPL-MCNC: 104 MG/DL (ref 70–99)
GRANULOCYTES,GRANS: 52 % (ref 40–75)
HCT VFR BLD AUTO: 42.5 % (ref 39.3–51.6)
HGB BLD-MCNC: 14.3 G/DL (ref 13.1–17.2)
LYMPHOCYTES, LYMLT: 26 % (ref 20–45)
MCH RBC QN AUTO: 34 PG (ref 26–34)
MCHC RBC AUTO-ENTMCNC: 34 G/DL (ref 31–36)
MCV RBC AUTO: 101 FL (ref 80–95)
MONOCYTES # BLD: 0.6 K/UL (ref 0.1–1)
MONOCYTES NFR BLD: 8 % (ref 3–12)
NEUTROPHILS # BLD AUTO: 3.6 K/UL (ref 1.8–7.7)
PLATELET # BLD AUTO: 212 K/UL (ref 140–440)
PMV BLD AUTO: 10.3 FL (ref 9–13)
POTASSIUM SERPL-SCNC: 4.4 MMOL/L (ref 3.5–5.5)
PROT SERPL-MCNC: 6.4 G/DL (ref 6.4–8.3)
RBC # BLD AUTO: 4.23 M/UL (ref 3.8–5.8)
SODIUM SERPL-SCNC: 135 MMOL/L (ref 133–145)
WBC # BLD AUTO: 6.8 K/UL (ref 4–11)

## 2022-10-26 ENCOUNTER — OFFICE VISIT (OUTPATIENT)
Dept: ONCOLOGY | Age: 57
End: 2022-10-26
Payer: COMMERCIAL

## 2022-10-26 VITALS
SYSTOLIC BLOOD PRESSURE: 133 MMHG | HEIGHT: 71 IN | WEIGHT: 236.2 LBS | HEART RATE: 58 BPM | RESPIRATION RATE: 18 BRPM | DIASTOLIC BLOOD PRESSURE: 86 MMHG | OXYGEN SATURATION: 100 % | BODY MASS INDEX: 33.07 KG/M2

## 2022-10-26 DIAGNOSIS — Z79.899 ON ANTINEOPLASTIC CHEMOTHERAPY: ICD-10-CM

## 2022-10-26 DIAGNOSIS — Z72.0 TOBACCO USE: ICD-10-CM

## 2022-10-26 DIAGNOSIS — R19.00 ABDOMINAL MASS, UNSPECIFIED ABDOMINAL LOCATION: ICD-10-CM

## 2022-10-26 DIAGNOSIS — D75.1 POLYCYTHEMIA: ICD-10-CM

## 2022-10-26 DIAGNOSIS — Z51.11 ENCOUNTER FOR ANTINEOPLASTIC CHEMOTHERAPY: ICD-10-CM

## 2022-10-26 DIAGNOSIS — C49.A0 GASTROINTESTINAL STROMAL TUMOR (HCC): Primary | ICD-10-CM

## 2022-10-26 PROCEDURE — 3078F DIAST BP <80 MM HG: CPT | Performed by: INTERNAL MEDICINE

## 2022-10-26 PROCEDURE — 99214 OFFICE O/P EST MOD 30 MIN: CPT | Performed by: INTERNAL MEDICINE

## 2022-10-26 PROCEDURE — 3074F SYST BP LT 130 MM HG: CPT | Performed by: INTERNAL MEDICINE

## 2022-10-26 NOTE — PROGRESS NOTES
Hematology/Oncology  Progress Note    Name: Leah Ochoa  Date: 10/26/2022  : 1965    RADHA Luna     Mr. Demar Arellano is a 59y.o. year old male who was seen for recurrent GIST. Subjective:     Mr. Demar Arellano is a 25-year-old man who was diagnosed with a pulmonary embolus in May of 2016. Patient was being followed by Dr. Iza Jarrett who retired. He states that he is taking Xarelto 20mg PO daily. He admits smoking 1 ppd from 1 /2ppd and states he is willing to cut back some more until he totally quits. He went to abdominal wall biopsy recently which confirmed recurrent GIST. He has hx of GIST in . He reported he was taking Gleevec for about one year after surgery at that time. He has started [facility] since 2021. Today he denied significant pain or vomiting or altered bowel movements. He is an active smoker. He did not receive his phlebotomy for months for his polycythemia. Today he reported doing stable with Gleevec. He denied fever, chills, night sweat, unintentional weight loss, skin lumps or bumps, acute bleeding or bruising issues. No acute bleeding, blood in stool, dark stool, melena, hematochezia, hemoptysis, dark urine, or easily bruising. Denied headache, acute vision change, dizziness, chest pain, shortness of breath, palpitation, productive cough, vomiting, abdominal pain, altered bowel habits, dysuria, new bone pain or back pain, focal numbness or weakness. Past medical history, family history, and social history: these were reviewed and remains unchanged.     Past Medical History:   Diagnosis Date    CAD (coronary artery disease)     Carpal tunnel syndrome     COPD     Depression     H/O echocardiogram 2017    EF 35-40%, mild LVH, biatrial enlargement, mild TR, RVSP 41    Hemorrhoids     History of esophagitis     History of malignant gastrointestinal stromal tumor (GIST)     Hypercholesterolemia     Hypertension     Myocardial infarction [facility])     [facility] June 2016    Bilateral    S/P CABG x 3 2009    LIMA to LAD, SVG to RPDA, SVG to diagonal    S/P cardiac catheterization November 2011    Patent GALLAGHER to LAD, patent SVG to diet, and occluded SVG to RPDA,  native LAD 85% proximal stenosis, left circumflex 20% diffuse disease, RCA distal 70% stenosis,, EF 45%    Secondary polycythemia      Past Surgical History:   Procedure Laterality Date    HX CORONARY STENT PLACEMENT      HX CYST REMOVAL      HX GI      tumor near stomach    HX HEENT      deviated septum    HX HEMORRHOIDECTOMY      HX ORTHOPAEDIC      knee injury right    HX OTHER SURGICAL      resection of mesenteric mass    MA CARDIAC SURG PROCEDURE UNLIST      stent     Social History     Socioeconomic History    Marital status:      Spouse name: Not on file    Number of children: Not on file    Years of education: Not on file    Highest education level: Not on file   Occupational History    Not on file   Tobacco Use    Smoking status: Every Day     Packs/day: 1.50     Types: Cigarettes    Smokeless tobacco: Never   Vaping Use    Vaping Use: Never used   Substance and Sexual Activity    Alcohol use: Yes     Comment: \"I haven't drank in 2 months, but I was drinking about 12 pk beer per week\"     Drug use: No    Sexual activity: Not on file   Other Topics Concern    Not on file   Social History Narrative    Not on file     Social Determinants of Health     Financial Resource Strain: Not on file   Food Insecurity: Not on file   Transportation Needs: Not on file   Physical Activity: Not on file   Stress: Not on file   Social Connections: Not on file   Intimate Partner Violence: Not on file   Housing Stability: Not on file     History reviewed. No pertinent family history.   Current Outpatient Medications   Medication Sig Dispense Refill    imatinib (Gleevec) 400 mg tablet Take one tab by mouth daily with a large glass of water 30 Tablet 3    ramipriL (ALTACE) 10 mg capsule take 1 capsule by mouth once daily 90 Capsule 3    hydroCHLOROthiazide (HYDRODIURIL) 25 mg tablet take 1 tablet by mouth once daily 30 Tablet 6    metoprolol tartrate (LOPRESSOR) 25 mg tablet take 1 tablet by mouth twice a day 180 Tablet 3    aspirin (ASPIRIN) 325 mg tablet Take 325 mg by mouth daily. traZODone (DESYREL) 150 mg tablet Take 150 mg by mouth nightly. Review of Systems   Constitutional:  Negative for chills, diaphoresis, fever, malaise/fatigue and weight loss. Respiratory:  Negative for cough, hemoptysis, shortness of breath and wheezing. Cardiovascular:  Negative for chest pain, palpitations and leg swelling. Gastrointestinal:  Negative for abdominal pain, diarrhea, heartburn, nausea and vomiting. Genitourinary:  Negative for dysuria, frequency, hematuria and urgency. Musculoskeletal:  Negative for joint pain and myalgias. Skin:  Negative for itching and rash. Neurological:  Negative for dizziness, seizures, weakness and headaches. Psychiatric/Behavioral:  Negative for depression. The patient does not have insomnia. Objective:     Visit Vitals  /86   Pulse (!) 58   Resp 18   Ht 5' 11\" (1.803 m)   Wt 107.1 kg (236 lb 3.2 oz)   SpO2 100%   BMI 32.94 kg/m²         ECOG Performance Status (grade): 0  0 - able to carry on all pre-disease activity w/out restriction  1 - restricted but able to carry out light work  2 - ambulatory and can self- care but unable to carry out work  3 - bed or chair >50% of waking hours  4 - completely disable, total care, confined to bed or chair    Physical Exam  Constitutional:       General: He is not in acute distress. HENT:      Head: Normocephalic and atraumatic. Eyes:      Pupils: Pupils are equal, round, and reactive to light. Cardiovascular:      Pulses: Normal pulses. Heart sounds: Normal heart sounds. No murmur heard. Pulmonary:      Effort: Pulmonary effort is normal. No respiratory distress. Breath sounds: Normal breath sounds. Abdominal:      General: Bowel sounds are normal. There is no distension. Palpations: Abdomen is soft. There is no mass. Tenderness: There is no abdominal tenderness. There is no guarding. Musculoskeletal:         General: No swelling or tenderness. Cervical back: Neck supple. No rigidity. Lymphadenopathy:      Cervical: No cervical adenopathy. Skin:     General: Skin is warm. Findings: No rash. Neurological:      Mental Status: He is alert and oriented to person, place, and time. Mental status is at baseline. Cranial Nerves: No cranial nerve deficit. Psychiatric:         Mood and Affect: Mood normal.        Diagnostics:      No results found for this or any previous visit (from the past 96 hour(s)). Imaging:  No results found for this or any previous visit. Results for orders placed during the hospital encounter of 05/31/16    XR SWALLOW FUNC VIDEO    Narrative  Modified barium swallow with video recording:        INDICATION:    Dysphagia. The study has been performed under supervision of speech therapist.    The patient swallowed thin liquid but there is been deep penetration almost to  the level of vocal cord. Then patient swallowed thin liquid food with small sips  and with chin-tucked position. At  this time, there has been no penetration or  aspiration of thin liquid into larynx. Then patient swallowed barium tablet. There has  been penetration of thin liquid used for swallowing tablet into larynx. There  is been no evidence of stagnation of tablet in hypopharynx or in upper  esophagus. Patient swallowed puree and cracker normally without penetration or  nasopharyngeal into larynx. Fluoroscopy time utilized is 48 seconds. No fluoroscopy images is obtained. Impression  : At first there is penetration of thin liquid into larynx as described.  But when  the patient swallowed thin liquid in small sips with chin tuck position, there  is been no penetration or aspiration into larynx. Patient swallowed barium tablet but there has  been penetration of the liquid  used with a tablet. Swallowing of puree and cracker has been normal.      Results for orders placed in visit on 09/06/22    CT CHEST ABD PELV W CONT    Narrative  CT CHEST ABDOMEN AND PELVIS WITH CONTRAST        COMPARISON:  CT/CT June 2022 and earlier studies . INDICATIONS:    Gastrointestinal stromal tumor, metastatic. Following the uneventful administration of  oral contrast and  100 cc of Isovue  300 scanning of the chest, abdomen and pelvis is performed with a multislice  scanner. Coronal sagittal and axial reconstructions were created from the 3 D  data set. FINDINGS:    CT CHEST FINDINGS:    Thyroid/Base Of Neck: Normal.    Lungs:  Clear. .  Pleural Spaces:  Unremarkable. Chest Wall:  No breast mass is evident. No axillary lymphadenopathy is evident. Mediastinum, Kell, Great Vessels, Heart:  Unremarkable. CT ABDOMEN FINDINGS:    Liver/Biliary: Small hepatic cysts unchanged. No new lesions evident. Unremarkable biliary tree    Spleen: Normal.    Adrenal Glands: Small low-attenuation nodules unchanged. Kidneys: Normal.    Pancreas: Normal.    Stomach, Small Bowel, appendix, and Colon: Normal.    There is no significant adenopathy. The abdominal aorta is unremarkable. The IVC is unremarkable. Peritoneal Spaces: There is no free fluid or free air. A soft tissue mass  posterior to the left rectus muscle inferiorly on axial image 136 is decreased  from 3.8 x 2.0 cm to 3.6 x 1.5 cm. Abdominal Wall: A left abdominal wall mass on axial image 121 previously  measured 2.9 x 3.9 cm, now essentially unchanged at 3 x 4.1 cm. This measured  2.4 x 3.6 cm in January. The marked interval decrease in size of other lesions  following January was not evident in this lesion. . Thickening of the anterior  abdominal wound/scarring show some subtle residual nodularity and areas of  previous lobular mass lesions. Bladder: Unremarkable. Osseous Structures Of The Chest, Abdomen And Pelvis: Unremarkable for age. Impression  The marked interval improvement which was apparent on June 2022 has persistent. There is been slight further decrease in size of the persistent nodule posterior  to the left rectus muscle. The majority of the previous multifocal lesions are  barely discernible. A left anterior abdominal nodule shows differing behavior  than the remainder of the disease-slightly increased from January and not  significantly changed from June. Significance of this behavior is uncertain. Overall the evidence of response to therapy evident in June is preserved    . All CT scans at this facility are performed using dose optimization technique as  appropriate to the performed exam, to include automated exposure control,  adjustment of the mA and/or kV according to patient's size (Including  appropriate matching for site-specific examinations), or use of iterative  reconstruction technique. PATHOLOGY  12/3/2020 ANTERIOR ABDOMINAL WALL MASS, BIOPSY:   GASTROINTESTINAL STROMAL TUMOR. GASTROINTESTINAL STROMAL TUMOR (GIST): Biopsy   SPECIMEN   Procedure: Core needle biopsy   TUMOR   Tumor Site: Anterior abdominal wall   Histologic Type: Gastrointestinal stromal tumor, spindle cell type   Histologic Grade: G1: Low grade; mitotic rate <= 5 / 5 mm2   Mitotic Rate: 1 mitoses per 5 mm2   Necrosis: Not identified   Risk Assessment: Cannot be determined: Recurrent   Treatment Effect: No known prebiopsy therapy     DIAGNOSIS COMMENT:   Given the patient's history of gastrointestinal stromal tumor, the findings are consistent with recurrent disease.     4/5/2022  Surgical Pathology Report                         Case: [id number]                                   Authorizing Provider:  Jono Barron MD      Collected:           04/05/2022 9722               Ordering Location: [facility]    Received:            04/06/2022 [address] [facility]                                                     Pathologist:           Bennett Prader, MD                                                             Specimens:   A) - Epigastrium, Subcutaneous epigastric tumor in 10% Formalin                                      B) - Abdomen, Subcutaneous mass mid-abdomen in 10% Formalin                                          C) - Abdomen, Subcutaneous mass mid-abdomen #2 in 10% Formalin                                      D) - Abdomen, Intra abdominal mass mid-abdomen in 10% Formalin                                      E) - Abdomen, Intra abdominal mass mid-abdomen #2 in 10% Formalin                                    F) - Abdomen, Intra abdominal mass mid-abdomen #3 in 10% Formalin                                    G) - Abdomen, Intra abdominal mass mid-abdomen #4 in 10% Formalin                                    H) - Abdomen, Intra abdominal mass mid-abdomen #5 in 10% Formalin                                    I) - Tissue, Pericolonic mass in 10% Formalin                                                        J) - Suprapubic, suprapubic mass in 10% formalin                                                    K) - Suprapubic, suprapubic nodule mass in 10% formalin                                              L) - Abdomen, intra abdominal mass in 10% formalin                                                  M) - Suprapubic, suprapubic mass in #2 in 10% formalin                                              N) - Suprapubic, suprapubic mass #3 in 10% formalin                                                  O) - Sigmoid, Sigmoid nodule in 10% formalin                                                        P) - Nodule, left lower quadrant nodule in 10% formalin.                                             Q) - Tissue, left peritoneal nodule in 10% formalin. R) - Abdominal Wall, abdominal wall mass in 10% formalin. S) - Colon, colon mass in 10% formalin. T) - Mesentery, mesenteric mass in 10% formalin. U) - Omentum, omental nodule in 10% formalin. V) - Mesentery, mesenteric nodule in 10% formalin. W) - Tissue, right upper quadrant mass in 10% formalin. X) - Diaphragm, Right diaphragm mass in 10% formalin. Y) - Small Bowel, Small bowel                                                                        Z) - Diaphragm, Left Diaphragm                                                                      AA) - Nodule, subcutaneous nodule in 10% formalin. Diagnosis     A.  SUBCUTANEOUS EPIGASTRIC TUMOR, EXCISION:          -  METASTATIC GASTROINTESTINAL STROMAL TUMOR (GIST), 2.9 CM. B.  SUBCUTANEOUS MASS, MID ABDOMEN, EXCISION:          -  METASTATIC GASTROINTESTINAL STROMAL TUMOR (GIST), 5.9 CM WITH HEMORRHAGE AND THE TUMOR NECROSIS. C.  SUBCUTANEOUS MASS, MID ABDOMEN #2, EXCISION:          -  METASTATIC GASTROINTESTINAL STROMAL TUMOR (GIST), 7.5 CM. D.  INTRAABDOMINAL MASS, MID ABDOMEN, EXCISION:          -  METASTATIC GASTROINTESTINAL STROMAL TUMOR (GIST), 9.2 CM WITH FOCAL HEMORRHAGE AND NECROSIS. E.  INTRA-ABDOMINAL MASS, MID ABDOMEN, #2, EXCISION:          -  METASTATIC GASTROINTESTINAL STROMAL TUMOR (GIST), 3.7 CM WITH HEMORRHAGE. F.  INTRA-ABDOMINAL MASS, MID ABDOMEN #3, EXCISION:           -  METASTATIC GASTROINTESTINAL STROMAL TUMOR (GIST), 4.3 CM WITH TUMOR NECROSIS AND HEMORRHAGE.      Angela Bowling MASS, MID ABDOMEN #4, EXCISION:           -  FOCI OF METASTATIC GASTROINTESTINAL STROMAL TUMOR (GIST) , 1.7 AND 7.5 CM, FOCALLY HEMORRHAGIC AND NECROTIC. H.  INTRA-ABDOMINAL MASS, MID ABDOMEN #5, EXCISION:            -  METASTATIC GASTROINTESTINAL STROMAL TUMOR (GIST), 8.5 CM WITH HEMORRHAGE AND SEVERAL MICROSCOPIC TUMOR FOCI ADJACENT TO THE TUMOR MASS. I.   PERICOLONIC MASS, EXCISION:            - METASTATIC GASTROINTESTINAL STROMAL TUMOR (GIST), 4.7 CM. J.  SUPRAPUBIC MASS, EXCISION:            - METASTATIC GASTROINTESTINAL STROMAL TUMOR (GIST), 7.8 CM WITH CYSTIC CHANGE.     K.  SUPRAPUBIC NODULE MASS, EXCISION:            - METASTATIC GASTROINTESTINAL STROMAL TUMOR (GIST), 5.5 CM WITH CYSTIC CHANGE.     L.  INTRAABDOMINAL MASS, EXCISION:           -  METASTATIC GASTROINTESTINAL STROMAL TUMOR (GIST), 17 CM CM WITH TUMOR NECROSIS AND HEMORRHAGE. Gemma Sneddon MASS #2, EXCISION:           -  METASTATIC GASTROINTESTINAL STROMAL TUMOR (GIST), 7.5 CM AT THE END. Becki Magyar #3, EXCISION:           -  METASTATIC GASTROINTESTINAL STROMAL TUMOR (GIST), 6.7 CM AT THE END. O.  SIGMOID NODULE, EXCISION:                 -  METASTATIC GASTROINTESTINAL STROMAL TUMOR (GIST), 2,9 CM WITH MARKED NECROSIS/HYALINIZATION OF TUMOR. P.   LEFT LOWER QUADRANT NODULE, EXCISION:            -  METASTATIC GASTROINTESTINAL STROMAL TUMOR (GIST), 11.3 CM WITH TUMOR NECROSIS AND HEMORRHAGE. Q.  LEFT PERITONEAL NODULE, EXCISION:            -  METASTATIC GASTROINTESTINAL STROMAL TUMOR (GIST), 2.7 CM. R.  ABDOMINAL WALL MASS, EXCISION:            -  METASTATIC GASTROINTESTINAL STROMAL TUMOR (GIST), 5.5 CM WITH CYSTIC CHANGE AND HEMORRHAGE. S.  COLON MASS, EXCISION:            -  METASTATIC GASTROINTESTINAL STROMAL TUMOR (GIST), TWO FOCI, ONE MEASURE 0.5 CM  AND THE OTHER 0.1 CM.       T.   MESENTERIC MASS, EXCISION:             -  METASTATIC GASTROINTESTINAL STROMAL TUMOR (GIST), UP TO 9.7 CM WITH EXTENSIVE TUMOR NECROSIS, HEMORRHAGE AND FOCAL CYSTIC CHANGE. U.   OMENTAL NODULE, EXCISION:             -  METASTATIC GASTROINTESTINAL STROMAL TUMOR (GIST), UP TO 5.0 CM WITH EXTENSIVE TUMOR NECROSIS, HEMORRHAGE AND FOCAL CYSTIC CHANGE.     V.  MESENTERIC NODULE, EXCISION:             -  METASTATIC GASTROINTESTINAL STROMAL TUMOR (GIST), 0.4 CM. W.  RIGHT UPPER QUADRANT MASS, EXCISION:             -  METASTATIC GASTROINTESTINAL STROMAL TUMOR (GIST), UP TO 4.7 CM WITH MARKEDLY HEMORRHAGE AND CYSTIC CHANGE. X.    RIGHT DIAPHRAGM MASS, EXCISION:             -  METASTATIC GASTROINTESTINAL STROMAL TUMOR (GIST), UP TO 3.5 CM WITH EXTENSIVE TUMOR NECROSIS, HEMORRHAGE AND FOCAL CYSTIC CHANGE. Y.  SMALL BOWEL. RESECTION:            -  SEGMENT OF SMALL INTESTINE WITH FOCAL ADHESION AND SEROSITIS, NO TUMOR SEEN. Z.  LEFT DIAPHRAGM, EXCISION:            -  METASTATIC GASTROINTESTINAL STROMAL TUMOR (GIST), UP TO 1 CM. AA.  SUBCUTANEOUS NODULE, EXCISION:              -  METASTATIC GASTROINTESTINAL STROMAL TUMOR (GIST), UP TO 3.7 CM WITH FOCAL CYSTIC CHANGE.         (RP:ao)     NOTE:  Original GIST biopsy/resection was done at an outside hospital and the primary site is small intestine per clinical history. The morphological features and clinical presentation of this tumor are those of a malignant GIST. Electronic Signature: Eagle Longo MD, PhD               Assessment:     1. Gastrointestinal stromal tumor ([facility] [address]    2. Abdominal mass, unspecified abdominal location    3. On antineoplastic chemotherapy    4. Encounter for antineoplastic chemotherapy    5. Polycythemia    6. Tobacco use          Plan:     Recurrent GIST  Abdominal wall mass  History of GIST 2010:  -- History of GIST, Dx 2010. S.p adjuvant Imatinib for about 1 year reportedly.    -- 9/18/2020 CT reported extensive abdominal and pelvic mesenteric masses with some masses directly extending and penetrating through the anterior abdominal wall reflective of the given history of bulge. There are some regions of benign herniation but the vast majority is malignant. Findings may be primary and/or metastatic and within the spectrum of carcinomatosis. Left adrenal nodule new since prior exam. Metastasis is not excluded. Stable right adrenal nodule. Stable hepatic hypodensities. -- 12/3/2020 Abdominal wall biopsy confirmed recurrent GIST.  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 Due to granular background staining, interpretation of PDL 1 immunohistochemical stain is not possible. -- He has seen Dr. Mohan Vasquez for initial evaluation of resection. I have discussed with Dr. Katya Duarte who suggested to start neoadjuvant TKIs prior to surgical resection. He was not interested in XRT at this point. 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 -- Since 2/16/2021 he was started Imatinib at 400 mg daily. He has tolerated therapy without high graded toxicities. He reported his tumors became smaller with therapy. -- 4/7/2021 CT C/A/P reported multiple abdominal and pelvic peritoneal masses mostly show mildly decrease in size and much decreased peripheral enhancing components since the prior CT, suggesting partial response to the treatment. No new lesion seen. -- Since his last visit on 5/26/2021, the patient did not keep follow up visit with our clinic. The patient reported he took [address] as 7 days on/3 days off (due to worsening headache) prior to his surgical resection. He stated she stopped Gleevec about 2 months before surgery.  He was off Xarelto since started [address]   -- 4/5/2022 s/p exploratory laparotomy, small bowel resection, bladder repair, left ureterolysis, and excision of multiple abdominal and subcutaneous tissue tumors. + Post-operatively his paz was left in following his case since he underwent a bladder repair. The patient had a severe lung disease/ hypoxia-related symptoms that improves with oxygen therapy.  + Pathology report as above, multiple tumor deposits. -- 6/9/2022 Brain MRI reported no finding for metastatic disease.  -- 6/9/2022 Restaging PET reported residual hypermetabolic metastatic deposits to include the anterior left pelvis omentum and left abdominal wall.  + Hypermetabolic soft tissue left retroperitoneum between the psoas muscle and common iliac chain. May represent residual metastatic tumor deposit however the ureter is in this area and could be a potential cause for false positive.  + There is soft tissue thickening with less intense but moderately increased metabolic activity in the midline at site of surgical incision and metastatic mass on prior CT, interval surgically excised. Residual malignant tumor in this area versus postsurgical change in the differential.  + There is very intense metabolic activity at the anus. This could be urologic however correlation with physical examination suggested to help exclude possibility of underlying tumor or inflammatory process in this area.  + No finding for FDG avid metastatic disease in the chest.  -- He states he will f/u Dr. Matteo Mcdowell for a very intense metabolic activity at the anus found on recent PET. Denied any symptomatic issues. -- At previous visit we have discussed with the patient about systemic therapy with TKI sunitinib. he refused Sunitinib given concerned S/Es. He opted to resume Gleevec at full dose. He states his cluster headache appeared unpredictable in the past and likely not related to [address]. -- Tumor profile [facility] report review (in media)  -- Today he reported doing well with current Gleevec. Recent labs and CT reviewed with the patient today.    9/20/2022 CT reported: the marked interval improvement which was apparent on June 2022 has persistent. There is been slight further decrease in size of the persistent nodule posterior  to the left rectus muscle. The majority of the previous multifocal lesions are barely discernible. A left anterior abdominal nodule shows differing behavior than the remainder of the disease-slightly increased from January and not significantly changed from June. Significance of this behavior is uncertain. Overall the evidence of response to therapy evident in June is preserved    Plan:  -- He will continue Imatinib 400 mg once daily until disease progression or unacceptable toxicity. Educated patient the importance of medical adherence and compliance. -- Lab check CBC, CMP  -- He will f/u Dr. Pramod Kearns and  as needed   -- Advised to contact us if any issues or concerns. -- He will RTC 2 months. Always sooner if required. Cluster headache/ Intractable headache  -- Advised to f/u Neurology for long term control. -- Brain MRI as above  -- Short-course Prednisone will be prescribed if worsening headache  -- Denied any worsening issues at this time. CNIV  -- Zofran PRN      Pulmonary embolus  -- He was diagnosed with one-time pulmonary embolus in May of 2016. Patient was being followed by Dr. Mike Fried who retired recently. He states that he is taking Xarelto 20mg PO daily since then. Thrombophilia w/u was negative reportedly. -- 10/12/2020 CTA reported no evidence of pulmonary embolism. -- Xarelto was d/c. Patient was advised to continue on ASA 81 mg for thromboprophylaxis. Polycythemia  -- He is an active smoker. He did not receive his phlebotomy for months for his polycythemia. Negative (wild type) for the JAK2 V617F.   -- His polycythemia was likely secondary 2/2 smoking, COPD. Recent CBC on 8/11/2022  showed improving H/H, 14.5/44. 1. I have counseled him on smoking cessation. -- Monitor CBC periodically      No orders of the defined types were placed in this encounter. All of patient's questions answered to their apparent satisfaction. They verbally show understanding and agreement with aforementioned plan. Cresencio Xiong MD          Parts of this document has been produced using Dragon dictation system. Unrecognized errors in transcription may be present. Please do not hesitate to reach out for any questions or clarifications.       CC: RADHA Luevano;

## 2022-10-27 DIAGNOSIS — C49.A0 GASTROINTESTINAL STROMAL TUMOR (HCC): ICD-10-CM

## 2022-10-31 RX ORDER — IMATINIB MESYLATE 400 MG/1
TABLET, FILM COATED ORAL
Qty: 30 TABLET | Refills: 3 | Status: SHIPPED | OUTPATIENT
Start: 2022-10-31

## 2022-12-21 ENCOUNTER — LAB ONLY (OUTPATIENT)
Dept: ONCOLOGY | Age: 57
End: 2022-12-21

## 2022-12-21 ENCOUNTER — APPOINTMENT (OUTPATIENT)
Dept: ONCOLOGY | Age: 57
End: 2022-12-21

## 2022-12-21 DIAGNOSIS — D75.1 POLYCYTHEMIA: ICD-10-CM

## 2022-12-21 DIAGNOSIS — C49.A0 GASTROINTESTINAL STROMAL TUMOR (HCC): Primary | ICD-10-CM

## 2022-12-21 DIAGNOSIS — C49.A0 GASTROINTESTINAL STROMAL TUMOR (HCC): ICD-10-CM

## 2022-12-22 LAB
A-G RATIO,AGRAT: 1.8 RATIO (ref 1.1–2.6)
ABSOLUTE LYMPHOCYTE COUNT, 10803: 1.6 K/UL (ref 1–4.8)
ALBUMIN SERPL-MCNC: 4.1 G/DL (ref 3.5–5)
ALP SERPL-CCNC: 46 U/L (ref 25–115)
ALT SERPL-CCNC: 14 U/L (ref 5–40)
ANION GAP SERPL CALC-SCNC: 10 MMOL/L (ref 3–15)
AST SERPL W P-5'-P-CCNC: 20 U/L (ref 10–37)
BASOPHILS # BLD: 0.1 K/UL (ref 0–0.2)
BASOPHILS NFR BLD: 1 % (ref 0–2)
BILIRUB SERPL-MCNC: 0.3 MG/DL (ref 0.2–1.2)
BUN SERPL-MCNC: 7 MG/DL (ref 6–22)
CALCIUM SERPL-MCNC: 9.3 MG/DL (ref 8.4–10.5)
CHLORIDE SERPL-SCNC: 96 MMOL/L (ref 98–110)
CO2 SERPL-SCNC: 28 MMOL/L (ref 20–32)
CREAT SERPL-MCNC: 0.9 MG/DL (ref 0.5–1.2)
EOSINOPHIL # BLD: 0.5 K/UL (ref 0–0.5)
EOSINOPHIL NFR BLD: 6 % (ref 0–6)
ERYTHROCYTE [DISTWIDTH] IN BLOOD BY AUTOMATED COUNT: 13.4 % (ref 10–15.5)
GLOBULIN,GLOB: 2.3 G/DL (ref 2–4)
GLOMERULAR FILTRATION RATE: >60 ML/MIN/1.73 SQ.M.
GLUCOSE SERPL-MCNC: 100 MG/DL (ref 70–99)
GRANULOCYTES,GRANS: 62 % (ref 40–75)
HCT VFR BLD AUTO: 47.2 % (ref 39.3–51.6)
HGB BLD-MCNC: 15.7 G/DL (ref 13.1–17.2)
LYMPHOCYTES, LYMLT: 22 % (ref 20–45)
MCH RBC QN AUTO: 36 PG (ref 26–34)
MCHC RBC AUTO-ENTMCNC: 33 G/DL (ref 31–36)
MCV RBC AUTO: 107 FL (ref 80–95)
MONOCYTES # BLD: 0.6 K/UL (ref 0.1–1)
MONOCYTES NFR BLD: 8 % (ref 3–12)
NEUTROPHILS # BLD AUTO: 4.6 K/UL (ref 1.8–7.7)
PLATELET # BLD AUTO: 209 K/UL (ref 140–440)
PMV BLD AUTO: 11.1 FL (ref 9–13)
POTASSIUM SERPL-SCNC: 4.4 MMOL/L (ref 3.5–5.5)
PROT SERPL-MCNC: 6.4 G/DL (ref 6.4–8.3)
RBC # BLD AUTO: 4.4 M/UL (ref 3.8–5.8)
SODIUM SERPL-SCNC: 134 MMOL/L (ref 133–145)
WBC # BLD AUTO: 7.3 K/UL (ref 4–11)

## 2023-01-04 ENCOUNTER — OFFICE VISIT (OUTPATIENT)
Dept: ONCOLOGY | Age: 58
End: 2023-01-04
Payer: COMMERCIAL

## 2023-01-04 VITALS
HEART RATE: 56 BPM | SYSTOLIC BLOOD PRESSURE: 125 MMHG | RESPIRATION RATE: 18 BRPM | DIASTOLIC BLOOD PRESSURE: 87 MMHG | HEIGHT: 71 IN | WEIGHT: 235.4 LBS | OXYGEN SATURATION: 95 % | BODY MASS INDEX: 32.96 KG/M2

## 2023-01-04 DIAGNOSIS — Z79.899 ON ANTINEOPLASTIC CHEMOTHERAPY: ICD-10-CM

## 2023-01-04 DIAGNOSIS — D75.1 POLYCYTHEMIA: ICD-10-CM

## 2023-01-04 DIAGNOSIS — R19.00 ABDOMINAL MASS, UNSPECIFIED ABDOMINAL LOCATION: ICD-10-CM

## 2023-01-04 DIAGNOSIS — C49.A0 GASTROINTESTINAL STROMAL TUMOR (HCC): Primary | ICD-10-CM

## 2023-01-04 DIAGNOSIS — Z51.11 ENCOUNTER FOR ANTINEOPLASTIC CHEMOTHERAPY: ICD-10-CM

## 2023-01-04 DIAGNOSIS — Z72.0 TOBACCO USE: ICD-10-CM

## 2023-01-04 DIAGNOSIS — C49.A0 GASTROINTESTINAL STROMAL TUMOR (HCC): ICD-10-CM

## 2023-01-06 RX ORDER — HYDROCHLOROTHIAZIDE 25 MG/1
TABLET ORAL
Qty: 30 TABLET | Refills: 6 | Status: SHIPPED | OUTPATIENT
Start: 2023-01-06

## 2023-01-23 DIAGNOSIS — D75.1 POLYCYTHEMIA: ICD-10-CM

## 2023-01-23 DIAGNOSIS — C49.A0 GASTROINTESTINAL STROMAL TUMOR (HCC): Primary | ICD-10-CM

## 2023-01-23 RX ORDER — PREDNISONE 10 MG/1
10 TABLET ORAL
Qty: 7 TABLET | Refills: 0 | Status: SHIPPED | OUTPATIENT
Start: 2023-01-23

## 2023-01-30 ENCOUNTER — HOSPITAL ENCOUNTER (OUTPATIENT)
Dept: CT IMAGING | Age: 58
Discharge: HOME | End: 2023-01-30
Attending: INTERNAL MEDICINE
Payer: COMMERCIAL

## 2023-01-30 LAB — CREAT UR-MCNC: 0.9 MG/DL (ref 0.6–1.3)

## 2023-01-30 PROCEDURE — 74177 CT ABD & PELVIS W/CONTRAST: CPT

## 2023-01-30 PROCEDURE — 74011000636 HC RX REV CODE- 636: Performed by: INTERNAL MEDICINE

## 2023-01-30 PROCEDURE — 82565 ASSAY OF CREATININE: CPT

## 2023-01-30 RX ADMIN — IOPAMIDOL 100 ML: 612 INJECTION, SOLUTION INTRAVENOUS at 12:24

## 2023-02-06 RX ORDER — METOPROLOL TARTRATE 25 MG/1
TABLET, FILM COATED ORAL
Qty: 180 TABLET | Refills: 3 | Status: SHIPPED | OUTPATIENT
Start: 2023-02-06

## 2023-02-13 DIAGNOSIS — I25.5 ISCHEMIC CARDIOMYOPATHY: Primary | ICD-10-CM

## 2023-02-22 RX ORDER — IMATINIB MESYLATE 400 MG/1
TABLET ORAL
Qty: 30 TABLET | Refills: 3 | Status: ACTIVE | OUTPATIENT
Start: 2023-02-22

## 2023-02-27 ENCOUNTER — NURSE ONLY (OUTPATIENT)
Age: 58
End: 2023-02-27

## 2023-02-27 DIAGNOSIS — D75.1 SECONDARY POLYCYTHEMIA: ICD-10-CM

## 2023-02-27 DIAGNOSIS — C49.A0 GASTROINTESTINAL STROMAL TUMOR, UNSPECIFIED SITE (HCC): Primary | ICD-10-CM

## 2023-02-28 LAB
A/G RATIO: 1.7 RATIO (ref 1.1–2.6)
ALBUMIN SERPL-MCNC: 4 G/DL (ref 3.5–5)
ALP BLD-CCNC: 45 U/L (ref 25–115)
ALT SERPL-CCNC: 14 U/L (ref 5–40)
ANION GAP SERPL CALCULATED.3IONS-SCNC: 10 MMOL/L (ref 3–15)
AST SERPL-CCNC: 20 U/L (ref 10–37)
BASOPHILS # BLD: 2 % (ref 0–2)
BASOPHILS ABSOLUTE: 0.1 K/UL (ref 0–0.2)
BILIRUB SERPL-MCNC: 0.2 MG/DL (ref 0.2–1.2)
BUN BLDV-MCNC: 10 MG/DL (ref 6–22)
CALCIUM SERPL-MCNC: 8.8 MG/DL (ref 8.4–10.5)
CHLORIDE BLD-SCNC: 96 MMOL/L (ref 98–110)
CO2: 28 MMOL/L (ref 20–32)
CREAT SERPL-MCNC: 0.8 MG/DL (ref 0.5–1.2)
EOSINOPHIL # BLD: 8 % (ref 0–6)
EOSINOPHILS ABSOLUTE: 0.6 K/UL (ref 0–0.5)
GLOBULIN: 2.3 G/DL (ref 2–4)
GLOMERULAR FILTRATION RATE: >60 ML/MIN/1.73 SQ.M.
GLUCOSE: 116 MG/DL (ref 70–99)
HCT VFR BLD CALC: 45.1 % (ref 39.3–51.6)
HEMOGLOBIN: 15.1 G/DL (ref 13.1–17.2)
LYMPHOCYTES # BLD: 23 % (ref 20–45)
LYMPHOCYTES ABSOLUTE: 1.8 K/UL (ref 1–4.8)
MCH RBC QN AUTO: 36 PG (ref 26–34)
MCHC RBC AUTO-ENTMCNC: 34 G/DL (ref 31–36)
MCV RBC AUTO: 106 FL (ref 80–95)
MONOCYTES ABSOLUTE: 0.5 K/UL (ref 0.1–1)
MONOCYTES: 6 % (ref 3–12)
NEUTROPHILS ABSOLUTE: 4.9 K/UL (ref 1.8–7.7)
NEUTROPHILS: 61 % (ref 40–75)
PDW BLD-RTO: 14 % (ref 10–15.5)
PLATELET # BLD: 217 K/UL (ref 140–440)
PMV BLD AUTO: 10.7 FL (ref 9–13)
POTASSIUM SERPL-SCNC: 3.9 MMOL/L (ref 3.5–5.5)
RBC: 4.24 M/UL (ref 3.8–5.8)
SODIUM BLD-SCNC: 134 MMOL/L (ref 133–145)
TOTAL PROTEIN: 6.3 G/DL (ref 6.4–8.3)
WBC: 8 K/UL (ref 4–11)

## 2023-03-06 ENCOUNTER — OFFICE VISIT (OUTPATIENT)
Age: 58
End: 2023-03-06

## 2023-03-06 VITALS
RESPIRATION RATE: 16 BRPM | BODY MASS INDEX: 32.62 KG/M2 | HEART RATE: 63 BPM | OXYGEN SATURATION: 98 % | WEIGHT: 233 LBS | DIASTOLIC BLOOD PRESSURE: 90 MMHG | SYSTOLIC BLOOD PRESSURE: 125 MMHG | HEIGHT: 71 IN

## 2023-03-06 DIAGNOSIS — C49.A0 GASTROINTESTINAL STROMAL TUMOR, UNSPECIFIED SITE (HCC): Primary | ICD-10-CM

## 2023-03-06 DIAGNOSIS — G44.021 CHRONIC CLUSTER HEADACHE, INTRACTABLE: ICD-10-CM

## 2023-03-06 DIAGNOSIS — R19.00 INTRA-ABDOMINAL AND PELVIC SWELLING, MASS AND LUMP, UNSPECIFIED SITE: ICD-10-CM

## 2023-03-06 DIAGNOSIS — I26.99 OTHER PULMONARY EMBOLISM WITHOUT ACUTE COR PULMONALE, UNSPECIFIED CHRONICITY (HCC): ICD-10-CM

## 2023-03-06 DIAGNOSIS — Z51.11 ENCOUNTER FOR ANTINEOPLASTIC CHEMOTHERAPY: ICD-10-CM

## 2023-03-06 DIAGNOSIS — D75.1 SECONDARY POLYCYTHEMIA: ICD-10-CM

## 2023-03-06 RX ORDER — PREDNISONE 10 MG/1
TABLET ORAL
Qty: 14 TABLET | Refills: 0 | Status: SHIPPED | OUTPATIENT
Start: 2023-03-06

## 2023-03-06 RX ORDER — PREDNISONE 10 MG/1
TABLET ORAL
COMMUNITY
Start: 2023-01-23 | End: 2023-03-06 | Stop reason: SDUPTHER

## 2023-03-06 NOTE — PROGRESS NOTES
Hematology/Oncology  Progress Note    Name: Flores Stewart  Date: 3/6/23   : 1965    RIVERA Storey     Mr. Charmayne Song is a 59 y.o. male who was seen for recurrent GIST. Subjective:     Mr. Charmayne Song is a 59 y.o. man who was diagnosed with a pulmonary embolus in May of 2016. Patient was being followed by Dr. Nicholas Perez who retired. He states that he is taking Xarelto 20mg PO daily. He admits smoking 1 ppd from 1 /2ppd and states he is willing to cut back some more until he totally quits. He went to abdominal wall biopsy recently which confirmed recurrent GIST. He has hx of GIST in . He reported he was taking Gleevec for about one year after surgery at that time. He has started [facility] since 2021. Today he denied significant pain or vomiting or altered bowel movements. He is an active smoker. He did not receive his phlebotomy for months for his polycythemia. Today he reported doing stable with Gleevec. He admitted he stopped taking Gleevec for about 10 days during his recent COVID infections, then resumed it. Otherwise he states he is taking Gleevec as advised. He reported his abdominal pain has significantly improved. He denied fever, chills, night sweat, unintentional weight loss, skin lumps or bumps, acute bleeding or bruising issues. Denied headache, acute vision change, dizziness, chest pain, shortness of breath, palpitation, productive cough, vomiting, abdominal pain, altered bowel habits, dysuria, new bone pain or back pain, focal numbness or weakness. Past medical history, family history, and social history: these were reviewed and remains unchanged.     Past Medical History:   Diagnosis Date    CAD (coronary artery disease)     Carpal tunnel syndrome     COPD     Depression     H/O echocardiogram 2017    EF 35-40%, mild LVH, biatrial enlargement, mild TR, RVSP 41    Hemorrhoids     History of esophagitis     History of malignant gastrointestinal stromal tumor (GIST) Hypercholesterolemia     Hypertension     Myocardial infarction [facility])     Pulmonary embolus [facility]) June 2016    Bilateral    S/P CABG x 3 2009    LIMA to LAD, SVG to RPDA, SVG to diagonal    S/P cardiac catheterization November 2011    Patent COURTNEY to LAD, patent SVG to diet, and occluded SVG to RPDA,  native LAD 85% proximal stenosis, left circumflex 20% diffuse disease, RCA distal 70% stenosis,, EF 45%    Secondary polycythemia      Past Surgical History:   Procedure Laterality Date    HX CORONARY STENT PLACEMENT      HX CYST REMOVAL      HX GI      tumor near stomach    HX HEENT      deviated septum    HX HEMORRHOIDECTOMY      HX ORTHOPAEDIC      knee injury right    HX OTHER SURGICAL      resection of mesenteric mass    MS UNLISTED PROCEDURE CARDIAC SURGERY      stent     Social History     Socioeconomic History    Marital status:      Spouse name: Not on file    Number of children: Not on file    Years of education: Not on file    Highest education level: Not on file   Occupational History    Not on file   Tobacco Use    Smoking status: Every Day     Packs/day: 1.50     Types: Cigarettes    Smokeless tobacco: Never   Vaping Use    Vaping Use: Never used   Substance and Sexual Activity    Alcohol use: Yes     Comment: \"I haven't drank in 2 months, but I was drinking about 12 pk beer per week\"     Drug use: No    Sexual activity: Not on file   Other Topics Concern    Not on file   Social History Narrative    Not on file     Social Determinants of Health     Financial Resource Strain: Not on file   Food Insecurity: Not on file   Transportation Needs: Not on file   Physical Activity: Not on file   Stress: Not on file   Social Connections: Not on file   Intimate Partner Violence: Not on file   Housing Stability: Not on file     History reviewed. No pertinent family history.   Current Outpatient Medications   Medication Sig Dispense Refill    imatinib (Gleevec) 400 mg tablet Take one tab by mouth daily with a large glass of water 30 Tablet 3    ramipriL (ALTACE) 10 mg capsule take 1 capsule by mouth once daily 90 Capsule 3    hydroCHLOROthiazide (HYDRODIURIL) 25 mg tablet take 1 tablet by mouth once daily 30 Tablet 6    metoprolol tartrate (LOPRESSOR) 25 mg tablet take 1 tablet by mouth twice a day 180 Tablet 3    aspirin (ASPIRIN) 325 mg tablet Take 325 mg by mouth daily.      traZODone (DESYREL) 150 mg tablet Take 150 mg by mouth nightly.         Review of Systems   Constitutional:  Negative for chills, diaphoresis, fever, malaise/fatigue and weight loss.   Respiratory:  Negative for cough, hemoptysis, shortness of breath and wheezing.    Cardiovascular:  Negative for chest pain, palpitations and leg swelling.   Gastrointestinal:  Negative for abdominal pain, diarrhea, heartburn, nausea and vomiting.   Genitourinary:  Negative for dysuria, frequency, hematuria and urgency.   Musculoskeletal:  Negative for joint pain and myalgias.   Skin:  Negative for itching and rash.   Neurological:  Negative for dizziness, seizures, weakness and headaches.   Psychiatric/Behavioral:  Negative for depression. The patient does not have insomnia.           Objective:     Visit Vitals  BP (!) 125/90   Pulse 63   Resp 16   Ht 5' 11\" (1.803 m)   Wt 233 lb (105.7 kg)   SpO2 98%   BMI 32.50 kg/m²        ECOG Performance Status (grade): 0  0 - able to carry on all pre-disease activity w/out restriction  1 - restricted but able to carry out light work  2 - ambulatory and can self- care but unable to carry out work  3 - bed or chair >50% of waking hours  4 - completely disable, total care, confined to bed or chair    Physical Exam  Constitutional:       General: He is not in acute distress.  HENT:      Head: Normocephalic and atraumatic.   Eyes:      Pupils: Pupils are equal, round, and reactive to light.   Cardiovascular:      Pulses: Normal pulses.      Heart sounds: Normal heart sounds. No murmur heard.  Pulmonary:      Effort:  Pulmonary effort is normal. No respiratory distress. Breath sounds: Normal breath sounds. Abdominal:      General: Bowel sounds are normal. There is no distension. Palpations: Abdomen is soft. There is no mass. Tenderness: There is no abdominal tenderness. There is no guarding. Musculoskeletal:         General: No swelling or tenderness. Cervical back: Neck supple. No rigidity. Lymphadenopathy:      Cervical: No cervical adenopathy. Skin:     General: Skin is warm. Findings: No rash. Neurological:      Mental Status: He is alert and oriented to person, place, and time. Mental status is at baseline. Cranial Nerves: No cranial nerve deficit. Psychiatric:         Mood and Affect: Mood normal.        Diagnostics:      No results found for this or any previous visit (from the past 96 hour(s)). Imaging:  No results found for this or any previous visit. Results for orders placed during the hospital encounter of 05/31/16    XR SWALLOW FUNC VIDEO    Narrative  Modified barium swallow with video recording:        INDICATION:    Dysphagia. The study has been performed under supervision of speech therapist.    The patient swallowed thin liquid but there is been deep penetration almost to  the level of vocal cord. Then patient swallowed thin liquid food with small sips  and with chin-tucked position. At  this time, there has been no penetration or  aspiration of thin liquid into larynx. Then patient swallowed barium tablet. There has  been penetration of thin liquid used for swallowing tablet into larynx. There  is been no evidence of stagnation of tablet in hypopharynx or in upper  esophagus. Patient swallowed puree and cracker normally without penetration or  nasopharyngeal into larynx. Fluoroscopy time utilized is 48 seconds. No fluoroscopy images is obtained. Impression  : At first there is penetration of thin liquid into larynx as described.  But when  the patient swallowed thin liquid in small sips with chin tuck position, there  is been no penetration or aspiration into larynx. Patient swallowed barium tablet but there has  been penetration of the liquid  used with a tablet. Swallowing of puree and cracker has been normal.      Results for orders placed in visit on 09/06/22    CT CHEST ABD PELV W CONT    Narrative  CT CHEST ABDOMEN AND PELVIS WITH CONTRAST        COMPARISON:  CT/CT June 2022 and earlier studies . INDICATIONS:    Gastrointestinal stromal tumor, metastatic. Following the uneventful administration of  oral contrast and  100 cc of Isovue  300 scanning of the chest, abdomen and pelvis is performed with a multislice  scanner. Coronal sagittal and axial reconstructions were created from the 3 D  data set. FINDINGS:    CT CHEST FINDINGS:    Thyroid/Base Of Neck: Normal.    Lungs:  Clear. .  Pleural Spaces:  Unremarkable. Chest Wall:  No breast mass is evident. No axillary lymphadenopathy is evident. Mediastinum, [place], Great Vessels, Heart:  Unremarkable. CT ABDOMEN FINDINGS:    Liver/Biliary: Small hepatic cysts unchanged. No new lesions evident. Unremarkable biliary tree    Spleen: Normal.    Adrenal Glands: Small low-attenuation nodules unchanged. Kidneys: Normal.    Pancreas: Normal.    Stomach, Small Bowel, appendix, and Colon: Normal.    There is no significant adenopathy. The abdominal aorta is unremarkable. The IVC is unremarkable. Peritoneal Spaces: There is no free fluid or free air. A soft tissue mass  posterior to the left rectus muscle inferiorly on axial image 136 is decreased  from 3.8 x 2.0 cm to 3.6 x 1.5 cm. Abdominal Wall: A left abdominal wall mass on axial image 121 previously  measured 2.9 x 3.9 cm, now essentially unchanged at 3 x 4.1 cm. This measured  2.4 x 3.6 cm in January. The marked interval decrease in size of other lesions  following January was not evident in this lesion. Brain Bateman Thickening of the anterior  abdominal wound/scarring show some subtle residual nodularity and areas of  previous lobular mass lesions. Bladder: Unremarkable. Osseous Structures Of The Chest, Abdomen And Pelvis: Unremarkable for age. Impression  The marked interval improvement which was apparent on June 2022 has persistent. There is been slight further decrease in size of the persistent nodule posterior  to the left rectus muscle. The majority of the previous multifocal lesions are  barely discernible. A left anterior abdominal nodule shows differing behavior  than the remainder of the disease-slightly increased from January and not  significantly changed from June. Significance of this behavior is uncertain. Overall the evidence of response to therapy evident in June is preserved    . All CT scans at this facility are performed using dose optimization technique as  appropriate to the performed exam, to include automated exposure control,  adjustment of the mA and/or kV according to patient's size (Including  appropriate matching for site-specific examinations), or use of iterative  reconstruction technique. PATHOLOGY  12/3/2020 ANTERIOR ABDOMINAL WALL MASS, BIOPSY:   GASTROINTESTINAL STROMAL TUMOR. GASTROINTESTINAL STROMAL TUMOR (GIST): Biopsy   SPECIMEN   Procedure: Core needle biopsy   TUMOR   Tumor Site: Anterior abdominal wall   Histologic Type: Gastrointestinal stromal tumor, spindle cell type   Histologic Grade: G1: Low grade; mitotic rate <= 5 / 5 mm2   Mitotic Rate: 1 mitoses per 5 mm2   Necrosis: Not identified   Risk Assessment: Cannot be determined: Recurrent   Treatment Effect: No known prebiopsy therapy     DIAGNOSIS COMMENT:   Given the patient's history of gastrointestinal stromal tumor, the findings are consistent with recurrent disease.     4/5/2022  Surgical Pathology Report                         Case: [id number]                                   Authorizing Provider: Nasim Amezcua MD      Collected:           04/05/2022 1564               Ordering Location:     [facility]    Received:            04/06/2022 [address] [facility]                                                     Pathologist:           Ac Garcia MD                                                             Specimens:   A) - Epigastrium, Subcutaneous epigastric tumor in 10% Formalin                                      B) - Abdomen, Subcutaneous mass mid-abdomen in 10% Formalin                                          C) - Abdomen, Subcutaneous mass mid-abdomen #2 in 10% Formalin                                      D) - Abdomen, Intra abdominal mass mid-abdomen in 10% Formalin                                      E) - Abdomen, Intra abdominal mass mid-abdomen #2 in 10% Formalin                                    F) - Abdomen, Intra abdominal mass mid-abdomen #3 in 10% Formalin                                    G) - Abdomen, Intra abdominal mass mid-abdomen #4 in 10% Formalin                                    H) - Abdomen, Intra abdominal mass mid-abdomen #5 in 10% Formalin                                    I) - Tissue, Pericolonic mass in 10% Formalin                                                        J) - Suprapubic, suprapubic mass in 10% formalin                                                    K) - Suprapubic, suprapubic nodule mass in 10% formalin                                              L) - Abdomen, intra abdominal mass in 10% formalin                                                  M) - Suprapubic, suprapubic mass in #2 in 10% formalin                                              N) - Suprapubic, suprapubic mass #3 in 10% formalin                                                  O) - Sigmoid, Sigmoid nodule in 10% formalin                                                        P) - Nodule, left lower quadrant nodule in 10% formalin. Q) - Tissue, left peritoneal nodule in 10% formalin. R) - Abdominal Wall, abdominal wall mass in 10% formalin. S) - Colon, colon mass in 10% formalin. T) - Mesentery, mesenteric mass in 10% formalin. U) - Omentum, omental nodule in 10% formalin. V) - Mesentery, mesenteric nodule in 10% formalin. W) - Tissue, right upper quadrant mass in 10% formalin. X) - Diaphragm, Right diaphragm mass in 10% formalin. Y) - Small Bowel, Small bowel                                                                        Z) - Diaphragm, Left Diaphragm                                                                      AA) - Nodule, subcutaneous nodule in 10% formalin. Diagnosis     A.  SUBCUTANEOUS EPIGASTRIC TUMOR, EXCISION:          -  METASTATIC GASTROINTESTINAL STROMAL TUMOR (GIST), 2.9 CM. B.  SUBCUTANEOUS MASS, MID ABDOMEN, EXCISION:          -  METASTATIC GASTROINTESTINAL STROMAL TUMOR (GIST), 5.9 CM WITH HEMORRHAGE AND THE TUMOR NECROSIS. C.  SUBCUTANEOUS MASS, MID ABDOMEN #2, EXCISION:          -  METASTATIC GASTROINTESTINAL STROMAL TUMOR (GIST), 7.5 CM. D.  INTRAABDOMINAL MASS, MID ABDOMEN, EXCISION:          -  METASTATIC GASTROINTESTINAL STROMAL TUMOR (GIST), 9.2 CM WITH FOCAL HEMORRHAGE AND NECROSIS. E.  INTRA-ABDOMINAL MASS, MID ABDOMEN, #2, EXCISION:          -  METASTATIC GASTROINTESTINAL STROMAL TUMOR (GIST), 3.7 CM WITH HEMORRHAGE.      F.  INTRA-ABDOMINAL MASS, MID ABDOMEN #3, EXCISION:           -  METASTATIC GASTROINTESTINAL STROMAL TUMOR (GIST), 4.3 CM WITH TUMOR NECROSIS AND HEMORRHAGE. G.  INTRA-ABDOMINAL MASS, MID ABDOMEN #4, EXCISION:           -  FOCI OF METASTATIC GASTROINTESTINAL STROMAL TUMOR (GIST) , 1.7 AND 7.5 CM, FOCALLY HEMORRHAGIC AND NECROTIC. H.  INTRA-ABDOMINAL MASS, MID ABDOMEN #5, EXCISION:            -  METASTATIC GASTROINTESTINAL STROMAL TUMOR (GIST), 8.5 CM WITH HEMORRHAGE AND SEVERAL MICROSCOPIC TUMOR FOCI ADJACENT TO THE TUMOR MASS. I.   PERICOLONIC MASS, EXCISION:            - METASTATIC GASTROINTESTINAL STROMAL TUMOR (GIST), 4.7 CM. J.  SUPRAPUBIC MASS, EXCISION:            - METASTATIC GASTROINTESTINAL STROMAL TUMOR (GIST), 7.8 CM WITH CYSTIC CHANGE.     K.  SUPRAPUBIC NODULE MASS, EXCISION:            - METASTATIC GASTROINTESTINAL STROMAL TUMOR (GIST), 5.5 CM WITH CYSTIC CHANGE.     L.  INTRAABDOMINAL MASS, EXCISION:           -  METASTATIC GASTROINTESTINAL STROMAL TUMOR (GIST), 17 CM CM WITH TUMOR NECROSIS AND HEMORRHAGE. Wolfgang Mcadams MASS #2, EXCISION:           -  METASTATIC GASTROINTESTINAL STROMAL TUMOR (GIST), 7.5 CM AT THE END. Geanie Maggie #3, EXCISION:           -  METASTATIC GASTROINTESTINAL STROMAL TUMOR (GIST), 6.7 CM AT THE END. O.  SIGMOID NODULE, EXCISION:                 -  METASTATIC GASTROINTESTINAL STROMAL TUMOR (GIST), 2,9 CM WITH MARKED NECROSIS/HYALINIZATION OF TUMOR. P.   LEFT LOWER QUADRANT NODULE, EXCISION:            -  METASTATIC GASTROINTESTINAL STROMAL TUMOR (GIST), 11.3 CM WITH TUMOR NECROSIS AND HEMORRHAGE. Q.  LEFT PERITONEAL NODULE, EXCISION:            -  METASTATIC GASTROINTESTINAL STROMAL TUMOR (GIST), 2.7 CM. R.  ABDOMINAL WALL MASS, EXCISION:            -  METASTATIC GASTROINTESTINAL STROMAL TUMOR (GIST), 5.5 CM WITH CYSTIC CHANGE AND HEMORRHAGE. S.  COLON MASS, EXCISION:            -  METASTATIC GASTROINTESTINAL STROMAL TUMOR (GIST), TWO FOCI, ONE MEASURE 0.5 CM  AND THE OTHER 0.1 CM.       T. MESENTERIC MASS, EXCISION:             -  METASTATIC GASTROINTESTINAL STROMAL TUMOR (GIST), UP TO 9.7 CM WITH EXTENSIVE TUMOR NECROSIS, HEMORRHAGE AND FOCAL CYSTIC CHANGE. U.   OMENTAL NODULE, EXCISION:             -  METASTATIC GASTROINTESTINAL STROMAL TUMOR (GIST), UP TO 5.0 CM WITH EXTENSIVE TUMOR NECROSIS, HEMORRHAGE AND FOCAL CYSTIC CHANGE.     V.  MESENTERIC NODULE, EXCISION:             -  METASTATIC GASTROINTESTINAL STROMAL TUMOR (GIST), 0.4 CM. W.  RIGHT UPPER QUADRANT MASS, EXCISION:             -  METASTATIC GASTROINTESTINAL STROMAL TUMOR (GIST), UP TO 4.7 CM WITH MARKEDLY HEMORRHAGE AND CYSTIC CHANGE. X.    RIGHT DIAPHRAGM MASS, EXCISION:             -  METASTATIC GASTROINTESTINAL STROMAL TUMOR (GIST), UP TO 3.5 CM WITH EXTENSIVE TUMOR NECROSIS, HEMORRHAGE AND FOCAL CYSTIC CHANGE. Y.  SMALL BOWEL. RESECTION:            -  SEGMENT OF SMALL INTESTINE WITH FOCAL ADHESION AND SEROSITIS, NO TUMOR SEEN. Z.  LEFT DIAPHRAGM, EXCISION:            -  METASTATIC GASTROINTESTINAL STROMAL TUMOR (GIST), UP TO 1 CM. AA.  SUBCUTANEOUS NODULE, EXCISION:              -  METASTATIC GASTROINTESTINAL STROMAL TUMOR (GIST), UP TO 3.7 CM WITH FOCAL CYSTIC CHANGE.         (RP:aoc)     NOTE:  Original GIST biopsy/resection was done at an outside hospital and the primary site is small intestine per clinical history. The morphological features and clinical presentation of this tumor are those of a malignant GIST. Electronic Signature: Stephy Lindsey MD, PhD               Diagnosis:     1. Gastrointestinal stromal tumor, unspecified site ([facility] [address]    2. Intra-abdominal and pelvic swelling, mass and lump, unspecified site    3. Chronic cluster headache, intractable    4. Encounter for antineoplastic chemotherapy    5. Secondary polycythemia    6.  Other pulmonary embolism without acute cor pulmonale, unspecified chronicity (HCC)         Assessment and Plan:     Recurrent GIST  Abdominal wall mass  History of GIST 2010:  -- History of GIST, Dx 2010. S.p adjuvant Imatinib for about 1 year reportedly. -- 9/18/2020 CT reported extensive abdominal and pelvic mesenteric masses with some masses directly extending and penetrating through the anterior abdominal wall reflective of the given history of bulge. There are some regions of benign herniation but the vast majority is malignant. Findings may be primary and/or metastatic and within the spectrum of carcinomatosis. Left adrenal nodule new since prior exam. Metastasis is not excluded. Stable right adrenal nodule. Stable hepatic hypodensities. -- 12/3/2020 Abdominal wall biopsy confirmed recurrent GIST.  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 Due to granular background staining, interpretation of PDL 1 immunohistochemical stain is not possible. -- He has seen Dr. Vibha Kessler for initial evaluation of resection. I have discussed with Dr. Arvin Valles who suggested to start neoadjuvant TKIs prior to surgical resection. He was not interested in XRT at this point. 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 -- Since 2/16/2021 he was started Imatinib at 400 mg daily. He has tolerated therapy without high graded toxicities. He reported his tumors became smaller with therapy. -- 4/7/2021 CT C/A/P reported multiple abdominal and pelvic peritoneal masses mostly show mildly decrease in size and much decreased peripheral enhancing components since the prior CT, suggesting partial response to the treatment. No new lesion seen. -- Since his last visit on 5/26/2021, the patient did not keep follow up visit with our clinic. The patient reported he took [address] as 7 days on/3 days off (due to worsening headache) prior to his surgical resection.  He stated she stopped Gleevec about 2 months before surgery. He was off Xarelto since started [address].   -- 4/5/2022 s/p exploratory laparotomy, small bowel resection, bladder repair, left ureterolysis, and excision of multiple abdominal and subcutaneous tissue tumors. + Post-operatively his spaulding was left in following his case since he underwent a bladder repair. The patient had a severe lung disease/ hypoxia-related symptoms that improves with oxygen therapy.  + Pathology report as above, multiple tumor deposits. -- 6/9/2022 Brain MRI reported no finding for metastatic disease.  -- 6/9/2022 Restaging PET reported residual hypermetabolic metastatic deposits to include the anterior left pelvis omentum and left abdominal wall.  + Hypermetabolic soft tissue left retroperitoneum between the psoas muscle and common iliac chain. May represent residual metastatic tumor deposit however the ureter is in this area and could be a potential cause for false positive.  + There is soft tissue thickening with less intense but moderately increased metabolic activity in the midline at site of surgical incision and metastatic mass on prior CT, interval surgically excised. Residual malignant tumor in this area versus postsurgical change in the differential.  + There is very intense metabolic activity at the anus. This could be urologic however correlation with physical examination suggested to help exclude possibility of underlying tumor or inflammatory process in this area.  + No finding for FDG avid metastatic disease in the chest.  -- He states he will f/u Dr. Leonardo Levi and [facility] for a very intense metabolic activity at the anus found on recent PET. Denied any symptomatic issues. -- At previous visit we have discussed with the patient about systemic therapy with TKI sunitinib. he refused Sunitinib given concerned S/Es. He opted to resume Gleevec at full dose.  He states his cluster headache appeared unpredictable in the past and likely not related to Gleevec. -- Tumor profile Martin report review (in media)  -- 9/20/2022 CT reported: the marked interval improvement which was apparent on June 2022 has persistent. There is been slight further decrease in size of the persistent nodule posterior  to the left rectus muscle. The majority of the previous multifocal lesions are barely discernible. A left anterior abdominal nodule shows differing behavior than the remainder of the disease-slightly increased from January and not significantly changed from June. Significance of this behavior is uncertain. Overall the evidence of response to therapy evident in June is preserved. -- He reported his abdominal pain has significantly improved. He still fu Surgery for hernia. -- Today he reported doing stable with Gleevec. He admitted he stopped taking Gleevec for about 10 days during his recent COVID infections, then resumed it. Otherwise he states he is taking Gleevec as advised. Recent labs and CT findings reviewed with the patient today. 1/30/2023 CT CAP reported stable diease. The soft tissue nodule in the left anterior lower abdominal wall is mildly smaller. The nodule in the left anterior pelvis beneath the rectus sheath is unchanged.  + Unchanged bilateral adrenal nodules. Plan:  -- He will continue Imatinib 400 mg once daily until disease progression or unacceptable toxicity. Educated patient the importance of medical adherence and compliance. -- Lab check CBC, CMP  -- He will f/u Dr. Carolina Sandra and  as needed   -- Advised to contact us if any issues or concerns. -- He will RTC 2 months. Always sooner if required. Cluster headache/ Intractable headache  -- 6/9/2022 Brain MRI as above  -- Short-course Prednisone will be prescribed if worsening headache while taking Gleevec. -- We also did not encourage him to take long-time Prednisone as concerned potential S/Es. Pt awares. -- Denied any worsening issues at this time.   -- Advised to f/u Neurology for long term control. He would like to see a new Neurologist.  Referral will be generated. CNIV  -- Zofran PRN      Pulmonary embolus  -- He was diagnosed with one-time pulmonary embolus in May of 2016. Patient was being followed by Dr. Kadie Bruno who retired recently. He states that he is taking Xarelto 20mg PO daily since then. Thrombophilia w/u was negative reportedly. -- 10/12/2020 CTA reported no evidence of pulmonary embolism. -- Xarelto was d/c. Patient was advised to continue on ASA 81 mg for thromboprophylaxis. Polycythemia  -- He is an active smoker. He did not receive his phlebotomy for months for his polycythemia. Negative (wild type) for the JAK2 V617F.   -- His polycythemia was likely secondary 2/2 smoking, COPD. Recent CBC on 8/11/2022  showed improving H/H, 14.5/44. 1. I have counseled him on smoking cessation. -- Fu PCP for smoking cessation. -- Monitor CBC periodically      Orders Placed This Encounter    Tomasz Olmstead MD, Neurology, [place]     Referral Priority:   Urgent     Referral Type:   Eval and Treat     Referral Reason:   Specialty Services Required     Referred to Provider:   Magdy Hill MD     Requested Specialty:   Neurology     Number of Visits Requested:   1    DISCONTD: predniSONE (DELTASONE) 10 MG tablet     Sig: take 1 tablet by mouth once daily WITH BREAKFAST    predniSONE (DELTASONE) 10 MG tablet     Sig: take 1 tablet by mouth once daily WITH BREAKFAST     Dispense:  14 tablet     Refill:  0    GLEEVEC 400 MG chemo tablet     Sig: TAKE 1 TABLET (400MG) BY MOUTH  ONCE DAILY WITH A MEAL AND FULL  GLASS OF WATER     Dispense:  30 tablet     Refill:  3                 All of patient's questions answered to their apparent satisfaction. They verbally show understanding and agreement with aforementioned plan. Enrique Ervin MD  3/6/23         Parts of this document has been produced using Dragon dictation system.  Unrecognized errors in transcription may be present. Please do not hesitate to reach out for any questions or clarifications.       CC: RIVERA Ardon

## 2023-03-09 RX ORDER — IMATINIB MESYLATE 400 MG/1
TABLET ORAL
Qty: 30 TABLET | Refills: 3 | Status: ACTIVE | OUTPATIENT
Start: 2023-03-09

## 2023-03-17 ENCOUNTER — OFFICE VISIT (OUTPATIENT)
Age: 58
End: 2023-03-17
Payer: COMMERCIAL

## 2023-03-17 VITALS
DIASTOLIC BLOOD PRESSURE: 78 MMHG | SYSTOLIC BLOOD PRESSURE: 122 MMHG | HEART RATE: 74 BPM | HEIGHT: 71 IN | WEIGHT: 236 LBS | OXYGEN SATURATION: 96 % | BODY MASS INDEX: 33.04 KG/M2

## 2023-03-17 DIAGNOSIS — I25.10 ATHEROSCLEROSIS OF NATIVE CORONARY ARTERY OF NATIVE HEART WITHOUT ANGINA PECTORIS: ICD-10-CM

## 2023-03-17 DIAGNOSIS — I49.3 VENTRICULAR PREMATURE DEPOLARIZATION: ICD-10-CM

## 2023-03-17 DIAGNOSIS — I25.5 ISCHEMIC CARDIOMYOPATHY: Primary | ICD-10-CM

## 2023-03-17 DIAGNOSIS — C49.A0 GASTROINTESTINAL STROMAL TUMOR, UNSPECIFIED SITE (HCC): ICD-10-CM

## 2023-03-17 DIAGNOSIS — I10 ESSENTIAL (PRIMARY) HYPERTENSION: ICD-10-CM

## 2023-03-17 DIAGNOSIS — Z72.0 TOBACCO USE: ICD-10-CM

## 2023-03-17 DIAGNOSIS — Z95.1 PRESENCE OF AORTOCORONARY BYPASS GRAFT: ICD-10-CM

## 2023-03-17 DIAGNOSIS — E78.5 HYPERLIPIDEMIA, UNSPECIFIED HYPERLIPIDEMIA TYPE: ICD-10-CM

## 2023-03-17 PROCEDURE — 3074F SYST BP LT 130 MM HG: CPT | Performed by: INTERNAL MEDICINE

## 2023-03-17 PROCEDURE — 99214 OFFICE O/P EST MOD 30 MIN: CPT | Performed by: INTERNAL MEDICINE

## 2023-03-17 PROCEDURE — 3078F DIAST BP <80 MM HG: CPT | Performed by: INTERNAL MEDICINE

## 2023-03-17 PROCEDURE — 93000 ELECTROCARDIOGRAM COMPLETE: CPT | Performed by: INTERNAL MEDICINE

## 2023-03-17 ASSESSMENT — ANXIETY QUESTIONNAIRES
2. NOT BEING ABLE TO STOP OR CONTROL WORRYING: 0
GAD7 TOTAL SCORE: 0
3. WORRYING TOO MUCH ABOUT DIFFERENT THINGS: 0
4. TROUBLE RELAXING: 0
7. FEELING AFRAID AS IF SOMETHING AWFUL MIGHT HAPPEN: 0
5. BEING SO RESTLESS THAT IT IS HARD TO SIT STILL: 0
1. FEELING NERVOUS, ANXIOUS, OR ON EDGE: 0
6. BECOMING EASILY ANNOYED OR IRRITABLE: 0

## 2023-03-17 ASSESSMENT — ENCOUNTER SYMPTOMS
ABDOMINAL PAIN: 0
COUGH: 0
SHORTNESS OF BREATH: 1
ABDOMINAL DISTENTION: 0
VOMITING: 0
NAUSEA: 0
SORE THROAT: 0

## 2023-03-17 ASSESSMENT — PATIENT HEALTH QUESTIONNAIRE - PHQ9
SUM OF ALL RESPONSES TO PHQ QUESTIONS 1-9: 0
SUM OF ALL RESPONSES TO PHQ QUESTIONS 1-9: 0
1. LITTLE INTEREST OR PLEASURE IN DOING THINGS: 0
SUM OF ALL RESPONSES TO PHQ9 QUESTIONS 1 & 2: 0
SUM OF ALL RESPONSES TO PHQ QUESTIONS 1-9: 0
2. FEELING DOWN, DEPRESSED OR HOPELESS: 0
SUM OF ALL RESPONSES TO PHQ QUESTIONS 1-9: 0

## 2023-03-17 NOTE — PROGRESS NOTES
Cal Julien presents today for   Chief Complaint   Patient presents with    Follow-up     6 month       Cal Julien preferred language for health care discussion is english/other.    Is someone accompanying this pt? no    Is the patient using any DME equipment during OV? no    Depression Screening:  Depression: Not at risk    PHQ-2 Score: 0        Learning Assessment:  Who is the primary learner? Patient    What is the preferred language for health care of the primary learner? ENGLISH    How does the primary learner prefer to learn new concepts? DEMONSTRATION    Answered By patient    Relationship to Learner SELF           Pt currently taking Anticoagulant therapy? no    Pt currently taking Antiplatelet therapy ? Aspirin 325 mg daily      Coordination of Care:  1. Have you been to the ER, urgent care clinic since your last visit? Hospitalized since your last visit? no    2. Have you seen or consulted any other health care providers outside of the [facility] since your last visit? Include any pap smears or colon screening. no

## 2023-03-17 NOTE — PROGRESS NOTES
03/17/23     Kelsi Herrera  is a 59 y.o. male     Chief Complaint   Patient presents with    Follow-up     6 month       HPI    Patient presents for a follow-up office visit. He has a past medical history significant for known coronary artery disease with a prior myocardial infarction in the past to his lateral wall with stenting of his obtuse marginal branch. He also had a three-vessel bypass CABG in 2009. His long-standing hypertension, dyslipidemia, COPD with active tobacco use. The patient was [facility] in [facility] for an acute bilateral pulmonary embolus. He is found to be quite hypoxic at that time. He was started on anticoagulation. He underwent an echocardiogram during his hospital stay which showed a mildly depressed LV systolic function, EF 33-28% with a mildly dilated right ventricle with mildly reduced systolic function, but no obvious pulmonary hypertension. The patient remained on Xarelto for anticoagulation since that time. Patient underwent a pharmacologic nuclear stress test in August 2016 which showed a mild to moderately depressed LV function, EF 40% with a mostly fixed but partially reversible distal posterior lateral perfusion defect likely representing an area of prior infarct with lemuel-infarct ischemia. This was not significantly changed compared to his known coronary anatomy, so he has been managed medically. Patient underwent repeat noninvasive cardiac testing in June 2021. His nuclear stress test showed a fixed inferolateral defect, is consistent with prior infarct and ejection fraction in the 42% range. This was essentially unchanged compared to his stress test from 2016. His echocardiogram showed no significant valvular heart disease with a low normal LVEF. He underwent extensive abdominal surgery in April 2022 to remove additional GIST tumors. He states he had 11 tumors and the surgery took him almost 14 hours.   As result he lost about 30 pounds in weight postoperatively.  He states he has  regained most of the weight back.    He underwent a follow-up echocardiogram earlier today in our office.  This was not significant change compared to his prior studies.  EF 40 to 45% with inferolateral hypokinesis and no significant valvular heart disease.  He was last seen in our office 6 months ago.  Since last visit, he denies any worsening shortness of breath from baseline.  He is again smoking about a pack of cigarettes a day.  He denies any chest pain or pressure.  He has been having increased frequency of cluster headaches which she has had in the past.  He continues to follow-up with neurology for his headaches.    Past Medical History:   Diagnosis Date    CAD (coronary artery disease)     Carpal tunnel syndrome     Depression     H/O echocardiogram 11/2017    EF 35-40%, mild LVH, biatrial enlargement, mild TR, RVSP 41    Hemorrhoids     History of esophagitis     History of malignant gastrointestinal stromal tumor (GIST)     Hypercholesterolemia     Hypertension     Myocardial infarction (HCC)     Pulmonary embolus (HCC) June 2016    Bilateral    S/P CABG x 3 2009    LIMA to LAD, SVG to RPDA, SVG to diagonal    S/P cardiac catheterization November 2011    Patent COURTNEY to LAD, patent SVG to diet, and occluded SVG to RPDA,  native LAD 85% proximal stenosis, left circumflex 20% diffuse disease, RCA distal 70% stenosis,, EF 45%    Secondary polycythemia      Current Outpatient Medications   Medication Sig Dispense Refill    GLEEVEC 400 MG chemo tablet TAKE 1 TABLET (400MG) BY MOUTH  ONCE DAILY WITH A MEAL AND FULL  GLASS OF WATER 30 tablet 3    predniSONE (DELTASONE) 10 MG tablet take 1 tablet by mouth once daily WITH BREAKFAST 14 tablet 0    aspirin 325 MG tablet Take 325 mg by mouth daily      hydroCHLOROthiazide (HYDRODIURIL) 25 MG tablet take 1 tablet by mouth once daily      metoprolol tartrate (LOPRESSOR) 25 MG tablet take 1 tablet by mouth  twice a day      ramipril (ALTACE) 10 MG capsule take 1 capsule by mouth once daily      traZODone (DESYREL) 150 MG tablet Take 150 mg by mouth nightly       No current facility-administered medications for this visit. No Known Allergies  Social History     Tobacco Use    Smoking status: Every Day     Packs/day: 1.50     Types: Cigarettes    Smokeless tobacco: Never   Vaping Use    Vaping Use: Never used   Substance Use Topics    Alcohol use: Yes    Drug use: No     History reviewed. No pertinent family history. Review of Systems   Constitutional:  Negative for chills, fatigue and fever. HENT:  Negative for congestion, hearing loss, nosebleeds and sore throat. Eyes:  Negative for visual disturbance. Respiratory:  Positive for shortness of breath. Negative for cough. Cardiovascular:  Negative for chest pain, palpitations and leg swelling. Gastrointestinal:  Negative for abdominal distention, abdominal pain, nausea and vomiting. Endocrine: Negative for cold intolerance and heat intolerance. Genitourinary:  Negative for dysuria. Musculoskeletal:  Negative for arthralgias. Skin:  Negative for rash. Neurological:  Positive for headaches. Negative for dizziness, syncope and weakness. Hematological:  Does not bruise/bleed easily. Psychiatric/Behavioral:  Negative for suicidal ideas. /78 (Site: Left Upper Arm, Position: Sitting, Cuff Size: Medium Adult)   Pulse 74   Ht 5' 11\" (1.803 m)   Wt 236 lb (107 kg)   SpO2 96%   BMI 32.92 kg/m²     Objective:   Physical Exam  Constitutional:       General: He is not in acute distress. HENT:      Head: Normocephalic. Neck:      Vascular: No carotid bruit or JVD. Cardiovascular:      Rate and Rhythm: Normal rate and regular rhythm. No extrasystoles are present. Heart sounds: No murmur heard. No gallop. Pulmonary:      Effort: Pulmonary effort is normal.      Breath sounds: Decreased air movement present.  No wheezing, rhonchi or rales. Abdominal:      General: Bowel sounds are normal. There is no distension. Palpations: Abdomen is soft. Tenderness: There is no abdominal tenderness. Musculoskeletal:         General: No swelling or deformity. Skin:     General: Skin is warm and dry. Findings: No rash. Neurological:      General: No focal deficit present. Mental Status: He is alert and oriented to person, place, and time. Psychiatric:         Mood and Affect: Mood normal.         Behavior: Behavior normal.         Assessment / Plan:   Coronary artery disease. Status post three-vessel CABG in 2009. LIMA to LAD, SVG to diagonal SVG to RPDA. The SVG to PDA is known to be occluded on repeat cardiac catheterization in 2011. His native RCA has a 70% distal stenosis which has been managed medically. Patient last underwent a repeat pharmacologic nuclear stress test in June 2021, which showed an ejection fraction of 42 % and a primarily fixed posterior lateral defect consistent with his known coronary anatomy. Patient reports that he did not have typical anginal symptoms in the past.   He remains on a full dose aspirin, beta-blocker, but has been intolerant to multiple statins in the past.  He can continue his current medical regimen. Ischemic cardiomyopathy. EF 40-45% on echocardiogram today with inferolateral hypokinesis. This is unchanged compared to his prior studies. No evidence of decompensated heart failure. He remains on a beta-blocker and an ACE inhibitor, both of which I would continue. He would be a good candidate to switch over to [facility]. However, since the patient has been doing well on his current medications he wishes to continue his chronic medications. Hypertension. Patient blood pressure is reasonably well-controlled on his current medical regimen. All of which I would continue. Dyslipidemia. Patient did not tolerate simvastatin or Crestor.   He may be a candidate for Repatha in the future. At this point he is not interested in starting any additional medications. Tobacco use disorder. Patient is again smoking a pack of cigarettes a day. He is not interested in trying to quit at this time. GIST tumor. Patient continues to take oral chemotherapy. This is followed closely by his oncologist.  He underwent extensive abdominal surgery in April 2022 for recurrent tumors. Chronic cluster headaches. I recommended he continue to follow-up with neurology. I would recommend avoiding calcium channel blockers for his headaches since he has been on a stable cardiac regimen for quite some time. Followup in 6 months, sooner if needed.         Orly Barkley MD

## 2023-05-02 DIAGNOSIS — C49.A0 GASTROINTESTINAL STROMAL TUMOR, UNSPECIFIED SITE (HCC): ICD-10-CM

## 2023-05-02 DIAGNOSIS — R19.00 INTRA-ABDOMINAL AND PELVIC SWELLING, MASS AND LUMP, UNSPECIFIED SITE: ICD-10-CM

## 2023-05-02 DIAGNOSIS — Z51.11 ENCOUNTER FOR ANTINEOPLASTIC CHEMOTHERAPY: ICD-10-CM

## 2023-05-02 DIAGNOSIS — D75.1 SECONDARY POLYCYTHEMIA: ICD-10-CM

## 2023-05-02 DIAGNOSIS — G44.021 CHRONIC CLUSTER HEADACHE, INTRACTABLE: ICD-10-CM

## 2023-05-02 RX ORDER — PREDNISONE 10 MG/1
TABLET ORAL
Qty: 14 TABLET | Refills: 0 | Status: SHIPPED | OUTPATIENT
Start: 2023-05-02

## 2023-05-02 RX ORDER — IMATINIB MESYLATE 400 MG/1
TABLET ORAL
Qty: 30 TABLET | Refills: 3 | Status: ACTIVE | OUTPATIENT
Start: 2023-05-02

## 2023-05-12 ENCOUNTER — OFFICE VISIT (OUTPATIENT)
Age: 58
End: 2023-05-12
Payer: COMMERCIAL

## 2023-05-12 VITALS
OXYGEN SATURATION: 94 % | HEART RATE: 60 BPM | BODY MASS INDEX: 32.76 KG/M2 | WEIGHT: 234 LBS | SYSTOLIC BLOOD PRESSURE: 133 MMHG | RESPIRATION RATE: 18 BRPM | HEIGHT: 71 IN | DIASTOLIC BLOOD PRESSURE: 91 MMHG

## 2023-05-12 DIAGNOSIS — R19.00 INTRA-ABDOMINAL AND PELVIC SWELLING, MASS AND LUMP, UNSPECIFIED SITE: ICD-10-CM

## 2023-05-12 DIAGNOSIS — Z79.899 ON ANTINEOPLASTIC CHEMOTHERAPY: ICD-10-CM

## 2023-05-12 DIAGNOSIS — C49.A0 GASTROINTESTINAL STROMAL TUMOR, UNSPECIFIED SITE (HCC): Primary | ICD-10-CM

## 2023-05-12 DIAGNOSIS — G44.021 CHRONIC CLUSTER HEADACHE, INTRACTABLE: ICD-10-CM

## 2023-05-12 PROCEDURE — 3074F SYST BP LT 130 MM HG: CPT | Performed by: INTERNAL MEDICINE

## 2023-05-12 PROCEDURE — 3078F DIAST BP <80 MM HG: CPT | Performed by: INTERNAL MEDICINE

## 2023-05-12 PROCEDURE — 99214 OFFICE O/P EST MOD 30 MIN: CPT | Performed by: INTERNAL MEDICINE

## 2023-05-12 RX ORDER — PREDNISONE 10 MG/1
TABLET ORAL
Qty: 14 TABLET | Refills: 0 | Status: SHIPPED | OUTPATIENT
Start: 2023-05-12 | End: 2023-07-07 | Stop reason: SDUPTHER

## 2023-05-12 ASSESSMENT — ENCOUNTER SYMPTOMS
GASTROINTESTINAL NEGATIVE: 1
RESPIRATORY NEGATIVE: 1
EYES NEGATIVE: 1

## 2023-07-06 DIAGNOSIS — R19.00 INTRA-ABDOMINAL AND PELVIC SWELLING, MASS AND LUMP, UNSPECIFIED SITE: ICD-10-CM

## 2023-07-06 DIAGNOSIS — Z79.899 ON ANTINEOPLASTIC CHEMOTHERAPY: ICD-10-CM

## 2023-07-06 DIAGNOSIS — G44.021 CHRONIC CLUSTER HEADACHE, INTRACTABLE: ICD-10-CM

## 2023-07-06 DIAGNOSIS — C49.A0 GASTROINTESTINAL STROMAL TUMOR, UNSPECIFIED SITE (HCC): ICD-10-CM

## 2023-07-06 ASSESSMENT — ENCOUNTER SYMPTOMS
DIARRHEA: 0
BACK PAIN: 0
ABDOMINAL PAIN: 0
COUGH: 0
NAUSEA: 0
VOMITING: 0
SHORTNESS OF BREATH: 0

## 2023-07-06 NOTE — TELEPHONE ENCOUNTER
Patient calling in requesting a refill on Prednisone 10mg, last filled on 5/12/23 to take 1 tab PO daily x14 days.

## 2023-07-07 DIAGNOSIS — C49.A0 GASTROINTESTINAL STROMAL TUMOR, UNSPECIFIED SITE (HCC): ICD-10-CM

## 2023-07-07 DIAGNOSIS — G44.021 CHRONIC CLUSTER HEADACHE, INTRACTABLE: ICD-10-CM

## 2023-07-07 DIAGNOSIS — R19.00 INTRA-ABDOMINAL AND PELVIC SWELLING, MASS AND LUMP, UNSPECIFIED SITE: ICD-10-CM

## 2023-07-07 DIAGNOSIS — Z79.899 ON ANTINEOPLASTIC CHEMOTHERAPY: ICD-10-CM

## 2023-07-07 RX ORDER — PREDNISONE 10 MG/1
TABLET ORAL
Qty: 14 TABLET | Refills: 0 | Status: SHIPPED | OUTPATIENT
Start: 2023-07-07

## 2023-07-13 ENCOUNTER — OFFICE VISIT (OUTPATIENT)
Age: 58
End: 2023-07-13
Payer: COMMERCIAL

## 2023-07-13 VITALS
OXYGEN SATURATION: 93 % | BODY MASS INDEX: 32.2 KG/M2 | RESPIRATION RATE: 18 BRPM | WEIGHT: 230 LBS | HEIGHT: 71 IN | DIASTOLIC BLOOD PRESSURE: 80 MMHG | SYSTOLIC BLOOD PRESSURE: 112 MMHG | HEART RATE: 69 BPM

## 2023-07-13 DIAGNOSIS — G47.30 SLEEP DISORDER BREATHING: Primary | ICD-10-CM

## 2023-07-13 DIAGNOSIS — G93.9: ICD-10-CM

## 2023-07-13 PROCEDURE — 3079F DIAST BP 80-89 MM HG: CPT | Performed by: PSYCHIATRY & NEUROLOGY

## 2023-07-13 PROCEDURE — 3074F SYST BP LT 130 MM HG: CPT | Performed by: PSYCHIATRY & NEUROLOGY

## 2023-07-13 PROCEDURE — 99204 OFFICE O/P NEW MOD 45 MIN: CPT | Performed by: PSYCHIATRY & NEUROLOGY

## 2023-07-13 RX ORDER — METHYLPREDNISOLONE 4 MG/1
TABLET ORAL
Qty: 1 KIT | Refills: 1 | Status: SHIPPED | OUTPATIENT
Start: 2023-07-13 | End: 2023-07-19

## 2023-07-13 RX ORDER — DIVALPROEX SODIUM 250 MG/1
750 TABLET, EXTENDED RELEASE ORAL ONCE
Qty: 120 TABLET | Refills: 3 | Status: SHIPPED | OUTPATIENT
Start: 2023-07-13 | End: 2023-07-13

## 2023-07-14 RX ORDER — PREDNISONE 10 MG/1
TABLET ORAL
Qty: 14 TABLET | Refills: 0 | Status: SHIPPED | OUTPATIENT
Start: 2023-07-14

## 2023-07-20 ENCOUNTER — TELEPHONE (OUTPATIENT)
Age: 58
End: 2023-07-20

## 2023-07-20 NOTE — TELEPHONE ENCOUNTER
Patient called the pharmacy and states that they told him he needs a new prescription script for divalproex (DEPAKOTE ER) 250 MG extended release tablet (). Please advise.

## 2023-07-21 RX ORDER — DIVALPROEX SODIUM 250 MG/1
750 TABLET, EXTENDED RELEASE ORAL DAILY
Qty: 90 TABLET | Refills: 3 | Status: SHIPPED | OUTPATIENT
Start: 2023-07-21

## 2023-07-24 RX ORDER — RAMIPRIL 10 MG/1
CAPSULE ORAL
Qty: 90 CAPSULE | Refills: 3 | Status: SHIPPED | OUTPATIENT
Start: 2023-07-24

## 2023-07-26 DIAGNOSIS — C49.A0 GASTROINTESTINAL STROMAL TUMOR, UNSPECIFIED SITE (HCC): Primary | ICD-10-CM

## 2023-07-26 DIAGNOSIS — D75.1 SECONDARY POLYCYTHEMIA: ICD-10-CM

## 2023-07-26 DIAGNOSIS — Z79.899 ON ANTINEOPLASTIC CHEMOTHERAPY: ICD-10-CM

## 2023-08-21 RX ORDER — HYDROCHLOROTHIAZIDE 25 MG/1
TABLET ORAL
Qty: 90 TABLET | Refills: 3 | Status: SHIPPED | OUTPATIENT
Start: 2023-08-21

## 2023-09-22 ENCOUNTER — OFFICE VISIT (OUTPATIENT)
Age: 58
End: 2023-09-22
Payer: COMMERCIAL

## 2023-09-22 VITALS
SYSTOLIC BLOOD PRESSURE: 132 MMHG | OXYGEN SATURATION: 94 % | WEIGHT: 233 LBS | DIASTOLIC BLOOD PRESSURE: 72 MMHG | BODY MASS INDEX: 32.62 KG/M2 | HEIGHT: 71 IN | HEART RATE: 56 BPM

## 2023-09-22 DIAGNOSIS — Z72.0 TOBACCO USE: ICD-10-CM

## 2023-09-22 DIAGNOSIS — I25.5 ISCHEMIC CARDIOMYOPATHY: ICD-10-CM

## 2023-09-22 DIAGNOSIS — E78.5 HYPERLIPIDEMIA, UNSPECIFIED HYPERLIPIDEMIA TYPE: ICD-10-CM

## 2023-09-22 DIAGNOSIS — I10 ESSENTIAL (PRIMARY) HYPERTENSION: ICD-10-CM

## 2023-09-22 DIAGNOSIS — I25.10 ATHEROSCLEROSIS OF NATIVE CORONARY ARTERY OF NATIVE HEART WITHOUT ANGINA PECTORIS: Primary | ICD-10-CM

## 2023-09-22 DIAGNOSIS — C49.A0 GASTROINTESTINAL STROMAL TUMOR, UNSPECIFIED SITE (HCC): ICD-10-CM

## 2023-09-22 DIAGNOSIS — Z95.1 PRESENCE OF AORTOCORONARY BYPASS GRAFT: ICD-10-CM

## 2023-09-22 PROBLEM — C49.A4 GIST (GASTROINTESTINAL STROMA TUMOR), MALIGNANT, COLON (HCC): Status: ACTIVE | Noted: 2022-04-05

## 2023-09-22 PROCEDURE — 3078F DIAST BP <80 MM HG: CPT | Performed by: INTERNAL MEDICINE

## 2023-09-22 PROCEDURE — 93000 ELECTROCARDIOGRAM COMPLETE: CPT | Performed by: INTERNAL MEDICINE

## 2023-09-22 PROCEDURE — 3075F SYST BP GE 130 - 139MM HG: CPT | Performed by: INTERNAL MEDICINE

## 2023-09-22 PROCEDURE — 99214 OFFICE O/P EST MOD 30 MIN: CPT | Performed by: INTERNAL MEDICINE

## 2023-09-22 ASSESSMENT — PATIENT HEALTH QUESTIONNAIRE - PHQ9
1. LITTLE INTEREST OR PLEASURE IN DOING THINGS: 0
SUM OF ALL RESPONSES TO PHQ QUESTIONS 1-9: 0
SUM OF ALL RESPONSES TO PHQ9 QUESTIONS 1 & 2: 0
2. FEELING DOWN, DEPRESSED OR HOPELESS: 0
SUM OF ALL RESPONSES TO PHQ QUESTIONS 1-9: 0

## 2023-09-22 ASSESSMENT — ANXIETY QUESTIONNAIRES
5. BEING SO RESTLESS THAT IT IS HARD TO SIT STILL: 0
6. BECOMING EASILY ANNOYED OR IRRITABLE: 0
3. WORRYING TOO MUCH ABOUT DIFFERENT THINGS: 0
7. FEELING AFRAID AS IF SOMETHING AWFUL MIGHT HAPPEN: 0
1. FEELING NERVOUS, ANXIOUS, OR ON EDGE: 0
GAD7 TOTAL SCORE: 0
4. TROUBLE RELAXING: 0
2. NOT BEING ABLE TO STOP OR CONTROL WORRYING: 0

## 2023-09-22 ASSESSMENT — ENCOUNTER SYMPTOMS
ABDOMINAL DISTENTION: 0
ABDOMINAL PAIN: 0
COUGH: 0
NAUSEA: 0
VOMITING: 0
SORE THROAT: 0
SHORTNESS OF BREATH: 1

## 2023-11-17 ENCOUNTER — TRANSCRIBE ORDERS (OUTPATIENT)
Facility: HOSPITAL | Age: 58
End: 2023-11-17

## 2023-11-17 DIAGNOSIS — C49.A0 MALIGNANT GASTROINTESTINAL STROMAL TUMOR, UNSPECIFIED SITE (HCC): Primary | ICD-10-CM

## 2023-11-27 ENCOUNTER — HOSPITAL ENCOUNTER (OUTPATIENT)
Facility: HOSPITAL | Age: 58
Discharge: HOME | End: 2023-11-30
Attending: INTERNAL MEDICINE
Payer: COMMERCIAL

## 2023-11-27 DIAGNOSIS — C49.A0 MALIGNANT GASTROINTESTINAL STROMAL TUMOR, UNSPECIFIED SITE (HCC): ICD-10-CM

## 2023-11-27 LAB — CREAT UR-MCNC: 0.8 MG/DL (ref 0.6–1.3)

## 2023-11-27 PROCEDURE — 6360000004 HC RX CONTRAST MEDICATION: Performed by: INTERNAL MEDICINE

## 2023-11-27 PROCEDURE — 82565 ASSAY OF CREATININE: CPT

## 2023-11-27 PROCEDURE — 74177 CT ABD & PELVIS W/CONTRAST: CPT

## 2023-11-27 RX ORDER — DIVALPROEX SODIUM 250 MG/1
750 TABLET, EXTENDED RELEASE ORAL DAILY
Qty: 90 TABLET | Refills: 3 | Status: SHIPPED | OUTPATIENT
Start: 2023-11-27

## 2023-11-27 RX ADMIN — IOPAMIDOL 100 ML: 612 INJECTION, SOLUTION INTRAVENOUS at 14:11

## 2024-03-22 ENCOUNTER — OFFICE VISIT (OUTPATIENT)
Age: 59
End: 2024-03-22
Payer: COMMERCIAL

## 2024-03-22 VITALS
HEART RATE: 68 BPM | BODY MASS INDEX: 31.08 KG/M2 | WEIGHT: 222 LBS | DIASTOLIC BLOOD PRESSURE: 82 MMHG | SYSTOLIC BLOOD PRESSURE: 130 MMHG | HEIGHT: 71 IN | OXYGEN SATURATION: 99 %

## 2024-03-22 DIAGNOSIS — Z95.1 PRESENCE OF AORTOCORONARY BYPASS GRAFT: ICD-10-CM

## 2024-03-22 DIAGNOSIS — E78.5 HYPERLIPIDEMIA, UNSPECIFIED HYPERLIPIDEMIA TYPE: ICD-10-CM

## 2024-03-22 DIAGNOSIS — I25.10 ATHEROSCLEROSIS OF NATIVE CORONARY ARTERY OF NATIVE HEART WITHOUT ANGINA PECTORIS: Primary | ICD-10-CM

## 2024-03-22 DIAGNOSIS — I10 ESSENTIAL (PRIMARY) HYPERTENSION: ICD-10-CM

## 2024-03-22 DIAGNOSIS — I25.5 ISCHEMIC CARDIOMYOPATHY: ICD-10-CM

## 2024-03-22 DIAGNOSIS — Z72.0 TOBACCO USE: ICD-10-CM

## 2024-03-22 DIAGNOSIS — C49.A0 GASTROINTESTINAL STROMAL TUMOR, UNSPECIFIED SITE (HCC): ICD-10-CM

## 2024-03-22 PROBLEM — G44.009 CLUSTER HEADACHE: Status: ACTIVE | Noted: 2023-11-17

## 2024-03-22 PROCEDURE — 93000 ELECTROCARDIOGRAM COMPLETE: CPT | Performed by: INTERNAL MEDICINE

## 2024-03-22 PROCEDURE — 99214 OFFICE O/P EST MOD 30 MIN: CPT | Performed by: INTERNAL MEDICINE

## 2024-03-22 PROCEDURE — 3079F DIAST BP 80-89 MM HG: CPT | Performed by: INTERNAL MEDICINE

## 2024-03-22 PROCEDURE — 3075F SYST BP GE 130 - 139MM HG: CPT | Performed by: INTERNAL MEDICINE

## 2024-03-22 ASSESSMENT — PATIENT HEALTH QUESTIONNAIRE - PHQ9
SUM OF ALL RESPONSES TO PHQ QUESTIONS 1-9: 0
8. MOVING OR SPEAKING SO SLOWLY THAT OTHER PEOPLE COULD HAVE NOTICED. OR THE OPPOSITE, BEING SO FIGETY OR RESTLESS THAT YOU HAVE BEEN MOVING AROUND A LOT MORE THAN USUAL: NOT AT ALL
4. FEELING TIRED OR HAVING LITTLE ENERGY: NOT AT ALL
6. FEELING BAD ABOUT YOURSELF - OR THAT YOU ARE A FAILURE OR HAVE LET YOURSELF OR YOUR FAMILY DOWN: NOT AT ALL
SUM OF ALL RESPONSES TO PHQ9 QUESTIONS 1 & 2: 0
2. FEELING DOWN, DEPRESSED OR HOPELESS: NOT AT ALL
SUM OF ALL RESPONSES TO PHQ QUESTIONS 1-9: 0
SUM OF ALL RESPONSES TO PHQ QUESTIONS 1-9: 0
1. LITTLE INTEREST OR PLEASURE IN DOING THINGS: NOT AT ALL
9. THOUGHTS THAT YOU WOULD BE BETTER OFF DEAD, OR OF HURTING YOURSELF: NOT AT ALL
5. POOR APPETITE OR OVEREATING: NOT AT ALL
7. TROUBLE CONCENTRATING ON THINGS, SUCH AS READING THE NEWSPAPER OR WATCHING TELEVISION: NOT AT ALL
SUM OF ALL RESPONSES TO PHQ QUESTIONS 1-9: 0
10. IF YOU CHECKED OFF ANY PROBLEMS, HOW DIFFICULT HAVE THESE PROBLEMS MADE IT FOR YOU TO DO YOUR WORK, TAKE CARE OF THINGS AT HOME, OR GET ALONG WITH OTHER PEOPLE: NOT DIFFICULT AT ALL
3. TROUBLE FALLING OR STAYING ASLEEP: NOT AT ALL

## 2024-03-22 ASSESSMENT — ANXIETY QUESTIONNAIRES
5. BEING SO RESTLESS THAT IT IS HARD TO SIT STILL: NOT AT ALL
7. FEELING AFRAID AS IF SOMETHING AWFUL MIGHT HAPPEN: NOT AT ALL
4. TROUBLE RELAXING: NOT AT ALL
GAD7 TOTAL SCORE: 0
1. FEELING NERVOUS, ANXIOUS, OR ON EDGE: NOT AT ALL
6. BECOMING EASILY ANNOYED OR IRRITABLE: NOT AT ALL
3. WORRYING TOO MUCH ABOUT DIFFERENT THINGS: NOT AT ALL
2. NOT BEING ABLE TO STOP OR CONTROL WORRYING: NOT AT ALL

## 2024-03-22 ASSESSMENT — ENCOUNTER SYMPTOMS
NAUSEA: 0
COUGH: 0
SORE THROAT: 0
SHORTNESS OF BREATH: 1
VOMITING: 0
ABDOMINAL DISTENTION: 0

## 2024-03-22 NOTE — PROGRESS NOTES
Cal Julien presents today for   Chief Complaint   Patient presents with    Follow-up     6 month       Cal Julien preferred language for health care discussion is english/other.    Is someone accompanying this pt? no    Is the patient using any DME equipment during OV? no    Depression Screening:  Depression: Not at risk (3/22/2024)    PHQ-2     PHQ-2 Score: 0        Learning Assessment:  Who is the primary learner? Patient    What is the preferred language for health care of the primary learner? ENGLISH    How does the primary learner prefer to learn new concepts? DEMONSTRATION    Answered By patient    Relationship to Learner SELF           Pt currently taking Anticoagulant therapy? no    Pt currently taking Antiplatelet therapy ? no      Coordination of Care:  1. Have you been to the ER, urgent care clinic since your last visit? Hospitalized since your last visit? no    2. Have you seen or consulted any other health care providers outside of the [facility] since your last visit? Include any pap smears or colon screening. no    
rhythm. No extrasystoles are present.     Heart sounds: No murmur heard.     No gallop.   Pulmonary:      Effort: Pulmonary effort is normal.      Breath sounds: Decreased air movement present. No wheezing, rhonchi or rales.   Abdominal:      General: Bowel sounds are normal. There is no distension.      Palpations: Abdomen is soft.      Tenderness: There is no abdominal tenderness.   Musculoskeletal:         General: No swelling or deformity.   Skin:     General: Skin is warm and dry.      Findings: No rash.   Neurological:      General: No focal deficit present.      Mental Status: He is alert and oriented to person, place, and time.   Psychiatric:         Mood and Affect: Mood normal.         Behavior: Behavior normal.         EKG: Normal sinus rhythm, normal axis, borderline inferior Q waves, poor R wave progression, no ST or T wave changes concerning for ischemia.  Compared to the previous EKG, heart rate has increased.    Assessment / Plan:   Coronary artery disease.  Status post three-vessel CABG in 2009.  LIMA to LAD, SVG to diagonal SVG to RPDA.  The SVG to PDA is known to be occluded on repeat cardiac catheterization in 2011.  His native RCA has a 70% distal stenosis which has been managed medically.  Patient last underwent a repeat pharmacologic nuclear stress test in June 2021, which showed an ejection fraction of 42 % and a primarily fixed posterior lateral defect consistent with his known coronary anatomy.  He remains on a full dose aspirin, beta-blocker, but has been intolerant to multiple statins in the past.  He is not interested in trying any other lipid-lowering therapy.  I have recommended repeating a pharmacologic nuclear stress test later this year to evaluate for any new ischemia.    Ischemic cardiomyopathy.  EF 40-45% on his latest echocardiogram with inferolateral hypokinesis.  This is unchanged compared to his prior studies.  No evidence of decompensated heart failure.  He remains on a

## 2024-07-23 RX ORDER — RAMIPRIL 10 MG/1
CAPSULE ORAL
Qty: 90 CAPSULE | Refills: 3 | Status: SHIPPED | OUTPATIENT
Start: 2024-07-23

## 2024-07-25 ENCOUNTER — HOSPITAL ENCOUNTER (OUTPATIENT)
Facility: HOSPITAL | Age: 59
Discharge: HOME | End: 2024-07-25
Attending: INTERNAL MEDICINE
Payer: COMMERCIAL

## 2024-07-25 DIAGNOSIS — C49.A0 MALIGNANT GASTROINTESTINAL STROMAL TUMOR, UNSPECIFIED SITE (HCC): ICD-10-CM

## 2024-07-25 LAB — CREAT UR-MCNC: 0.8 MG/DL (ref 0.6–1.3)

## 2024-07-25 PROCEDURE — 82565 ASSAY OF CREATININE: CPT

## 2024-07-25 PROCEDURE — 6360000004 HC RX CONTRAST MEDICATION: Performed by: INTERNAL MEDICINE

## 2024-07-25 PROCEDURE — 74177 CT ABD & PELVIS W/CONTRAST: CPT

## 2024-07-25 RX ADMIN — DIATRIZOATE MEGLUMINE AND DIATRIZOATE SODIUM 30 ML: 660; 100 LIQUID ORAL; RECTAL at 09:18

## 2024-07-25 RX ADMIN — IOPAMIDOL 100 ML: 612 INJECTION, SOLUTION INTRAVENOUS at 10:39

## 2024-08-12 RX ORDER — HYDROCHLOROTHIAZIDE 25 MG/1
TABLET ORAL
Qty: 90 TABLET | Refills: 3 | Status: SHIPPED | OUTPATIENT
Start: 2024-08-12

## 2024-08-28 ENCOUNTER — TELEPHONE (OUTPATIENT)
Age: 59
End: 2024-08-28

## 2024-08-28 DIAGNOSIS — I25.10 ATHEROSCLEROSIS OF NATIVE CORONARY ARTERY OF NATIVE HEART WITHOUT ANGINA PECTORIS: Primary | ICD-10-CM

## 2024-08-28 NOTE — TELEPHONE ENCOUNTER
Verbal order and read back per Guru Brennan MD  Please let the patient know that his nuclear stress test showed that his perfusion imaging was unchanged.  His ejection fraction was lower than it was on his prior study, so I would like to order an echocardiogram to clarify his heart function.       -Had to leave a voicemail  Order has been placed

## 2024-08-28 NOTE — TELEPHONE ENCOUNTER
----- Message from Dr. Guru Brennan MD sent at 8/13/2024 12:45 PM EDT -----  Please let the patient know that his nuclear stress test showed that his perfusion imaging was unchanged.  His ejection fraction was lower than it was on his prior study, so I would like to order an echocardiogram to clarify his heart function.  ----- Message -----  From: Miladis Mason MA  Sent: 8/13/2024  10:51 AM EDT  To: Guru Brennan MD    Per your last note \"  Coronary artery disease.  Status post three-vessel CABG in 2009.  LIMA to LAD, SVG to diagonal SVG to RPDA.  The SVG to PDA is known to be occluded on repeat cardiac catheterization in 2011.  His native RCA has a 70% distal stenosis which has been managed medically.  Patient last underwent a repeat pharmacologic nuclear stress test in June 2021, which showed an ejection fraction of 42 % and a primarily fixed posterior lateral defect consistent with his known coronary anatomy.  He remains on a full dose aspirin, beta-blocker, but has been intolerant to multiple statins in the past.  He is not interested in trying any other lipid-lowering therapy.  I have recommended repeating a pharmacologic nuclear stress test later this year to evaluate for any new ischemia.

## 2024-09-04 NOTE — TELEPHONE ENCOUNTER
This has been fully explained to the patient, who indicates understanding.      -Echo is scheduled for the same day as his appointment with Dr. Brennan. 09-23-24.

## 2024-09-23 ENCOUNTER — OFFICE VISIT (OUTPATIENT)
Age: 59
End: 2024-09-23
Payer: COMMERCIAL

## 2024-09-23 VITALS
OXYGEN SATURATION: 96 % | WEIGHT: 230 LBS | SYSTOLIC BLOOD PRESSURE: 144 MMHG | DIASTOLIC BLOOD PRESSURE: 90 MMHG | HEIGHT: 71 IN | HEART RATE: 60 BPM | BODY MASS INDEX: 32.2 KG/M2

## 2024-09-23 DIAGNOSIS — Z95.1 PRESENCE OF AORTOCORONARY BYPASS GRAFT: ICD-10-CM

## 2024-09-23 DIAGNOSIS — C49.A0 GASTROINTESTINAL STROMAL TUMOR, UNSPECIFIED SITE (HCC): ICD-10-CM

## 2024-09-23 DIAGNOSIS — Z72.0 TOBACCO USE: ICD-10-CM

## 2024-09-23 DIAGNOSIS — I10 ESSENTIAL (PRIMARY) HYPERTENSION: ICD-10-CM

## 2024-09-23 DIAGNOSIS — I25.10 ATHEROSCLEROSIS OF NATIVE CORONARY ARTERY OF NATIVE HEART WITHOUT ANGINA PECTORIS: ICD-10-CM

## 2024-09-23 DIAGNOSIS — E78.5 HYPERLIPIDEMIA, UNSPECIFIED HYPERLIPIDEMIA TYPE: ICD-10-CM

## 2024-09-23 DIAGNOSIS — I25.5 ISCHEMIC CARDIOMYOPATHY: Primary | ICD-10-CM

## 2024-09-23 PROCEDURE — 99214 OFFICE O/P EST MOD 30 MIN: CPT | Performed by: INTERNAL MEDICINE

## 2024-09-23 PROCEDURE — 93000 ELECTROCARDIOGRAM COMPLETE: CPT | Performed by: INTERNAL MEDICINE

## 2024-09-23 PROCEDURE — 3075F SYST BP GE 130 - 139MM HG: CPT | Performed by: INTERNAL MEDICINE

## 2024-09-23 PROCEDURE — 3080F DIAST BP >= 90 MM HG: CPT | Performed by: INTERNAL MEDICINE

## 2024-09-23 ASSESSMENT — PATIENT HEALTH QUESTIONNAIRE - PHQ9
5. POOR APPETITE OR OVEREATING: NOT AT ALL
9. THOUGHTS THAT YOU WOULD BE BETTER OFF DEAD, OR OF HURTING YOURSELF: NOT AT ALL
SUM OF ALL RESPONSES TO PHQ QUESTIONS 1-9: 0
SUM OF ALL RESPONSES TO PHQ QUESTIONS 1-9: 0
SUM OF ALL RESPONSES TO PHQ9 QUESTIONS 1 & 2: 0
7. TROUBLE CONCENTRATING ON THINGS, SUCH AS READING THE NEWSPAPER OR WATCHING TELEVISION: NOT AT ALL
SUM OF ALL RESPONSES TO PHQ QUESTIONS 1-9: 0
2. FEELING DOWN, DEPRESSED OR HOPELESS: NOT AT ALL
1. LITTLE INTEREST OR PLEASURE IN DOING THINGS: NOT AT ALL
10. IF YOU CHECKED OFF ANY PROBLEMS, HOW DIFFICULT HAVE THESE PROBLEMS MADE IT FOR YOU TO DO YOUR WORK, TAKE CARE OF THINGS AT HOME, OR GET ALONG WITH OTHER PEOPLE: NOT DIFFICULT AT ALL
6. FEELING BAD ABOUT YOURSELF - OR THAT YOU ARE A FAILURE OR HAVE LET YOURSELF OR YOUR FAMILY DOWN: NOT AT ALL
SUM OF ALL RESPONSES TO PHQ QUESTIONS 1-9: 0
4. FEELING TIRED OR HAVING LITTLE ENERGY: NOT AT ALL
8. MOVING OR SPEAKING SO SLOWLY THAT OTHER PEOPLE COULD HAVE NOTICED. OR THE OPPOSITE, BEING SO FIGETY OR RESTLESS THAT YOU HAVE BEEN MOVING AROUND A LOT MORE THAN USUAL: NOT AT ALL
3. TROUBLE FALLING OR STAYING ASLEEP: NOT AT ALL

## 2024-09-23 ASSESSMENT — ENCOUNTER SYMPTOMS
SHORTNESS OF BREATH: 1
SORE THROAT: 0
ABDOMINAL DISTENTION: 1
CONSTIPATION: 1
VOMITING: 0
NAUSEA: 0
COUGH: 0
ABDOMINAL PAIN: 0

## 2024-11-05 ENCOUNTER — HOSPITAL ENCOUNTER (OUTPATIENT)
Facility: HOSPITAL | Age: 59
Discharge: HOME | End: 2024-11-08
Attending: INTERNAL MEDICINE
Payer: COMMERCIAL

## 2024-11-05 DIAGNOSIS — C49.A0 MALIGNANT GASTROINTESTINAL STROMAL TUMOR, UNSPECIFIED SITE (HCC): ICD-10-CM

## 2024-11-05 PROCEDURE — 2500000003 HC RX 250 WO HCPCS: Performed by: INTERNAL MEDICINE

## 2024-11-05 PROCEDURE — 82565 ASSAY OF CREATININE: CPT

## 2024-11-05 PROCEDURE — 74177 CT ABD & PELVIS W/CONTRAST: CPT

## 2024-11-05 PROCEDURE — 6360000004 HC RX CONTRAST MEDICATION: Performed by: INTERNAL MEDICINE

## 2024-11-05 RX ORDER — IOPAMIDOL 612 MG/ML
100 INJECTION, SOLUTION INTRAVASCULAR
Status: COMPLETED | OUTPATIENT
Start: 2024-11-05 | End: 2024-11-05

## 2024-11-05 RX ADMIN — BARIUM SULFATE 900 ML: 20 SUSPENSION ORAL at 08:30

## 2024-11-05 RX ADMIN — IOPAMIDOL 100 ML: 612 INJECTION, SOLUTION INTRAVENOUS at 10:12

## 2024-11-06 LAB — CREAT UR-MCNC: 0.8 MG/DL (ref 0.6–1.3)

## 2025-01-24 RX ORDER — METOPROLOL TARTRATE 25 MG/1
TABLET, FILM COATED ORAL
Qty: 180 TABLET | Refills: 3 | Status: SHIPPED | OUTPATIENT
Start: 2025-01-24

## 2025-03-31 ENCOUNTER — OFFICE VISIT (OUTPATIENT)
Age: 60
End: 2025-03-31
Payer: COMMERCIAL

## 2025-03-31 VITALS
HEIGHT: 71 IN | SYSTOLIC BLOOD PRESSURE: 128 MMHG | BODY MASS INDEX: 32.2 KG/M2 | DIASTOLIC BLOOD PRESSURE: 80 MMHG | OXYGEN SATURATION: 95 % | HEART RATE: 69 BPM | WEIGHT: 230 LBS

## 2025-03-31 DIAGNOSIS — I25.10 ATHEROSCLEROSIS OF NATIVE CORONARY ARTERY OF NATIVE HEART WITHOUT ANGINA PECTORIS: ICD-10-CM

## 2025-03-31 DIAGNOSIS — E78.5 HYPERLIPIDEMIA, UNSPECIFIED HYPERLIPIDEMIA TYPE: ICD-10-CM

## 2025-03-31 DIAGNOSIS — C49.A0 GASTROINTESTINAL STROMAL TUMOR, UNSPECIFIED SITE (HCC): ICD-10-CM

## 2025-03-31 DIAGNOSIS — I10 ESSENTIAL (PRIMARY) HYPERTENSION: ICD-10-CM

## 2025-03-31 DIAGNOSIS — I25.5 ISCHEMIC CARDIOMYOPATHY: Primary | ICD-10-CM

## 2025-03-31 DIAGNOSIS — Z95.1 PRESENCE OF AORTOCORONARY BYPASS GRAFT: ICD-10-CM

## 2025-03-31 DIAGNOSIS — Z72.0 TOBACCO USE: ICD-10-CM

## 2025-03-31 PROCEDURE — 3079F DIAST BP 80-89 MM HG: CPT | Performed by: INTERNAL MEDICINE

## 2025-03-31 PROCEDURE — 3074F SYST BP LT 130 MM HG: CPT | Performed by: INTERNAL MEDICINE

## 2025-03-31 PROCEDURE — 99214 OFFICE O/P EST MOD 30 MIN: CPT | Performed by: INTERNAL MEDICINE

## 2025-03-31 PROCEDURE — 93000 ELECTROCARDIOGRAM COMPLETE: CPT | Performed by: INTERNAL MEDICINE

## 2025-03-31 RX ORDER — NITROGLYCERIN 0.4 MG/1
0.4 TABLET SUBLINGUAL EVERY 5 MIN PRN
Qty: 25 TABLET | Refills: 3 | Status: SHIPPED | OUTPATIENT
Start: 2025-03-31

## 2025-03-31 ASSESSMENT — PATIENT HEALTH QUESTIONNAIRE - PHQ9
5. POOR APPETITE OR OVEREATING: NOT AT ALL
9. THOUGHTS THAT YOU WOULD BE BETTER OFF DEAD, OR OF HURTING YOURSELF: NOT AT ALL
1. LITTLE INTEREST OR PLEASURE IN DOING THINGS: NOT AT ALL
8. MOVING OR SPEAKING SO SLOWLY THAT OTHER PEOPLE COULD HAVE NOTICED. OR THE OPPOSITE, BEING SO FIGETY OR RESTLESS THAT YOU HAVE BEEN MOVING AROUND A LOT MORE THAN USUAL: NOT AT ALL
SUM OF ALL RESPONSES TO PHQ QUESTIONS 1-9: 0
SUM OF ALL RESPONSES TO PHQ QUESTIONS 1-9: 0
2. FEELING DOWN, DEPRESSED OR HOPELESS: NOT AT ALL
6. FEELING BAD ABOUT YOURSELF - OR THAT YOU ARE A FAILURE OR HAVE LET YOURSELF OR YOUR FAMILY DOWN: NOT AT ALL
3. TROUBLE FALLING OR STAYING ASLEEP: NOT AT ALL
4. FEELING TIRED OR HAVING LITTLE ENERGY: NOT AT ALL
SUM OF ALL RESPONSES TO PHQ QUESTIONS 1-9: 0
SUM OF ALL RESPONSES TO PHQ QUESTIONS 1-9: 0
7. TROUBLE CONCENTRATING ON THINGS, SUCH AS READING THE NEWSPAPER OR WATCHING TELEVISION: NOT AT ALL
10. IF YOU CHECKED OFF ANY PROBLEMS, HOW DIFFICULT HAVE THESE PROBLEMS MADE IT FOR YOU TO DO YOUR WORK, TAKE CARE OF THINGS AT HOME, OR GET ALONG WITH OTHER PEOPLE: NOT DIFFICULT AT ALL

## 2025-03-31 ASSESSMENT — ENCOUNTER SYMPTOMS
ABDOMINAL PAIN: 0
NAUSEA: 0
COUGH: 0
SHORTNESS OF BREATH: 1
ABDOMINAL DISTENTION: 1
SORE THROAT: 0
CONSTIPATION: 0
VOMITING: 0

## 2025-03-31 ASSESSMENT — ANXIETY QUESTIONNAIRES
GAD7 TOTAL SCORE: 0
4. TROUBLE RELAXING: NOT AT ALL
2. NOT BEING ABLE TO STOP OR CONTROL WORRYING: NOT AT ALL
5. BEING SO RESTLESS THAT IT IS HARD TO SIT STILL: NOT AT ALL
1. FEELING NERVOUS, ANXIOUS, OR ON EDGE: NOT AT ALL
6. BECOMING EASILY ANNOYED OR IRRITABLE: NOT AT ALL
3. WORRYING TOO MUCH ABOUT DIFFERENT THINGS: NOT AT ALL
7. FEELING AFRAID AS IF SOMETHING AWFUL MIGHT HAPPEN: NOT AT ALL

## 2025-03-31 NOTE — PATIENT INSTRUCTIONS
Medication Starting AS NEEDED : Nitroglycerin (Nitrostat) 0.4 mg - Place 1 tablet under the tongue every 5 minutes as needed for chest pain. Up to max 3 total doses, If no relief after 1 dose call 911   Yes

## 2025-03-31 NOTE — PROGRESS NOTES
Cal Julien presents today for   Chief Complaint   Patient presents with    Follow-up     6 month       Cal Julien preferred language for health care discussion is english/other.    Is someone accompanying this pt? no    Is the patient using any DME equipment during OV? no    Depression Screening:  Depression: Not at risk (3/31/2025)    PHQ-2     PHQ-2 Score: 0        Learning Assessment:  Who is the primary learner? Patient    What is the preferred language for health care of the primary learner? ENGLISH    How does the primary learner prefer to learn new concepts? DEMONSTRATION    Answered By patient    Relationship to Learner SELF           Pt currently taking Anticoagulant therapy? no    Pt currently taking Antiplatelet therapy ? Aspirin 325 mg daily      Coordination of Care:  1. Have you been to the ER, urgent care clinic since your last visit? Hospitalized since your last visit? no    2. Have you seen or consulted any other health care providers outside of the [facility] since your last visit? Include any pap smears or colon screening. no    
  Genitourinary:  Negative for dysuria.   Musculoskeletal:  Negative for arthralgias.   Skin:  Negative for rash.   Neurological:  Negative for dizziness, syncope, weakness and headaches.   Hematological:  Does not bruise/bleed easily.   Psychiatric/Behavioral:  Negative for suicidal ideas.          /80 (BP Site: Left Upper Arm, Patient Position: Sitting, BP Cuff Size: Large Adult)   Pulse 69   Ht 1.803 m (5' 11\")   Wt 104.3 kg (230 lb)   SpO2 95%   BMI 32.08 kg/m²     Objective:   Physical Exam  Constitutional:       General: He is not in acute distress.  HENT:      Head: Normocephalic.   Neck:      Vascular: No carotid bruit or JVD.   Cardiovascular:      Rate and Rhythm: Normal rate and regular rhythm. No extrasystoles are present.     Heart sounds: No murmur heard.     No gallop.   Pulmonary:      Effort: Pulmonary effort is normal.      Breath sounds: Decreased air movement present. No wheezing, rhonchi or rales.   Abdominal:      General: Bowel sounds are normal. There is no distension.      Palpations: Abdomen is soft.      Tenderness: There is no abdominal tenderness.   Musculoskeletal:         General: No swelling or deformity.   Skin:     General: Skin is warm and dry.      Findings: No rash.   Neurological:      General: No focal deficit present.      Mental Status: He is alert and oriented to person, place, and time.   Psychiatric:         Mood and Affect: Mood normal.         Behavior: Behavior normal.         EKG: Normal sinus rhythm, normal axis, old inferior Q waves,  no ST or T wave changes concerning for ischemia.  Compared to the previous EKG, no significant change.    Assessment / Plan:   Coronary artery disease.  Status post three-vessel CABG in 2009.  LIMA to LAD, SVG to diagonal SVG to RPDA.  The SVG to PDA is known to be occluded on repeat cardiac catheterization in 2011.  His native RCA has a 70% distal stenosis which has been managed medically.  Patient last underwent a repeat

## 2025-04-15 RX ORDER — SACUBITRIL AND VALSARTAN 49; 51 MG/1; MG/1
1 TABLET, FILM COATED ORAL 2 TIMES DAILY
Qty: 60 TABLET | Refills: 6 | Status: SHIPPED | OUTPATIENT
Start: 2025-04-15

## 2025-04-22 ENCOUNTER — HOSPITAL ENCOUNTER (OUTPATIENT)
Facility: HOSPITAL | Age: 60
Discharge: HOME | End: 2025-04-25
Attending: INTERNAL MEDICINE
Payer: COMMERCIAL

## 2025-04-22 DIAGNOSIS — C49.A0 MALIGNANT GASTROINTESTINAL STROMAL TUMOR, UNSPECIFIED SITE (HCC): ICD-10-CM

## 2025-04-22 LAB — CREAT UR-MCNC: 0.9 MG/DL (ref 0.6–1.3)

## 2025-04-22 PROCEDURE — 6360000004 HC RX CONTRAST MEDICATION: Performed by: INTERNAL MEDICINE

## 2025-04-22 PROCEDURE — 74177 CT ABD & PELVIS W/CONTRAST: CPT

## 2025-04-22 PROCEDURE — 82565 ASSAY OF CREATININE: CPT

## 2025-04-22 PROCEDURE — 2500000003 HC RX 250 WO HCPCS: Performed by: INTERNAL MEDICINE

## 2025-04-22 RX ORDER — IOPAMIDOL 612 MG/ML
100 INJECTION, SOLUTION INTRAVASCULAR
Status: COMPLETED | OUTPATIENT
Start: 2025-04-22 | End: 2025-04-22

## 2025-04-22 RX ADMIN — BARIUM SULFATE 900 ML: 20 SUSPENSION ORAL at 08:25

## 2025-04-22 RX ADMIN — IOPAMIDOL 100 ML: 612 INJECTION, SOLUTION INTRAVENOUS at 10:03

## 2025-05-20 ENCOUNTER — TRANSCRIBE ORDERS (OUTPATIENT)
Facility: HOSPITAL | Age: 60
End: 2025-05-20

## 2025-05-20 DIAGNOSIS — C49.A0 MALIGNANT GASTROINTESTINAL STROMAL TUMOR, UNSPECIFIED SITE (HCC): Primary | ICD-10-CM

## 2025-07-08 RX ORDER — HYDROCHLOROTHIAZIDE 25 MG/1
25 TABLET ORAL DAILY
Qty: 90 TABLET | Refills: 3 | Status: SHIPPED | OUTPATIENT
Start: 2025-07-08